# Patient Record
Sex: FEMALE | Race: WHITE | NOT HISPANIC OR LATINO | Employment: UNEMPLOYED | URBAN - METROPOLITAN AREA
[De-identification: names, ages, dates, MRNs, and addresses within clinical notes are randomized per-mention and may not be internally consistent; named-entity substitution may affect disease eponyms.]

---

## 2017-01-23 ENCOUNTER — HOSPITAL ENCOUNTER (OUTPATIENT)
Dept: RADIOLOGY | Facility: MEDICAL CENTER | Age: 52
End: 2017-01-23
Attending: FAMILY MEDICINE
Payer: COMMERCIAL

## 2017-01-23 ENCOUNTER — OFFICE VISIT (OUTPATIENT)
Dept: URGENT CARE | Facility: PHYSICIAN GROUP | Age: 52
End: 2017-01-23
Payer: COMMERCIAL

## 2017-01-23 VITALS
HEIGHT: 68 IN | RESPIRATION RATE: 16 BRPM | BODY MASS INDEX: 25.01 KG/M2 | TEMPERATURE: 97.8 F | OXYGEN SATURATION: 98 % | DIASTOLIC BLOOD PRESSURE: 78 MMHG | WEIGHT: 165 LBS | SYSTOLIC BLOOD PRESSURE: 138 MMHG | HEART RATE: 88 BPM
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
## 2017-01-23 PROCEDURE — 99202 OFFICE O/P NEW SF 15 MIN: CPT | Mod: 25 | Performed by: FAMILY MEDICINE

## 2017-01-23 PROCEDURE — 73140 X-RAY EXAM OF FINGER(S): CPT | Mod: LT

## 2017-01-23 PROCEDURE — 90471 IMMUNIZATION ADMIN: CPT | Performed by: FAMILY MEDICINE

## 2017-01-23 PROCEDURE — 90715 TDAP VACCINE 7 YRS/> IM: CPT | Performed by: FAMILY MEDICINE

## 2017-01-23 RX ORDER — IBUPROFEN 800 MG/1
800 TABLET ORAL EVERY 8 HOURS PRN
Qty: 20 TAB | Refills: 3 | Status: SHIPPED | OUTPATIENT
Start: 2017-01-23 | End: 2017-06-21

## 2017-01-23 RX ORDER — AMOXICILLIN AND CLAVULANATE POTASSIUM 875; 125 MG/1; MG/1
1 TABLET, FILM COATED ORAL 2 TIMES DAILY
Qty: 14 TAB | Refills: 0 | Status: SHIPPED | OUTPATIENT
Start: 2017-01-23 | End: 2017-01-30

## 2017-01-23 ASSESSMENT — ENCOUNTER SYMPTOMS
CHILLS: 0
FEVER: 0
SENSORY CHANGE: 1
FOCAL WEAKNESS: 0

## 2017-01-23 NOTE — MR AVS SNAPSHOT
"        Zari Day   2017 11:45 AM   Office Visit   MRN: [id number]    Department:  [facility]   Dept Phone:  [phone]    Description:  Female : 1965   Provider:  Kory Medina M.D.           Reason for Visit     Dog Bite right hand index and middle finger bites, stray dog unknown on shots       Allergies as of 2017     No Known Allergies      You were diagnosed with     Skin laceration   [085235]       Dog bite, initial encounter   [940001]         Vital Signs     Blood Pressure Pulse Temperature Respirations Height Weight    138/78 mmHg 88 36.6 °C (97.8 °F) 16 1.727 m (5' 8\") 74.844 kg (165 lb)    Body Mass Index Oxygen Saturation Smoking Status             25.09 kg/m2 98% Unknown If Ever Smoked         Basic Information     Date Of Birth Sex Race Ethnicity Preferred Language    1965 Female White Non- English      Your appointments     Mar 09, 2017 11:00 AM   New Patient with Dai Chairez M.D.   [facility] [address], [address]  [facility] [address]   [phone]              Health Maintenance        Date Due Completion Dates    IMM DTaP/Tdap/Td Vaccine (1 - Tdap) 1986 ---    PAP SMEAR 1988 ---    MAMMOGRAM 2007 ---    IMM INFLUENZA (1) 2016 ---            Current Immunizations     Tdap Vaccine 2017      Below and/or attached are the medications your provider expects you to take. Review all of your home medications and newly ordered medications with your provider and/or pharmacist. Follow medication instructions as directed by your provider and/or pharmacist. Please keep your medication list with you and share with your provider. Update the information when medications are discontinued, doses are changed, or new medications (including over-the-counter products) are added; and carry medication information at all times in the event of emergency situations     Allergies:  No Known Allergies       "   Medications  Valid as of: January 23, 2017 -  1:10 PM    Generic Name Brand Name Tablet Size Instructions for use    Amoxicillin-Pot Clavulanate (Tab) AUGMENTIN 875-125 MG Take 1 Tab by mouth 2 times a day for 7 days.        Ibuprofen (Tab) MOTRIN 800 MG Take 1 Tab by mouth every 8 hours as needed.        .                 Medicines prescribed today were sent to:     [id number] - [place], [place] - [address] [place] [address]    Phone: [phone] Fax: [phone]    Open 24 Hours?: No      Medication refill instructions:       If your prescription bottle indicates you have medication refills left, it is not necessary to call your provider’s office. Please contact your pharmacy and they will refill your medication.    If your prescription bottle indicates you do not have any refills left, you may request refills at any time through one of the following ways: The online [employer] system (except Urgent Care), by calling your provider’s office, or by asking your pharmacy to contact your provider’s office with a refill request. Medication refills are processed only during regular business hours and may not be available until the next business day. Your provider may request additional information or to have a follow-up visit with you prior to refilling your medication.   *Please Note: Medication refills are assigned a new Rx number when refilled electronically. Your pharmacy may indicate that no refills were authorized even though a new prescription for the same medication is available at the pharmacy. Please request the medicine by name with the pharmacy before contacting your provider for a refill.        Your To Do List     Future Labs/Procedures Complete By Expires    DX-FINGER(S) 2+ LEFT  As directed 1/23/2018         [employer] Access Code: [email or web address]  Expires: 2/10/2017  3:49 PM    [employer]  A secure, online tool to manage your health information     [email or web address]’s  FreeBorders® is a secure, online tool that connects you to your personalized health information from the privacy of your home -- day or night - making it very easy for you to manage your healthcare. Once the activation process is completed, you can even access your medical information using the FreeBorders norris, which is available for free in the Apple Norris store or Google Play store.     FreeBorders provides the following levels of access (as shown below):   My Chart Features   [facility] Doctor [facility]  Specialists [facility]  Urgent  Care Non-[facility]  Primary Care  Doctor   Email your healthcare team securely and privately 24/7 X X X    Manage appointments: schedule your next appointment; view details of past/upcoming appointments X      Request prescription refills. X      View recent personal medical records, including lab and immunizations X X X X   View health record, including health history, allergies, medications X X X X   Read reports about your outpatient visits, procedures, consult and ER notes X X X X   See your discharge summary, which is a recap of your hospital and/or ER visit that includes your diagnosis, lab results, and care plan. X X       How to register for FreeBorders:  1. Go to  [email or web address].  2. Click on the Sign Up Now box, which takes you to the New Member Sign Up page. You will need to provide the following information:  a. Enter your FreeBorders Access Code exactly as it appears at the top of this page. (You will not need to use this code after you’ve completed the sign-up process. If you do not sign up before the expiration date, you must request a new code.)   b. Enter your date of birth.   c. Enter your home email address.   d. Click Submit, and follow the next screen’s instructions.  3. Create a FreeBorders ID. This will be your FreeBorders login ID and cannot be changed, so think of one that is secure and easy to remember.  4. Create a FreeBorders password. You can change your password at any  time.  5. Enter your Password Reset Question and Answer. This can be used at a later time if you forget your password.   6. Enter your e-mail address. This allows you to receive e-mail notifications when new information is available in Mobile [employer]br[employer].  7. Click Sign Up. You can now view your health information.    For assistance activating your [employer] account, call [phone]

## 2017-01-23 NOTE — PROGRESS NOTES
"Subjective:      Zari Day is a 51 y.o. female who presents with Dog Bite    Chief Complaint   Patient presents with   • Dog Bite     right hand index and middle finger bites, stray dog unknown on shots        - saw what looked to be a lost dog on side road 2 days ago. She went to rescue dog and as she was trying to pull/lift the dog by collar into her car it snapped at her biting Lt index/middle fingers. The dog then ran off.     Last td about 10yrs ago                HPI    Review of Systems   Constitutional: Negative for fever and chills.   Neurological: Positive for sensory change. Negative for focal weakness.          Objective:     /78 mmHg  Pulse 88  Temp(Src) 36.6 °C (97.8 °F)  Resp 16  Ht 1.727 m (5' 8\")  Wt 74.844 kg (165 lb)  BMI 25.09 kg/m2  SpO2 98%     Physical Exam   Constitutional: She appears well-developed. No distress.   HENT:   Head: Normocephalic and atraumatic.   Cardiovascular: Regular rhythm.    No murmur heard.  Neurological: She is alert.   Skin: Skin is warm and dry.   Psychiatric: She has a normal mood and affect. Judgment normal.   Nursing note and vitals reviewed.  Lt hand: some superficial PW's over distal index and middle fingers. Trace edema w/ some difficulty w/ full rom due to swelling pain but otherwise NVI. No signs infection              Assessment/Plan:         1. Skin laceration  DX-FINGER(S) 2+ LEFT    Tdap =>6yo IM   2. Dog bite, initial encounter  amoxicillin-clavulanate (AUGMENTIN) 875-125 MG Tab    ibuprofen (MOTRIN) 800 MG Tab    DX-FINGER(S) 2+ LEFT    Tdap =>8yo IM             Dx & d/c instructions discussed w/ patient and/or family members. Follow up w/ Prvt Dr or here in 3-4 days if not getting better, sooner if needed,  ER if worse and UC/PCP unavailable.        Possible side effects (i.e. Rash, GI upset/constipation, sedation, elevation of BP or sugars) of any medications given discussed.                    "

## 2017-05-10 ENCOUNTER — GYNECOLOGY VISIT (OUTPATIENT)
Dept: OBGYN | Facility: CLINIC | Age: 52
End: 2017-05-10
Payer: COMMERCIAL

## 2017-05-10 ENCOUNTER — HOSPITAL ENCOUNTER (OUTPATIENT)
Facility: MEDICAL CENTER | Age: 52
End: 2017-05-10
Attending: OBSTETRICS & GYNECOLOGY
Payer: COMMERCIAL

## 2017-05-10 VITALS
BODY MASS INDEX: 25.31 KG/M2 | SYSTOLIC BLOOD PRESSURE: 102 MMHG | HEIGHT: 68 IN | DIASTOLIC BLOOD PRESSURE: 62 MMHG | WEIGHT: 167 LBS

## 2017-05-10 DIAGNOSIS — Z01.419 WELL WOMAN EXAM: ICD-10-CM

## 2017-05-10 DIAGNOSIS — Z12.31 ENCOUNTER FOR SCREENING MAMMOGRAM FOR BREAST CANCER: ICD-10-CM

## 2017-05-10 DIAGNOSIS — Z11.51 SCREENING FOR HPV (HUMAN PAPILLOMAVIRUS): ICD-10-CM

## 2017-05-10 DIAGNOSIS — Z12.4 ROUTINE CERVICAL SMEAR: ICD-10-CM

## 2017-05-10 PROCEDURE — 99386 PREV VISIT NEW AGE 40-64: CPT | Performed by: OBSTETRICS & GYNECOLOGY

## 2017-05-10 PROCEDURE — 88175 CYTOPATH C/V AUTO FLUID REDO: CPT

## 2017-05-10 PROCEDURE — 87624 HPV HI-RISK TYP POOLED RSLT: CPT

## 2017-05-10 NOTE — MR AVS SNAPSHOT
"        Zari Day   5/10/2017 1:30 PM   Gynecology Visit   MRN: [id number]    Department:  [facility]   Dept Phone:  [phone]    Description:  Female : 1965   Provider:  Dai Chairez M.D.           Reason for Visit     Gynecologic Exam           Allergies as of 5/10/2017     No Known Allergies      You were diagnosed with     Well woman exam   [037167]       Routine cervical smear   [367172]       Screening for HPV (human papillomavirus)   [370411]       Encounter for screening mammogram for breast cancer   [0383505]         Vital Signs     Blood Pressure Height Weight Body Mass Index Last Menstrual Period Smoking Status    102/62 mmHg 1.727 m (5' 8\") 75.751 kg (167 lb) 25.40 kg/m2 2017 Unknown If Ever Smoked      Basic Information     Date Of Birth Sex Race Ethnicity Preferred Language    1965 Female White Non- English      Health Maintenance        Date Due Completion Dates    PAP SMEAR 1988 ---    MAMMOGRAM 2007 ---    IMM DTaP/Tdap/Td Vaccine (2 - Td) 2027            Current Immunizations     Tdap Vaccine 2017      Below and/or attached are the medications your provider expects you to take. Review all of your home medications and newly ordered medications with your provider and/or pharmacist. Follow medication instructions as directed by your provider and/or pharmacist. Please keep your medication list with you and share with your provider. Update the information when medications are discontinued, doses are changed, or new medications (including over-the-counter products) are added; and carry medication information at all times in the event of emergency situations     Allergies:  No Known Allergies          Medications  Valid as of: May 10, 2017 -  2:49 PM    Generic Name Brand Name Tablet Size Instructions for use    Ibuprofen (Tab) MOTRIN 800 MG Take 1 Tab by mouth every 8 hours as needed.        .                 Medicines " prescribed today were sent to:     [id number] - [place], [place] - [id number] [facility] [place] [address]    Phone: [phone] Fax: [phone]    Open 24 Hours?: No      Medication refill instructions:       If your prescription bottle indicates you have medication refills left, it is not necessary to call your provider’s office. Please contact your pharmacy and they will refill your medication.    If your prescription bottle indicates you do not have any refills left, you may request refills at any time through one of the following ways: The online [employer] system (except Urgent Care), by calling your provider’s office, or by asking your pharmacy to contact your provider’s office with a refill request. Medication refills are processed only during regular business hours and may not be available until the next business day. Your provider may request additional information or to have a follow-up visit with you prior to refilling your medication.   *Please Note: Medication refills are assigned a new Rx number when refilled electronically. Your pharmacy may indicate that no refills were authorized even though a new prescription for the same medication is available at the pharmacy. Please request the medicine by name with the pharmacy before contacting your provider for a refill.        Your To Do List     Future Labs/Procedures Complete By Expires    MA-SCREEN MAMMO W/CAD-BILAT  As directed 5/10/2018    THINPREP PAP WITH HPV  As directed 5/10/2018         [employer] Access Code: [email or web address]  Expires: 6/8/2017 12:46 PM    [employer]  A secure, online tool to manage your health information     [employer]’s WizeHive® is a secure, online tool that connects you to your personalized health information from the privacy of your home -- day or night - making it very easy for you to manage your healthcare. Once the activation process is completed, you can even access your medical information  using the [employer] norris, which is available for free in the Apple Norris store or Google Play store.     [employer] provides the following levels of access (as shown below):   My Chart Features   [facility] Doctor [facility]  Specialists [facility]  Urgent  Care Non-[facility]  Primary Care  Doctor   Email your healthcare team securely and privately 24/7 X X X    Manage appointments: schedule your next appointment; view details of past/upcoming appointments X      Request prescription refills. X      View recent personal medical records, including lab and immunizations X X X X   View health record, including health history, allergies, medications X X X X   Read reports about your outpatient visits, procedures, consult and ER notes X X X X   See your discharge summary, which is a recap of your hospital and/or ER visit that includes your diagnosis, lab results, and care plan. X X       How to register for [employer]:  1. Go to  [email or web address].  2. Click on the Sign Up Now box, which takes you to the New Member Sign Up page. You will need to provide the following information:  a. Enter your [employer] Access Code exactly as it appears at the top of this page. (You will not need to use this code after you’ve completed the sign-up process. If you do not sign up before the expiration date, you must request a new code.)   b. Enter your date of birth.   c. Enter your home email address.   d. Click Submit, and follow the next screen’s instructions.  3. Create a [employer] ID. This will be your [employer] login ID and cannot be changed, so think of one that is secure and easy to remember.  4. Create a [employer] password. You can change your password at any time.  5. Enter your Password Reset Question and Answer. This can be used at a later time if you forget your password.   6. Enter your e-mail address. This allows you to receive e-mail notifications when new information is available in [employer].  7. Click Sign Up. You can now view your  health information.    For assistance activating your [employer] account, call [phone]

## 2017-05-10 NOTE — PROGRESS NOTES
" Zari Day51 y.o.  female presents for Annual Well Woman Exam.   Patient is currently without complaints. She reports irregular menstrual cycles . She reports that sometimes she skips them for a few months and then has a 3-4 day cycle which is very very light. She denies any excessive pain, no breakthrough bleeding. She does complain of hot flashes mood swings and occasional night sweats.      ROS: Patient is feeling well. No dyspnea or chest pain on exertion. No Abdominal pain, change in bowel habits, black or bloody stools. No urinary sx. GYN ROS:no breast pain or new or enlarging lumps on self exam. Denies breast tenderness, mass, discharge, changes in size or contour, or abnormal cyclic discomfort. No neurological complaints.  History reviewed. No pertinent past medical history.  None  Allergy:   Review of patient's allergies indicates no known allergies.    LMP:       Patient's last menstrual period was 2017. .     Menstrual History: menses irregular: Every few months very light not painfulnone  Contraceptive Method:none    Pap history, the patient denies abnormal Pap smears and denies   sexually transmitted diseases    Mammo:needs    Objective : The patient appears well, alert and oriented x 3, in no acute distress.  Blood pressure 102/62, height 1.727 m (5' 8\"), weight 75.751 kg (167 lb), last menstrual period 2017.  HEENT Exam: EOMI, DEANGELO, no adenopathy or thyromegaly.  Lungs: Clear to Auscultation   Cor: S1 and S2 normal, no murmurs, or rubs   Abdomen: Soft without tenderness, guarding mass or organomegaly.  Extremities: No edema, pulses equal  Neurological: Normal No focal signs  Breast Exam:Inspection negative. No nipple discharge or bleeding. No masses or nodularity palpable  Pelvic: External genitalia,urethral meatus, urethra, bladder and vagina normal. Cervix, uterus and adnexa intact and normal.  Anus and perineum normal. Bimanual and rectovaginal without masses or " tenderness., negative findings: external genitalia normal, Bartholin's glands, urethra, [place]'s glands negative, vaginal mucosa normal, cervix clear, normal sized uterus, adnexae negative    Lab:No results found for this or any previous visit (from the past 336 hour(s)).    Assessment:  well woman    Plan:mammogram  pap smear  return annually or prn  Self Breast Exams    Discussed medications for symptomatically relief of hot flashes at this point patient does not desire treatment

## 2017-05-11 LAB
CYTOLOGY REG CYTOL: NORMAL
HPV HR 12 DNA CVX QL NAA+PROBE: NEGATIVE
HPV16 DNA SPEC QL NAA+PROBE: NEGATIVE
HPV18 DNA SPEC QL NAA+PROBE: NEGATIVE
SPECIMEN SOURCE: NORMAL

## 2017-06-21 ENCOUNTER — RESOLUTE PROFESSIONAL BILLING HOSPITAL PROF FEE (OUTPATIENT)
Dept: HOSPITALIST | Facility: MEDICAL CENTER | Age: 52
End: 2017-06-21
Payer: COMMERCIAL

## 2017-06-21 ENCOUNTER — APPOINTMENT (OUTPATIENT)
Dept: RADIOLOGY | Facility: MEDICAL CENTER | Age: 52
DRG: 897 | End: 2017-06-21
Attending: HOSPITALIST
Payer: COMMERCIAL

## 2017-06-21 ENCOUNTER — HOSPITAL ENCOUNTER (INPATIENT)
Facility: MEDICAL CENTER | Age: 52
LOS: 1 days | DRG: 897 | End: 2017-06-22
Attending: EMERGENCY MEDICINE | Admitting: HOSPITALIST
Payer: COMMERCIAL

## 2017-06-21 DIAGNOSIS — D69.6 THROMBOCYTOPENIA (HCC): ICD-10-CM

## 2017-06-21 DIAGNOSIS — F10.939 ALCOHOL WITHDRAWAL, WITH UNSPECIFIED COMPLICATION (HCC): ICD-10-CM

## 2017-06-21 DIAGNOSIS — K70.10 ALCOHOLIC HEPATITIS WITHOUT ASCITES: ICD-10-CM

## 2017-06-21 DIAGNOSIS — R56.9: ICD-10-CM

## 2017-06-21 DIAGNOSIS — D72.819 LEUKOPENIA, UNSPECIFIED TYPE: ICD-10-CM

## 2017-06-21 DIAGNOSIS — F10.939: ICD-10-CM

## 2017-06-21 LAB
ALBUMIN SERPL BCP-MCNC: 4.8 G/DL (ref 3.2–4.9)
ALBUMIN/GLOB SERPL: 1.9 G/DL
ALP SERPL-CCNC: 157 U/L (ref 30–99)
ALT SERPL-CCNC: 64 U/L (ref 2–50)
ANION GAP SERPL CALC-SCNC: 13 MMOL/L (ref 0–11.9)
AST SERPL-CCNC: 128 U/L (ref 12–45)
BASOPHILS # BLD AUTO: 1.6 % (ref 0–1.8)
BASOPHILS # BLD: 0.03 K/UL (ref 0–0.12)
BILIRUB SERPL-MCNC: 4.1 MG/DL (ref 0.1–1.5)
BUN SERPL-MCNC: 12 MG/DL (ref 8–22)
CALCIUM SERPL-MCNC: 10.3 MG/DL (ref 8.5–10.5)
CHLORIDE SERPL-SCNC: 102 MMOL/L (ref 96–112)
CO2 SERPL-SCNC: 23 MMOL/L (ref 20–33)
COMMENT 1642: NORMAL
CREAT SERPL-MCNC: 0.69 MG/DL (ref 0.5–1.4)
EOSINOPHIL # BLD AUTO: 0.01 K/UL (ref 0–0.51)
EOSINOPHIL NFR BLD: 0.5 % (ref 0–6.9)
ERYTHROCYTE [DISTWIDTH] IN BLOOD BY AUTOMATED COUNT: 50.2 FL (ref 35.9–50)
GFR SERPL CREATININE-BSD FRML MDRD: >60 ML/MIN/1.73 M 2
GLOBULIN SER CALC-MCNC: 2.5 G/DL (ref 1.9–3.5)
GLUCOSE SERPL-MCNC: 165 MG/DL (ref 65–99)
HCT VFR BLD AUTO: 37.1 % (ref 37–47)
HGB BLD-MCNC: 13.1 G/DL (ref 12–16)
IMM GRANULOCYTES # BLD AUTO: 0 K/UL (ref 0–0.11)
IMM GRANULOCYTES NFR BLD AUTO: 0 % (ref 0–0.9)
LYMPHOCYTES # BLD AUTO: 0.29 K/UL (ref 1–4.8)
LYMPHOCYTES NFR BLD: 15.3 % (ref 22–41)
MCH RBC QN AUTO: 34.7 PG (ref 27–33)
MCHC RBC AUTO-ENTMCNC: 35.3 G/DL (ref 33.6–35)
MCV RBC AUTO: 98.4 FL (ref 81.4–97.8)
MONOCYTES # BLD AUTO: 0.3 K/UL (ref 0–0.85)
MONOCYTES NFR BLD AUTO: 15.8 % (ref 0–13.4)
MORPHOLOGY BLD-IMP: NORMAL
NEUTROPHILS # BLD AUTO: 1.27 K/UL (ref 2–7.15)
NEUTROPHILS NFR BLD: 66.8 % (ref 44–72)
NRBC # BLD AUTO: 0 K/UL
NRBC BLD AUTO-RTO: 0 /100 WBC
PLATELET # BLD AUTO: 46 K/UL (ref 164–446)
PLATELETS.RETICULATED NFR BLD AUTO: 8 K/UL (ref 0.6–13.1)
PMV BLD AUTO: 9.8 FL (ref 9–12.9)
POTASSIUM SERPL-SCNC: 3.4 MMOL/L (ref 3.6–5.5)
PROT SERPL-MCNC: 7.3 G/DL (ref 6–8.2)
RBC # BLD AUTO: 3.77 M/UL (ref 4.2–5.4)
SODIUM SERPL-SCNC: 138 MMOL/L (ref 135–145)
WBC # BLD AUTO: 1.9 K/UL (ref 4.8–10.8)

## 2017-06-21 PROCEDURE — 700101 HCHG RX REV CODE 250: Performed by: EMERGENCY MEDICINE

## 2017-06-21 PROCEDURE — 85055 RETICULATED PLATELET ASSAY: CPT

## 2017-06-21 PROCEDURE — 700105 HCHG RX REV CODE 258: Performed by: HOSPITALIST

## 2017-06-21 PROCEDURE — 96375 TX/PRO/DX INJ NEW DRUG ADDON: CPT

## 2017-06-21 PROCEDURE — 96365 THER/PROPH/DIAG IV INF INIT: CPT

## 2017-06-21 PROCEDURE — 770020 HCHG ROOM/CARE - TELE (206)

## 2017-06-21 PROCEDURE — 99285 EMERGENCY DEPT VISIT HI MDM: CPT

## 2017-06-21 PROCEDURE — 99223 1ST HOSP IP/OBS HIGH 75: CPT | Performed by: HOSPITALIST

## 2017-06-21 PROCEDURE — 700102 HCHG RX REV CODE 250 W/ 637 OVERRIDE(OP): Performed by: HOSPITALIST

## 2017-06-21 PROCEDURE — 700111 HCHG RX REV CODE 636 W/ 250 OVERRIDE (IP): Performed by: EMERGENCY MEDICINE

## 2017-06-21 PROCEDURE — 85025 COMPLETE CBC W/AUTO DIFF WBC: CPT

## 2017-06-21 PROCEDURE — A9270 NON-COVERED ITEM OR SERVICE: HCPCS | Performed by: HOSPITALIST

## 2017-06-21 PROCEDURE — 95951 EEG: CPT

## 2017-06-21 PROCEDURE — HZ2ZZZZ DETOXIFICATION SERVICES FOR SUBSTANCE ABUSE TREATMENT: ICD-10-PCS | Performed by: HOSPITALIST

## 2017-06-21 PROCEDURE — 80053 COMPREHEN METABOLIC PANEL: CPT

## 2017-06-21 PROCEDURE — 36415 COLL VENOUS BLD VENIPUNCTURE: CPT

## 2017-06-21 PROCEDURE — 700111 HCHG RX REV CODE 636 W/ 250 OVERRIDE (IP): Performed by: HOSPITALIST

## 2017-06-21 RX ORDER — HEPARIN SODIUM 5000 [USP'U]/ML
5000 INJECTION, SOLUTION INTRAVENOUS; SUBCUTANEOUS EVERY 8 HOURS
Status: DISCONTINUED | OUTPATIENT
Start: 2017-06-21 | End: 2017-06-22 | Stop reason: HOSPADM

## 2017-06-21 RX ORDER — POTASSIUM CHLORIDE 20 MEQ/1
40 TABLET, EXTENDED RELEASE ORAL 2 TIMES DAILY
Status: COMPLETED | OUTPATIENT
Start: 2017-06-21 | End: 2017-06-21

## 2017-06-21 RX ORDER — SODIUM CHLORIDE 9 MG/ML
INJECTION, SOLUTION INTRAVENOUS CONTINUOUS
Status: DISCONTINUED | OUTPATIENT
Start: 2017-06-21 | End: 2017-06-22 | Stop reason: HOSPADM

## 2017-06-21 RX ORDER — PROMETHAZINE HYDROCHLORIDE 25 MG/1
12.5-25 SUPPOSITORY RECTAL EVERY 4 HOURS PRN
Status: DISCONTINUED | OUTPATIENT
Start: 2017-06-21 | End: 2017-06-22 | Stop reason: HOSPADM

## 2017-06-21 RX ORDER — POLYETHYLENE GLYCOL 3350 17 G/17G
1 POWDER, FOR SOLUTION ORAL
Status: DISCONTINUED | OUTPATIENT
Start: 2017-06-21 | End: 2017-06-22 | Stop reason: HOSPADM

## 2017-06-21 RX ORDER — FOLIC ACID 1 MG/1
1 TABLET ORAL DAILY
Status: DISCONTINUED | OUTPATIENT
Start: 2017-06-21 | End: 2017-06-22 | Stop reason: HOSPADM

## 2017-06-21 RX ORDER — LORAZEPAM 2 MG/ML
2 INJECTION INTRAMUSCULAR ONCE
Status: COMPLETED | OUTPATIENT
Start: 2017-06-21 | End: 2017-06-21

## 2017-06-21 RX ORDER — LORAZEPAM 2 MG/ML
2 INJECTION INTRAMUSCULAR
Status: COMPLETED | OUTPATIENT
Start: 2017-06-21 | End: 2017-06-22

## 2017-06-21 RX ORDER — PROMETHAZINE HYDROCHLORIDE 25 MG/1
12.5-25 TABLET ORAL EVERY 4 HOURS PRN
Status: DISCONTINUED | OUTPATIENT
Start: 2017-06-21 | End: 2017-06-22 | Stop reason: HOSPADM

## 2017-06-21 RX ORDER — CHLORDIAZEPOXIDE HYDROCHLORIDE 5 MG/1
10 CAPSULE, GELATIN COATED ORAL EVERY 8 HOURS
Status: DISCONTINUED | OUTPATIENT
Start: 2017-06-21 | End: 2017-06-22 | Stop reason: HOSPADM

## 2017-06-21 RX ORDER — LORAZEPAM 1 MG/1
4 TABLET ORAL
Status: DISCONTINUED | OUTPATIENT
Start: 2017-06-21 | End: 2017-06-22 | Stop reason: HOSPADM

## 2017-06-21 RX ORDER — ONDANSETRON 2 MG/ML
4 INJECTION INTRAMUSCULAR; INTRAVENOUS EVERY 4 HOURS PRN
Status: DISCONTINUED | OUTPATIENT
Start: 2017-06-21 | End: 2017-06-22 | Stop reason: HOSPADM

## 2017-06-21 RX ORDER — BISACODYL 10 MG
10 SUPPOSITORY, RECTAL RECTAL
Status: DISCONTINUED | OUTPATIENT
Start: 2017-06-21 | End: 2017-06-22 | Stop reason: HOSPADM

## 2017-06-21 RX ORDER — LORAZEPAM 1 MG/1
3 TABLET ORAL
Status: DISCONTINUED | OUTPATIENT
Start: 2017-06-21 | End: 2017-06-22 | Stop reason: HOSPADM

## 2017-06-21 RX ORDER — LABETALOL HYDROCHLORIDE 5 MG/ML
10 INJECTION, SOLUTION INTRAVENOUS EVERY 4 HOURS PRN
Status: DISCONTINUED | OUTPATIENT
Start: 2017-06-21 | End: 2017-06-22 | Stop reason: HOSPADM

## 2017-06-21 RX ORDER — LORAZEPAM 2 MG/ML
1-2 INJECTION INTRAMUSCULAR
Status: DISCONTINUED | OUTPATIENT
Start: 2017-06-21 | End: 2017-06-22 | Stop reason: HOSPADM

## 2017-06-21 RX ORDER — LORAZEPAM 1 MG/1
1 TABLET ORAL EVERY 4 HOURS PRN
Status: DISCONTINUED | OUTPATIENT
Start: 2017-06-21 | End: 2017-06-22 | Stop reason: HOSPADM

## 2017-06-21 RX ORDER — ACETAMINOPHEN 325 MG/1
650 TABLET ORAL EVERY 6 HOURS PRN
Status: DISCONTINUED | OUTPATIENT
Start: 2017-06-21 | End: 2017-06-22 | Stop reason: HOSPADM

## 2017-06-21 RX ORDER — LORAZEPAM 1 MG/1
0.5 TABLET ORAL EVERY 4 HOURS PRN
Status: DISCONTINUED | OUTPATIENT
Start: 2017-06-21 | End: 2017-06-22 | Stop reason: HOSPADM

## 2017-06-21 RX ORDER — CLONIDINE HYDROCHLORIDE 0.1 MG/1
0.1 TABLET ORAL EVERY 6 HOURS PRN
Status: DISCONTINUED | OUTPATIENT
Start: 2017-06-21 | End: 2017-06-22 | Stop reason: HOSPADM

## 2017-06-21 RX ORDER — CYCLOBENZAPRINE HCL 10 MG
10 TABLET ORAL 3 TIMES DAILY PRN
COMMUNITY
End: 2018-07-25

## 2017-06-21 RX ORDER — LORAZEPAM 1 MG/1
2 TABLET ORAL
Status: DISCONTINUED | OUTPATIENT
Start: 2017-06-21 | End: 2017-06-22 | Stop reason: HOSPADM

## 2017-06-21 RX ORDER — THIAMINE MONONITRATE (VIT B1) 100 MG
100 TABLET ORAL DAILY
Status: DISCONTINUED | OUTPATIENT
Start: 2017-06-21 | End: 2017-06-22 | Stop reason: HOSPADM

## 2017-06-21 RX ORDER — LORAZEPAM 2 MG/ML
1 INJECTION INTRAMUSCULAR
Status: DISCONTINUED | OUTPATIENT
Start: 2017-06-21 | End: 2017-06-22 | Stop reason: HOSPADM

## 2017-06-21 RX ORDER — LORAZEPAM 2 MG/ML
1.5 INJECTION INTRAMUSCULAR
Status: DISCONTINUED | OUTPATIENT
Start: 2017-06-21 | End: 2017-06-22 | Stop reason: HOSPADM

## 2017-06-21 RX ORDER — ONDANSETRON 4 MG/1
4 TABLET, ORALLY DISINTEGRATING ORAL EVERY 4 HOURS PRN
Status: DISCONTINUED | OUTPATIENT
Start: 2017-06-21 | End: 2017-06-22 | Stop reason: HOSPADM

## 2017-06-21 RX ORDER — LORAZEPAM 2 MG/ML
2 INJECTION INTRAMUSCULAR
Status: DISCONTINUED | OUTPATIENT
Start: 2017-06-21 | End: 2017-06-22 | Stop reason: HOSPADM

## 2017-06-21 RX ORDER — AMOXICILLIN 250 MG
2 CAPSULE ORAL 2 TIMES DAILY
Status: DISCONTINUED | OUTPATIENT
Start: 2017-06-21 | End: 2017-06-22 | Stop reason: HOSPADM

## 2017-06-21 RX ORDER — LORAZEPAM 2 MG/ML
0.5 INJECTION INTRAMUSCULAR EVERY 4 HOURS PRN
Status: DISCONTINUED | OUTPATIENT
Start: 2017-06-21 | End: 2017-06-22 | Stop reason: HOSPADM

## 2017-06-21 RX ADMIN — Medication 100 MG: at 11:25

## 2017-06-21 RX ADMIN — CHLORDIAZEPOXIDE HYDROCHLORIDE 10 MG: 5 CAPSULE ORAL at 11:23

## 2017-06-21 RX ADMIN — HEPARIN SODIUM 5000 UNITS: 5000 INJECTION, SOLUTION INTRAVENOUS; SUBCUTANEOUS at 11:22

## 2017-06-21 RX ADMIN — HEPARIN SODIUM 5000 UNITS: 5000 INJECTION, SOLUTION INTRAVENOUS; SUBCUTANEOUS at 15:05

## 2017-06-21 RX ADMIN — FOLIC ACID 1 MG: 1 TABLET ORAL at 11:25

## 2017-06-21 RX ADMIN — SODIUM CHLORIDE: 9 INJECTION, SOLUTION INTRAVENOUS at 20:28

## 2017-06-21 RX ADMIN — LORAZEPAM 2 MG: 2 INJECTION INTRAMUSCULAR; INTRAVENOUS at 06:05

## 2017-06-21 RX ADMIN — POTASSIUM CHLORIDE 40 MEQ: 1500 TABLET, EXTENDED RELEASE ORAL at 20:19

## 2017-06-21 RX ADMIN — SODIUM CHLORIDE: 9 INJECTION, SOLUTION INTRAVENOUS at 11:20

## 2017-06-21 RX ADMIN — CHLORDIAZEPOXIDE HYDROCHLORIDE 10 MG: 5 CAPSULE ORAL at 20:19

## 2017-06-21 RX ADMIN — LORAZEPAM 1 MG: 1 TABLET ORAL at 17:24

## 2017-06-21 RX ADMIN — CHLORDIAZEPOXIDE HYDROCHLORIDE 10 MG: 5 CAPSULE ORAL at 15:07

## 2017-06-21 RX ADMIN — HEPARIN SODIUM 5000 UNITS: 5000 INJECTION, SOLUTION INTRAVENOUS; SUBCUTANEOUS at 20:19

## 2017-06-21 RX ADMIN — POTASSIUM CHLORIDE 40 MEQ: 1500 TABLET, EXTENDED RELEASE ORAL at 11:24

## 2017-06-21 RX ADMIN — THIAMINE HYDROCHLORIDE 1000 ML: 100 INJECTION, SOLUTION INTRAMUSCULAR; INTRAVENOUS at 06:05

## 2017-06-21 ASSESSMENT — LIFESTYLE VARIABLES
HAVE YOU EVER FELT YOU SHOULD CUT DOWN ON YOUR DRINKING: YES
HEADACHE, FULLNESS IN HEAD: NOT PRESENT
TREMOR: TREMOR NOT VISIBLE BUT CAN BE FELT, FINGERTIP TO FINGERTIP
AVERAGE NUMBER OF DAYS PER WEEK YOU HAVE A DRINK CONTAINING ALCOHOL: 3
ANXIETY: MILDLY ANXIOUS
TREMOR: TREMOR NOT VISIBLE BUT CAN BE FELT, FINGERTIP TO FINGERTIP
TREMOR: *
TOTAL SCORE: 8
EVER FELT BAD OR GUILTY ABOUT YOUR DRINKING: YES
AGITATION: SOMEWHAT MORE THAN NORMAL ACTIVITY
ALCOHOL_USE: YES
ORIENTATION AND CLOUDING OF SENSORIUM: ORIENTED AND CAN DO SERIAL ADDITIONS
TOTAL SCORE: 3
NAUSEA AND VOMITING: NO NAUSEA AND NO VOMITING
DOES PATIENT WANT TO STOP DRINKING: YES
TOTAL SCORE: 4
ANXIETY: MILDLY ANXIOUS
ORIENTATION AND CLOUDING OF SENSORIUM: ORIENTED AND CAN DO SERIAL ADDITIONS
ON A TYPICAL DAY WHEN YOU DRINK ALCOHOL HOW MANY DRINKS DO YOU HAVE: 10
NAUSEA AND VOMITING: NO NAUSEA AND NO VOMITING
NAUSEA AND VOMITING: NO NAUSEA AND NO VOMITING
ORIENTATION AND CLOUDING OF SENSORIUM: ORIENTED AND CAN DO SERIAL ADDITIONS
PAROXYSMAL SWEATS: NO SWEAT VISIBLE
AUDITORY DISTURBANCES: NOT PRESENT
VISUAL DISTURBANCES: NOT PRESENT
HEADACHE, FULLNESS IN HEAD: NOT PRESENT
EVER_SMOKED: YES
AGITATION: *
HEADACHE, FULLNESS IN HEAD: NOT PRESENT
TOTAL SCORE: 4
NAUSEA AND VOMITING: NO NAUSEA AND NO VOMITING
EVER HAD A DRINK FIRST THING IN THE MORNING TO STEADY YOUR NERVES TO GET RID OF A HANGOVER: YES
HAVE PEOPLE ANNOYED YOU BY CRITICIZING YOUR DRINKING: YES
PAROXYSMAL SWEATS: NO SWEAT VISIBLE
AGITATION: SOMEWHAT MORE THAN NORMAL ACTIVITY
AUDITORY DISTURBANCES: NOT PRESENT
PAROXYSMAL SWEATS: NO SWEAT VISIBLE
ORIENTATION AND CLOUDING OF SENSORIUM: ORIENTED AND CAN DO SERIAL ADDITIONS
HEADACHE, FULLNESS IN HEAD: NOT PRESENT
VISUAL DISTURBANCES: NOT PRESENT
VISUAL DISTURBANCES: NOT PRESENT
TREMOR: TREMOR NOT VISIBLE BUT CAN BE FELT, FINGERTIP TO FINGERTIP
AUDITORY DISTURBANCES: NOT PRESENT
ANXIETY: MODERATELY ANXIOUS OR GUARDED, SO ANXIETY IS INFERRED
PAROXYSMAL SWEATS: NO SWEAT VISIBLE
DOES PATIENT WANT TO TALK TO SOMEONE ABOUT QUITTING: YES
AGITATION: SOMEWHAT MORE THAN NORMAL ACTIVITY
CONSUMPTION TOTAL: INCOMPLETE
TOTAL SCORE: 4
TOTAL SCORE: 3
ANXIETY: MILDLY ANXIOUS
AUDITORY DISTURBANCES: NOT PRESENT
TOTAL SCORE: 3
VISUAL DISTURBANCES: NOT PRESENT

## 2017-06-21 ASSESSMENT — PAIN SCALES - GENERAL
PAINLEVEL_OUTOF10: 0
PAINLEVEL_OUTOF10: 3
PAINLEVEL_OUTOF10: 0
PAINLEVEL_OUTOF10: 0
PAINLEVEL_OUTOF10: 4

## 2017-06-21 NOTE — IP AVS SNAPSHOT
[employer] Access Code: [email or web address]  Expires: 7/9/2017  4:15 AM    [employer]  A secure, online tool to manage your health information     [employer]’s [employer]® is a secure, online tool that connects you to your personalized health information from the privacy of your home -- day or night - making it very easy for you to manage your healthcare. Once the activation process is completed, you can even access your medical information using the [employer] norris, which is available for free in the Apple Norris store or Google Play store.     [employer] provides the following levels of access (as shown below):   My Chart Features   [facility] Primary Care Doctor [facility]  Specialists [facility]  Urgent  Care Non-[facility]  Primary Care  Doctor   Email your healthcare team securely and privately 24/7 X X X X   Manage appointments: schedule your next appointment; view details of past/upcoming appointments X      Request prescription refills. X      View recent personal medical records, including lab and immunizations X X X X   View health record, including health history, allergies, medications X X X X   Read reports about your outpatient visits, procedures, consult and ER notes X X X X   See your discharge summary, which is a recap of your hospital and/or ER visit that includes your diagnosis, lab results, and care plan. X X       How to register for [employer]:  1. Go to  [email or web address].  2. Click on the Sign Up Now box, which takes you to the New Member Sign Up page. You will need to provide the following information:  a. Enter your [employer] Access Code exactly as it appears at the top of this page. (You will not need to use this code after you’ve completed the sign-up process. If you do not sign up before the expiration date, you must request a new code.)   b. Enter your date of birth.   c. Enter your home email address.   d. Click Submit, and follow the next screen’s instructions.  3. Create a [employer] ID. This will be your [employer]  login ID and cannot be changed, so think of one that is secure and easy to remember.  4. Create a [employer] password. You can change your password at any time.  5. Enter your Password Reset Question and Answer. This can be used at a later time if you forget your password.   6. Enter your e-mail address. This allows you to receive e-mail notifications when new information is available in [employer].  7. Click Sign Up. You can now view your health information.    For assistance activating your [employer] account, call [phone]

## 2017-06-21 NOTE — ED NOTES
"Chief Complaint   Patient presents with   • Seizure     /100 mmHg  Pulse 115  Temp(Src) 37.1 °C (98.7 °F)  Resp 16  Ht 1.727 m (5' 8\")  Wt 72.576 kg (160 lb)  BMI 24.33 kg/m2  SpO2 98%    BIBA -  woke up to pt seizing, states it lasted about 1 min. Pt has no seizure hx. TBI 2006. Per [facility]  told them pt is a heavy drinker, unsure when her last drink was.  Pt denies EtOH abuse. Pt reports n/v/d yesterday. Connected to monitor.   Chart up for eval.    "

## 2017-06-21 NOTE — IP AVS SNAPSHOT
" Home Care Instructions                                                                                                                  Name:Zari Day  Medical Record Number:[id number]  CSN: [id number]    YOB: 1965   Age: 51 y.o.  Sex: female  HT:1.727 m (5' 8\") WT: 78.8 kg (173 lb 11.6 oz)          Admit Date: 6/21/2017     Discharge Date:   Today's Date: 6/22/2017  Attending Doctor:  Deya Spencer M.D.                  Allergies:  Review of patient's allergies indicates no known allergies.            Discharge Instructions       Discharge Instructions    Discharged to home by car with relative. Discharged via wheelchair, hospital escort: Refused.  Special equipment needed: Not Applicable    Be sure to schedule a follow-up appointment with your primary care doctor or any specialists as instructed.     Discharge Plan:   Diet Plan: Discussed  Activity Level: Discussed  Confirmed Follow up Appointment: Patient to Call and Schedule Appointment  Confirmed Symptoms Management: Discussed  Medication Reconciliation Updated: Yes  Influenza Vaccine Indication: Indicated: Not available from distributor/    I understand that a diet low in cholesterol, fat, and sodium is recommended for good health. Unless I have been given specific instructions below for another diet, I accept this instruction as my diet prescription.   Other diet: Heart-Healthy Eating Plan  Many factors influence your heart health, including eating and exercise habits. Heart (coronary) risk increases with abnormal blood fat (lipid) levels. Heart-healthy meal planning includes limiting unhealthy fats, increasing healthy fats, and making other small dietary changes. This includes maintaining a healthy body weight to help keep lipid levels within a normal range.  WHAT IS MY PLAN?   Your health care provider recommends that you:  · Get no more than _________% of the total calories in your daily diet from fat.  · Limit your intake of " "saturated fat to less than _________% of your total calories each day.  · Limit the amount of cholesterol in your diet to less than _________ mg per day.  WHAT TYPES OF FAT SHOULD I CHOOSE?  · Choose healthy fats more often. Choose monounsaturated and polyunsaturated fats, such as olive oil and canola oil, flaxseeds, walnuts, almonds, and seeds.  · Eat more omega-3 fats. Good choices include salmon, mackerel, sardines, tuna, flaxseed oil, and ground flaxseeds. Aim to eat fish at least two times each week.  · Limit saturated fats. Saturated fats are primarily found in animal products, such as meats, butter, and cream. Plant sources of saturated fats include palm oil, palm kernel oil, and coconut oil.  · Avoid foods with partially hydrogenated oils in them. These contain trans fats. Examples of foods that contain trans fats are stick margarine, some tub margarines, cookies, crackers, and other baked goods.  WHAT GENERAL GUIDELINES DO I NEED TO FOLLOW?  · Check food labels carefully to identify foods with trans fats or high amounts of saturated fat.  · Fill one half of your plate with vegetables and green salads. Eat 4-5 servings of vegetables per day. A serving of vegetables equals 1 cup of raw leafy vegetables, ½ cup of raw or cooked cut-up vegetables, or ½ cup of vegetable juice.  · Fill one fourth of your plate with whole grains. Look for the word \"whole\" as the first word in the ingredient list.  · Fill one fourth of your plate with lean protein foods.  · Eat 4-5 servings of fruit per day. A serving of fruit equals one medium whole fruit, ¼ cup of dried fruit, ½ cup of fresh, frozen, or canned fruit, or ½ cup of 100% fruit juice.  · Eat more foods that contain soluble fiber. Examples of foods that contain this type of fiber are apples, broccoli, carrots, beans, peas, and barley. Aim to get 20-30 g of fiber per day.  · Eat more home-cooked food and less restaurant, buffet, and fast food.  · Limit or avoid " alcohol.  · Limit foods that are high in starch and sugar.  · Avoid fried foods.  · Cook foods by using methods other than frying. Baking, boiling, grilling, and broiling are all great options. Other fat-reducing suggestions include:  · Removing the skin from poultry.  · Removing all visible fats from meats.  · Skimming the fat off of stews, soups, and gravies before serving them.  · Steaming vegetables in water or broth.  · Lose weight if you are overweight. Losing just 5-10% of your initial body weight can help your overall health and prevent diseases such as diabetes and heart disease.  · Increase your consumption of nuts, legumes, and seeds to 4-5 servings per week. One serving of dried beans or legumes equals ½ cup after being cooked, one serving of nuts equals 1½ ounces, and one serving of seeds equals ½ ounce or 1 tablespoon.  · You may need to monitor your salt (sodium) intake, especially if you have high blood pressure. Talk with your health care provider or dietitian to get more information about reducing sodium.  WHAT FOODS CAN I EAT?  Grains  Breads, including Romansh, white, judith, wheat, raisin, rye, oatmeal, and Italian. Tortillas that are neither fried nor made with lard or trans fat. Low-fat rolls, including hotdog and hamburger buns and English muffins. Biscuits. Muffins. Waffles. Pancakes. Light popcorn. Whole-grain cereals. Flatbread. Cristal toast. Pretzels. Breadsticks. Rusks. Low-fat snacks and crackers, including oyster, saltine, matzo, eulalio, animal, and rye. Rice and pasta, including brown rice and those that are made with whole wheat.  Vegetables  All vegetables.  Fruits  All fruits, but limit coconut.  Meats and Other Protein Sources  Lean, well-trimmed beef, veal, pork, and lamb. Chicken and turkey without skin. All fish and shellfish. Wild duck, rabbit, pheasant, and venison. Egg whites or low-cholesterol egg substitutes. Dried beans, peas, lentils, and tofu. Seeds and most  nuts.  Dairy  Low-fat or nonfat cheeses, including ricotta, string, and mozzarella. Skim or 1% milk that is liquid, powdered, or evaporated. Buttermilk that is made with low-fat milk. Nonfat or low-fat yogurt.  Beverages  Mineral water. Diet carbonated beverages.  Sweets and Desserts  Sherbets and fruit ices. Honey, jam, marmalade, jelly, and syrups. Meringues and gelatins. Pure sugar candy, such as hard candy, jelly beans, gumdrops, mints, marshmallows, and small amounts of dark chocolate. Esau food cake.  Eat all sweets and desserts in moderation.  Fats and Oils  Nonhydrogenated (trans-free) margarines. Vegetable oils, including soybean, sesame, sunflower, olive, peanut, safflower, corn, canola, and cottonseed. Salad dressings or mayonnaise that are made with a vegetable oil. Limit added fats and oils that you use for cooking, baking, salads, and as spreads.  Other  Cocoa powder. Coffee and tea. All seasonings and condiments.  The items listed above may not be a complete list of recommended foods or beverages. Contact your dietitian for more options.  WHAT FOODS ARE NOT RECOMMENDED?  Grains  Breads that are made with saturated or trans fats, oils, or whole milk. Croissants. Butter rolls. Cheese breads. Sweet rolls. Donuts. Buttered popcorn. Chow mein noodles. High-fat crackers, such as cheese or butter crackers.  Meats and Other Protein Sources  Fatty meats, such as hotdogs, short ribs, sausage, spareribs, lee, ribeye roast or steak, and mutton. High-fat deli meats, such as salami and bologna. Caviar. Domestic duck and goose. Organ meats, such as kidney, liver, sweetbreads, brains, gizzard, chitterlings, and heart.  Dairy  Cream, sour cream, cream cheese, and creamed cottage cheese. Whole milk cheeses, including blue (bernadette), [place], Brie, Ruben, American, Havarti, Swiss, cheddar, Camembert, and Wyatt.  Whole or 2% milk that is liquid, evaporated, or condensed. Whole buttermilk. Cream sauce or high-fat  cheese sauce. Yogurt that is made from whole milk.  Beverages  Regular sodas and drinks with added sugar.  Sweets and Desserts  Frosting. Pudding. Cookies. Cakes other than carlos food cake. Candy that has milk chocolate or white chocolate, hydrogenated fat, butter, coconut, or unknown ingredients. Buttered syrups. Full-fat ice cream or ice cream drinks.  Fats and Oils  Gravy that has suet, meat fat, or shortening. Cocoa butter, hydrogenated oils, palm oil, coconut oil, palm kernel oil. These can often be found in baked products, candy, fried foods, nondairy creamers, and whipped toppings. Solid fats and shortenings, including lee fat, salt pork, lard, and butter. Nondairy cream substitutes, such as coffee creamers and sour cream substitutes. Salad dressings that are made of unknown oils, cheese, or sour cream.  The items listed above may not be a complete list of foods and beverages to avoid. Contact your dietitian for more information.     This information is not intended to replace advice given to you by your health care provider. Make sure you discuss any questions you have with your health care provider.     Document Released: 09/26/2009 Document Revised: 01/08/2016 Document Reviewed: 06/11/2015  [employer] Patient Education ©2016 [employer]      Special Instructions:   Follow up in AA meetings     Make an appointment with your primary care provider and get repeat lab work in 1-2 months        Alcohol Withdrawal  Alcohol withdrawal is a group of symptoms that can develop when a person who drinks heavily and regularly stops drinking or drinks less.  CAUSES  Heavy and regular drinking can cause chemicals that send signals from the brain to the body (neurotransmitters) to deactivate. Alcohol withdrawal develops when deactivated neurotransmitters reactivate because a person stops drinking or drinks less.  RISK FACTORS  The more a person drinks and the longer he or she drinks, the greater the risk of  alcohol withdrawal. Severe withdrawal is more likely to develop in someone who:  · Had severe alcohol withdrawal in the past.  · Had a seizure during a previous episode of alcohol withdrawal.  · Is elderly.  · Is pregnant.  · Has been abusing drugs.  · Has other medical problems, including:  ¨ Infection.  ¨ Heart, lung, or liver disease.  ¨ Seizures.  ¨ Mental health problems.  SYMPTOMS  Symptoms of this condition can be mild to moderate, or they can be severe.  Mild to moderate symptoms may include:  · Fatigue.  · Nightmares.  · Trouble sleeping.  · Depression.  · Anxiety.  · Inability to think clearly.  · Mood swings.  · Irritability.  · Loss of appetite.  · Nausea or vomiting.  · Clammy skin.  · Extreme sweating.  · Rapid heartbeat.  · Shakiness.  · Uncontrollable shaking (tremor).  Severe symptoms may include:  · Fever.  · Seizures.  · Severe confusion.  · Feeling or seeing things that are not there (hallucinations).  Symptoms usually begin within eight hours after a person stops drinking or drinks less. They can last for weeks.  DIAGNOSIS  Alcohol withdrawal is diagnosed with a medical history and physical exam. Sometimes, urine and blood tests are also done.  TREATMENT  Treatment may involve:  · Monitoring blood pressure, pulse, and breathing.  · Getting fluids through an IV tube.  · Medicine to reduce anxiety.  · Medicine to prevent or control seizures.  · Multivitamins and B vitamins.  · Having a health care provider check on you daily.  If symptoms are moderate to severe or if there is a risk of severe withdrawal, treatment may be done at a hospital or treatment center.  HOME CARE INSTRUCTIONS  · Take medicines and vitamin supplements only as directed by your health care provider.  · Do not drink alcohol.  · Have someone stay with you or be available if you need help.  · Drink enough fluid to keep your urine clear or pale yellow.  · Consider joining a 12-step program or another alcohol support group.  SEEK  MEDICAL CARE IF:  · Your symptoms get worse or do not go away.  · You cannot keep food or water in your stomach.  · You are struggling with not drinking alcohol.  · You cannot stop drinking alcohol.  SEEK IMMEDIATE MEDICAL CARE IF:   · You have an irregular heartbeat.  · You have chest pain.  · You have trouble breathing.  · You have symptoms of severe withdrawal, such as:  ¨ A fever.  ¨ Seizures.  ¨ Severe confusion.  ¨ Hallucinations.     This information is not intended to replace advice given to you by your health care provider. Make sure you discuss any questions you have with your health care provider.     Document Released: 09/27/2006 Document Revised: 01/08/2016 Document Reviewed: 10/06/2015  [employer] Interactive Patient Education ©2016 [employer]    · Is patient discharged on Warfarin / Coumadin?   No     · Is patient Post Blood Transfusion?  No    Depression / Suicide Risk    As you are discharged from this [facility] facility, it is important to learn how to keep safe from harming yourself.    Recognize the warning signs:  · Abrupt changes in personality, positive or negative- including increase in energy   · Giving away possessions  · Change in eating patterns- significant weight changes-  positive or negative  · Change in sleeping patterns- unable to sleep or sleeping all the time   · Unwillingness or inability to communicate  · Depression  · Unusual sadness, discouragement and loneliness  · Talk of wanting to die  · Neglect of personal appearance   · Rebelliousness- reckless behavior  · Withdrawal from people/activities they love  · Confusion- inability to concentrate     If you or a loved one observes any of these behaviors or has concerns about self-harm, here's what you can do:  · Talk about it- your feelings and reasons for harming yourself  · Remove any means that you might use to hurt yourself (examples: pills, rope, extension cords, firearm)  · Get professional help from the community  (Mental Health, Substance Abuse, psychological counseling)  · Do not be alone:Call your Safe Contact- someone whom you trust who will be there for you.  · Call your local CRISIS HOTLINE 627-5932 or 638-900-3905  · Call your local Children's Mobile Crisis Response Team [place] (695) 855-8050 or www.[employer]  · Call the toll free National Suicide Prevention Hotlines   · National Suicide Prevention Lifeline 510-955-BZZY (7622)  · National Hope Line Network 800-SUICIDE (000-2269)           Discharge Medication Instructions:    Below are the medications your physician expects you to take upon discharge:    Review all your home medications and newly ordered medications with your doctor and/or pharmacist. Follow medication instructions as directed by your doctor and/or pharmacist.    Please keep your medication list with you and share with your physician.               Medication List      START taking these medications        Instructions    Morning Afternoon Evening Bedtime    gabapentin 100 MG Caps   Commonly known as:  NEURONTIN        Take 1 Cap by mouth every bedtime.   Dose:  100 mg                        lorazepam 0.5 MG Tabs   Last time this was given:  1 mg on 6/21/2017  5:24 PM   Commonly known as:  ATIVAN        Take 1 Tab by mouth every four hours as needed for Anxiety.   Dose:  0.5 mg                          CONTINUE taking these medications        Instructions    Morning Afternoon Evening Bedtime    cyclobenzaprine 10 MG Tabs   Commonly known as:  FLEXERIL        Take 10 mg by mouth 3 times a day as needed for Muscle Spasms.   Dose:  10 mg                             Where to Get Your Medications      These medications were sent to [facility]  [place], [place] - [address]  [address] [place] [address]     Phone:  [phone]    - gabapentin 100 MG Caps      Information about where to get these medications is not yet available     ! Ask your nurse or doctor about  these medications    - lorazepam 0.5 MG Tabs            Instructions           Diet / Nutrition:    Follow any diet instructions given to you by your doctor or the dietician, including how much salt (sodium) you are allowed each day.    If you are overweight, talk to your doctor about a weight reduction plan.    Activity:    Remain physically active following your doctor's instructions about exercise and activity.    Rest often.     Any time you become even a little tired or short of breath, SIT DOWN and rest.    Worsening Symptoms:    Report any of the following signs and symptoms to the doctor's office immediately:    *Pain of jaw, arm, or neck  *Chest pain not relieved by medication                               *Dizziness or loss of consciousness  *Difficulty breathing even when at rest   *More tired than usual                                       *Bleeding drainage or swelling of surgical site  *Swelling of feet, ankles, legs or stomach                 *Fever (>100ºF)  *Pink or blood tinged sputum  *Weight gain (3lbs/day or 5lbs /week)           *Shock from internal defibrillator (if applicable)  *Palpitations or irregular heartbeats                *Cool and/or numb extremities    Stroke Awareness    Common Risk Factors for Stroke include:    Age  Atrial Fibrillation  Carotid Artery Stenosis  Diabetes Mellitus  Excessive alcohol consumption  High blood pressure  Overweight   Physical inactivity  Smoking    Warning signs and symptoms of a stroke include:    *Sudden numbness or weakness of the face, arm or leg (especially on one side of the body).  *Sudden confusion, trouble speaking or understanding.  *Sudden trouble seeing in one or both eyes.  *Sudden trouble walking, dizziness, loss of balance or coordination.Sudden severe headache with no known cause.    It is very important to get treatment quickly when a stroke occurs. If you experience any of the above warning signs, call 911 immediately.                    Disclaimer         Quit Smoking / Tobacco Use:    I understand the use of any tobacco products increases my chance of suffering from future heart disease or stroke and could cause other illnesses which may shorten my life. Quitting the use of tobacco products is the single most important thing I can do to improve my health. For further information on smoking / tobacco cessation call a Toll Free Quit Line at 1-812.549.5183 (*National [employer] [place]) or 1-154.959.6437 (American Lung Association) or you can access the web based program at www.lungusa.org.    [place] Tobacco Users Help Line:  (584) 918-4130       Toll Free: 1-518.545.9075  Quit Tobacco Program [facility] (413)928-2058    Crisis Hotline:    [place] Crisis Hotline:  8-788-JZNWCXX or 1-182.756.3330    [place] Crisis Hotline:    1-741.250.7681 or 070-182-8221    Discharge Survey:   Thank you for choosing [facility]. We hope we did everything we could to make your hospital stay a pleasant one. You may be receiving a phone survey and we would appreciate your time and participation in answering the questions. Your input is very valuable to us in our efforts to improve our service to our patients and their families.        My signature on this form indicates that:    1. I have reviewed and understand the above information.  2. My questions regarding this information have been answered to my satisfaction.  3. I have formulated a plan with my discharge nurse to obtain my prescribed medications for home.                  Disclaimer         __________________________________                     __________       ________                       Patient Signature                                                 Date                    Time

## 2017-06-21 NOTE — ED PROVIDER NOTES
"ED Provider Note    Scribed for Ez Mackay M.D. by Noemy Wahl. 6/21/2017  5:49 AM    Primary care provider: Pcp Pt States None  Means of arrival: EMS  History obtained from: Patient  History limited by: None    CHIEF COMPLAINT  Chief Complaint   Patient presents with   • Seizure       HPI  Zari Day is a 51 y.o. female who presents to the Emergency Department for evaluation of a seizure onset this morning. Per patient's , he woke up with the patient having a seizure which lasted about 10 minutes. The patient reports that she bit her tongue during the event. She states that she is a heavy drinker and her last drink was four days ago. Per patient, she does not have a history of seizures. She reports that she was also experiencing nausea, vomiting, and diarrhea yesterday.    REVIEW OF SYSTEMS  Pertinent positives include seizure, nausea, vomiting, and diarrhea.   Pertinent negatives include no headache.    All other systems reviewed and negative.    C.    PAST MEDICAL HISTORY    The patient has no chronic medical history.    SURGICAL HISTORY  patient denies any surgical history    SOCIAL HISTORY  Social History   Substance Use Topics   • Smoking status: Never Smoker    • Smokeless tobacco: Never Used   • Alcohol Use: No      Comment: denies      History   Drug Use No       FAMILY HISTORY  Family History   Problem Relation Age of Onset   • Hypertension Mother    • Cancer Mother      breast   • Hypertension Father        CURRENT MEDICATIONS  Home Medications     Reviewed by Theresa Shoemaker R.N. (Registered Nurse) on 06/21/17 at 0500  Med List Status: <None>    Medication Last Dose Status    ibuprofen (MOTRIN) 800 MG Tab  Active                ALLERGIES  No Known Allergies    PHYSICAL EXAM  VITAL SIGNS: /100 mmHg  Pulse 111  Temp(Src) 37.1 °C (98.7 °F)  Resp 16  Ht 1.727 m (5' 8\")  Wt 72.576 kg (160 lb)  BMI 24.33 kg/m2  SpO2 98%    Nursing note and vitals " reviewed.  Constitutional: Well-developed and well-nourished. No distress. Slightly tremulous  HENT: Head is normocephalic and atraumatic. Oropharynx is clear and moist without exudate or erythema. Ecchymosis to right side of tongue  Eyes: Pupils are equal, round, and reactive to light. Conjunctiva are normal.   Cardiovascular: tachycardic. No murmur heard. Normal radial pulses.  Pulmonary/Chest: Breath sounds normal. No wheezes or rales.   Abdominal: Soft and non-tender. No distention    Musculoskeletal: Extremities exhibit normal range of motion without edema or tenderness.   Neurological: Awake, alert and oriented to person, place, and time. No focal deficits noted.  Skin: Skin is warm and dry. No rash.   Psychiatric: Normal mood and affect. Appropriate for clinical situation.    DIAGNOSTIC STUDIES / PROCEDURES    LABS  Results for orders placed or performed during the hospital encounter of 06/21/17   CBC WITH DIFFERENTIAL   Result Value Ref Range    WBC 1.9 (LL) 4.8 - 10.8 K/uL    RBC 3.77 (L) 4.20 - 5.40 M/uL    Hemoglobin 13.1 12.0 - 16.0 g/dL    Hematocrit 37.1 37.0 - 47.0 %    MCV 98.4 (H) 81.4 - 97.8 fL    MCH 34.7 (H) 27.0 - 33.0 pg    MCHC 35.3 (H) 33.6 - 35.0 g/dL    RDW 50.2 (H) 35.9 - 50.0 fL    Platelet Count 46 (LL) 164 - 446 K/uL    MPV 9.8 9.0 - 12.9 fL    Neutrophils-Polys 66.80 44.00 - 72.00 %    Lymphocytes 15.30 (L) 22.00 - 41.00 %    Monocytes 15.80 (H) 0.00 - 13.40 %    Eosinophils 0.50 0.00 - 6.90 %    Basophils 1.60 0.00 - 1.80 %    Immature Granulocytes 0.00 0.00 - 0.90 %    Nucleated RBC 0.00 /100 WBC    Lymphs (Absolute) 0.29 (L) 1.00 - 4.80 K/uL    Monos (Absolute) 0.30 0.00 - 0.85 K/uL    Eos (Absolute) 0.01 0.00 - 0.51 K/uL    Baso (Absolute) 0.03 0.00 - 0.12 K/uL    Immature Granulocytes (abs) 0.00 0.00 - 0.11 K/uL    NRBC (Absolute) 0.00 K/uL    Neutrophils (Absolute) 1.27 (L) 2.00 - 7.15 K/uL   COMP METABOLIC PANEL   Result Value Ref Range    Sodium 138 135 - 145 mmol/L     Potassium 3.4 (L) 3.6 - 5.5 mmol/L    Chloride 102 96 - 112 mmol/L    Co2 23 20 - 33 mmol/L    Anion Gap 13.0 (H) 0.0 - 11.9    Glucose 165 (H) 65 - 99 mg/dL    Bun 12 8 - 22 mg/dL    Creatinine 0.69 0.50 - 1.40 mg/dL    Calcium 10.3 8.5 - 10.5 mg/dL    AST(SGOT) 128 (H) 12 - 45 U/L    ALT(SGPT) 64 (H) 2 - 50 U/L    Alkaline Phosphatase 157 (H) 30 - 99 U/L    Total Bilirubin 4.1 (H) 0.1 - 1.5 mg/dL    Albumin 4.8 3.2 - 4.9 g/dL    Total Protein 7.3 6.0 - 8.2 g/dL    Globulin 2.5 1.9 - 3.5 g/dL    A-G Ratio 1.9 g/dL   ESTIMATED GFR   Result Value Ref Range    GFR If African American >60 >60 mL/min/1.73 m 2    GFR If Non African American >60 >60 mL/min/1.73 m 2   PERIPHERAL SMEAR REVIEW   Result Value Ref Range    Peripheral Smear Review see below    IMMATURE PLT FRACTION   Result Value Ref Range    Imm. Plt Fraction 8.0 0.6 - 13.1 K/uL   DIFFERENTIAL COMMENT   Result Value Ref Range    Comments-Diff see below      All labs reviewed by me.    RADIOLOGY  No orders to display     The radiologist's interpretation of all radiological studies have been reviewed by me.    COURSE & MEDICAL DECISION MAKING  Nursing notes, VS, PMSFHx reviewed in chart.     Review of past medical records shows the patient has no recent ER visit.     5:49 AM - Patient seen and examined at bedside. Patient will be treated with 1000 ml IV ER detox and 2 mg Ativan injection. Ordered CBC with differential and CMP to evaluate her symptoms. Patient had a seizure likely secondary to alcohol withdrawal     7:26 AM Spoke with Dr. Patrick, Hospitalist, about the patient's condition. He agreed to admit the patient.    7:35 AM Recheck: Patient re-evaluated at beside. Discussed patient's condition and treatment plan. Patient's concerning lab results discussed. I informed the patient that her WBC level is abnormal and that her liver is inflamed.The patient understood and is in agreement.     The patient presents today with alcohol withdrawal and evidence of a  severe alcoholism. She has fairly profound leukopenia and thrombocytopenia. She has elevated liver function tests. She requires admission to the hospital for further evaluation and treatment.    CRITICAL CARE  I provided critical care services, which included medication orders, frequent reevaluations of the patient's condition and response to treatment, ordering and reviewing test results, and discussing the case with various consultants.  The critical care time associated with the care of the patient was 35 minutes. Review chart for interventions. This time is exclusive of any other billable procedures.        DISPOSITION:  Patient will be admitted to Dr. Patrick in stable condition.    FINAL IMPRESSION  1. Alcohol withdrawal, with unspecified complication (CMS-HCC)    2. Seizure due to alcohol withdrawal, with unspecified complication (CMS-HCC)    3. Leukopenia, unspecified type    4. Thrombocytopenia (CMS-HCC)    5. Alcoholic hepatitis without ascites          Noemy FONTENOT (Bhakti), am scribing for, and in the presence of, Ez Mackay M.D..    Electronically signed by: Noemy Velásquez), 6/21/2017    IEz M.D. personally performed the services described in this documentation, as scribed by Noemy Wahl in my presence, and it is both accurate and complete.    The note accurately reflects work and decisions made by me.  Ez Mackay  6/21/2017  11:18 AM

## 2017-06-21 NOTE — ED NOTES
"Med rec updated and complete  Allergies reviewed  Pt states \"No antibiotics in the last 30 days\".  Pt states \"No vitamins or OTC'S\".      "

## 2017-06-21 NOTE — IP AVS SNAPSHOT
" <p align=\"LEFT\"><IMG SRC=\"[email or web address]\" alt=\"Image\" WIDTH=\"50%\" HEIGHT=\"200\" BORDER=\"\"></p>                   Name:Zari Day  Medical Record Number:[id number]  CSN: [id number]    YOB: 1965   Age: 51 y.o.  Sex: female  HT:1.727 m (5' 8\") WT: 78.8 kg (173 lb 11.6 oz)          Admit Date: 6/21/2017     Discharge Date:   Today's Date: 6/22/2017  Attending Doctor:  Deya Spencer M.D.                  Allergies:  Review of patient's allergies indicates no known allergies.             Medication List      Take these Medications        Instructions    cyclobenzaprine 10 MG Tabs   Commonly known as:  FLEXERIL    Take 10 mg by mouth 3 times a day as needed for Muscle Spasms.   Dose:  10 mg       gabapentin 100 MG Caps   Commonly known as:  NEURONTIN    Take 1 Cap by mouth every bedtime.   Dose:  100 mg       lorazepam 0.5 MG Tabs   Commonly known as:  ATIVAN    Take 1 Tab by mouth every four hours as needed for Anxiety.   Dose:  0.5 mg         "

## 2017-06-21 NOTE — IP AVS SNAPSHOT
6/22/2017    Zari Day  [address]   [facility] [address]    Dear Zari:    [facility] wants to ensure your discharge home is safe and you or your loved ones have had all of your questions answered regarding your care after you leave the hospital.    Below is a list of resources and contact information should you have any questions regarding your hospital stay, follow-up instructions, or active medical symptoms.    Questions or Concerns Regarding… Contact   Medical Questions Related to Your Discharge  (7 days a week, 8am-5pm) Contact a Nurse Care Coordinator   [phone]   Medical Questions Not Related to Your Discharge  (24 hours a day / 7 days a week)  Contact the Nurse Health Line   [phone]    Medications or Discharge Instructions Refer to your discharge packet   or contact your [facility] Primary Care Provider   [phone]   Follow-up Appointment(s) Schedule your appointment via [employer]   or contact Scheduling [phone]   Billing Review your statement via [employer]  or contact Billing [phone]   Medical Records Review your records via [employer]   or contact Medical Records [phone]     You may receive a telephone call within two days of discharge. This call is to make certain you understand your discharge instructions and have the opportunity to have any questions answered. You can also easily access your medical information, test results and upcoming appointments via the [employer] free online health management tool. You can learn more and sign up at [employer]. For assistance setting up your [employer] account, please call [phone].    Once again, we want to ensure your discharge home is safe and that you have a clear understanding of any next steps in your care. If you have any questions or concerns, please do not hesitate to contact us, we are here for you. Thank you for choosing [facility] for your healthcare needs.    Sincerely,    Your [facility] Healthcare Team

## 2017-06-22 ENCOUNTER — APPOINTMENT (OUTPATIENT)
Dept: RADIOLOGY | Facility: MEDICAL CENTER | Age: 52
DRG: 897 | End: 2017-06-22
Attending: HOSPITALIST
Payer: COMMERCIAL

## 2017-06-22 VITALS
HEART RATE: 114 BPM | SYSTOLIC BLOOD PRESSURE: 133 MMHG | OXYGEN SATURATION: 94 % | WEIGHT: 173.72 LBS | BODY MASS INDEX: 26.33 KG/M2 | DIASTOLIC BLOOD PRESSURE: 93 MMHG | HEIGHT: 68 IN | RESPIRATION RATE: 18 BRPM | TEMPERATURE: 98.7 F

## 2017-06-22 LAB
ALBUMIN SERPL BCP-MCNC: 3.9 G/DL (ref 3.2–4.9)
ALBUMIN/GLOB SERPL: 1.6 G/DL
ALP SERPL-CCNC: 139 U/L (ref 30–99)
ALT SERPL-CCNC: 52 U/L (ref 2–50)
ANION GAP SERPL CALC-SCNC: 9 MMOL/L (ref 0–11.9)
ANISOCYTOSIS BLD QL SMEAR: ABNORMAL
AST SERPL-CCNC: 110 U/L (ref 12–45)
BASOPHILS # BLD AUTO: 2.6 % (ref 0–1.8)
BASOPHILS # BLD: 0.07 K/UL (ref 0–0.12)
BILIRUB SERPL-MCNC: 4.1 MG/DL (ref 0.1–1.5)
BUN SERPL-MCNC: 7 MG/DL (ref 8–22)
CALCIUM SERPL-MCNC: 9.7 MG/DL (ref 8.5–10.5)
CHLORIDE SERPL-SCNC: 106 MMOL/L (ref 96–112)
CO2 SERPL-SCNC: 23 MMOL/L (ref 20–33)
CREAT SERPL-MCNC: 0.64 MG/DL (ref 0.5–1.4)
EOSINOPHIL # BLD AUTO: 0.07 K/UL (ref 0–0.51)
EOSINOPHIL NFR BLD: 2.6 % (ref 0–6.9)
ERYTHROCYTE [DISTWIDTH] IN BLOOD BY AUTOMATED COUNT: 51.4 FL (ref 35.9–50)
GFR SERPL CREATININE-BSD FRML MDRD: >60 ML/MIN/1.73 M 2
GLOBULIN SER CALC-MCNC: 2.4 G/DL (ref 1.9–3.5)
GLUCOSE SERPL-MCNC: 89 MG/DL (ref 65–99)
HAV IGM SERPL QL IA: NEGATIVE
HBV CORE IGM SER QL: NEGATIVE
HBV SURFACE AG SER QL: NEGATIVE
HCT VFR BLD AUTO: 34.6 % (ref 37–47)
HCV AB SER QL: NEGATIVE
HGB BLD-MCNC: 11.9 G/DL (ref 12–16)
LYMPHOCYTES # BLD AUTO: 0.89 K/UL (ref 1–4.8)
LYMPHOCYTES NFR BLD: 35.7 % (ref 22–41)
MACROCYTES BLD QL SMEAR: ABNORMAL
MAGNESIUM SERPL-MCNC: 2.1 MG/DL (ref 1.5–2.5)
MANUAL DIFF BLD: NORMAL
MCH RBC QN AUTO: 34.8 PG (ref 27–33)
MCHC RBC AUTO-ENTMCNC: 34.4 G/DL (ref 33.6–35)
MCV RBC AUTO: 101.2 FL (ref 81.4–97.8)
MONOCYTES # BLD AUTO: 0.13 K/UL (ref 0–0.85)
MONOCYTES NFR BLD AUTO: 5.2 % (ref 0–13.4)
MORPHOLOGY BLD-IMP: NORMAL
MYELOCYTES NFR BLD MANUAL: 0.9 %
NEUTROPHILS # BLD AUTO: 1.33 K/UL (ref 2–7.15)
NEUTROPHILS NFR BLD: 50.4 % (ref 44–72)
NEUTS BAND NFR BLD MANUAL: 2.6 % (ref 0–10)
NRBC # BLD AUTO: 0 K/UL
NRBC BLD AUTO-RTO: 0 /100 WBC
PHOSPHATE SERPL-MCNC: 1.9 MG/DL (ref 2.5–4.5)
PLATELET # BLD AUTO: 42 K/UL (ref 164–446)
PLATELET BLD QL SMEAR: NORMAL
PLATELETS.RETICULATED NFR BLD AUTO: 8.1 K/UL (ref 0.6–13.1)
PMV BLD AUTO: 10.4 FL (ref 9–12.9)
POTASSIUM SERPL-SCNC: 3.7 MMOL/L (ref 3.6–5.5)
PROT SERPL-MCNC: 6.3 G/DL (ref 6–8.2)
RBC # BLD AUTO: 3.42 M/UL (ref 4.2–5.4)
RBC BLD AUTO: PRESENT
SODIUM SERPL-SCNC: 138 MMOL/L (ref 135–145)
WBC # BLD AUTO: 2.5 K/UL (ref 4.8–10.8)

## 2017-06-22 PROCEDURE — 84100 ASSAY OF PHOSPHORUS: CPT

## 2017-06-22 PROCEDURE — 700105 HCHG RX REV CODE 258: Performed by: HOSPITALIST

## 2017-06-22 PROCEDURE — 83735 ASSAY OF MAGNESIUM: CPT

## 2017-06-22 PROCEDURE — 80074 ACUTE HEPATITIS PANEL: CPT

## 2017-06-22 PROCEDURE — 70551 MRI BRAIN STEM W/O DYE: CPT

## 2017-06-22 PROCEDURE — 99239 HOSP IP/OBS DSCHRG MGMT >30: CPT | Performed by: HOSPITALIST

## 2017-06-22 PROCEDURE — 85055 RETICULATED PLATELET ASSAY: CPT

## 2017-06-22 PROCEDURE — 80053 COMPREHEN METABOLIC PANEL: CPT

## 2017-06-22 PROCEDURE — 700111 HCHG RX REV CODE 636 W/ 250 OVERRIDE (IP): Performed by: HOSPITALIST

## 2017-06-22 PROCEDURE — 85007 BL SMEAR W/DIFF WBC COUNT: CPT

## 2017-06-22 PROCEDURE — 85027 COMPLETE CBC AUTOMATED: CPT

## 2017-06-22 PROCEDURE — 36415 COLL VENOUS BLD VENIPUNCTURE: CPT

## 2017-06-22 PROCEDURE — A9270 NON-COVERED ITEM OR SERVICE: HCPCS | Performed by: HOSPITALIST

## 2017-06-22 PROCEDURE — 700102 HCHG RX REV CODE 250 W/ 637 OVERRIDE(OP): Performed by: HOSPITALIST

## 2017-06-22 RX ORDER — LORAZEPAM 0.5 MG/1
0.5 TABLET ORAL EVERY 4 HOURS PRN
Qty: 10 TAB | Refills: 0 | Status: SHIPPED | OUTPATIENT
Start: 2017-06-22 | End: 2017-06-23

## 2017-06-22 RX ORDER — GABAPENTIN 100 MG/1
100 CAPSULE ORAL
Qty: 30 CAP | Refills: 0 | Status: SHIPPED | OUTPATIENT
Start: 2017-06-22 | End: 2018-07-25

## 2017-06-22 RX ADMIN — HEPARIN SODIUM 5000 UNITS: 5000 INJECTION, SOLUTION INTRAVENOUS; SUBCUTANEOUS at 05:32

## 2017-06-22 RX ADMIN — LORAZEPAM 2 MG: 2 INJECTION INTRAMUSCULAR; INTRAVENOUS at 09:12

## 2017-06-22 RX ADMIN — SODIUM CHLORIDE: 9 INJECTION, SOLUTION INTRAVENOUS at 05:32

## 2017-06-22 RX ADMIN — CHLORDIAZEPOXIDE HYDROCHLORIDE 10 MG: 5 CAPSULE ORAL at 05:32

## 2017-06-22 RX ADMIN — FOLIC ACID 1 MG: 1 TABLET ORAL at 09:02

## 2017-06-22 RX ADMIN — Medication 100 MG: at 09:01

## 2017-06-22 ASSESSMENT — LIFESTYLE VARIABLES
VISUAL DISTURBANCES: NOT PRESENT
AGITATION: NORMAL ACTIVITY
ORIENTATION AND CLOUDING OF SENSORIUM: ORIENTED AND CAN DO SERIAL ADDITIONS
NAUSEA AND VOMITING: NO NAUSEA AND NO VOMITING
TOTAL SCORE: 4
AUDITORY DISTURBANCES: NOT PRESENT
ANXIETY: MILDLY ANXIOUS
TREMOR: TREMOR NOT VISIBLE BUT CAN BE FELT, FINGERTIP TO FINGERTIP
ORIENTATION AND CLOUDING OF SENSORIUM: ORIENTED AND CAN DO SERIAL ADDITIONS
ORIENTATION AND CLOUDING OF SENSORIUM: ORIENTED AND CAN DO SERIAL ADDITIONS
HEADACHE, FULLNESS IN HEAD: VERY MILD
HEADACHE, FULLNESS IN HEAD: NOT PRESENT
EVER_SMOKED: NEVER
ANXIETY: *
ANXIETY: *
VISUAL DISTURBANCES: NOT PRESENT
PAROXYSMAL SWEATS: NO SWEAT VISIBLE
TOTAL SCORE: 3
HEADACHE, FULLNESS IN HEAD: NOT PRESENT
AUDITORY DISTURBANCES: NOT PRESENT
NAUSEA AND VOMITING: NO NAUSEA AND NO VOMITING
ANXIETY: MILDLY ANXIOUS
NAUSEA AND VOMITING: NO NAUSEA AND NO VOMITING
AUDITORY DISTURBANCES: NOT PRESENT
AGITATION: SOMEWHAT MORE THAN NORMAL ACTIVITY
TOTAL SCORE: 3
NAUSEA AND VOMITING: NO NAUSEA AND NO VOMITING
TREMOR: TREMOR NOT VISIBLE BUT CAN BE FELT, FINGERTIP TO FINGERTIP
ORIENTATION AND CLOUDING OF SENSORIUM: ORIENTED AND CAN DO SERIAL ADDITIONS
AGITATION: NORMAL ACTIVITY
PAROXYSMAL SWEATS: NO SWEAT VISIBLE
TOTAL SCORE: 3
VISUAL DISTURBANCES: NOT PRESENT
VISUAL DISTURBANCES: NOT PRESENT
PAROXYSMAL SWEATS: NO SWEAT VISIBLE
AUDITORY DISTURBANCES: NOT PRESENT
HEADACHE, FULLNESS IN HEAD: NOT PRESENT
TREMOR: TREMOR NOT VISIBLE BUT CAN BE FELT, FINGERTIP TO FINGERTIP
TREMOR: TREMOR NOT VISIBLE BUT CAN BE FELT, FINGERTIP TO FINGERTIP
AGITATION: SOMEWHAT MORE THAN NORMAL ACTIVITY
PAROXYSMAL SWEATS: NO SWEAT VISIBLE

## 2017-06-22 ASSESSMENT — PAIN SCALES - GENERAL
PAINLEVEL_OUTOF10: 3
PAINLEVEL_OUTOF10: 3

## 2017-06-22 NOTE — PROGRESS NOTES
Bedside report completed.  Assumed pt care. Patient in bed, resting, in no apparent distress.  Safety precautions in place. Call light & personal belongings within reach.  Plan of care discussed.  Patient is on room air, IV is running NS @ 100 mL/hour.  Ambulated to restroom.  States that her tongue hurts where she bit it, but it is bearable at this time.  CIWA score done.  No other needs expressed.  Will continue to monitor.    Patient scheduled for US of liver and biliary tree tomorrow.  Patient is refusing US at this time and does not want to be NPO at midnight.  She does want, and is waiting for, her MRI that is scheduled.

## 2017-06-22 NOTE — CARE PLAN
Problem: Communication  Goal: The ability to communicate needs accurately and effectively will improve  Outcome: PROGRESSING AS EXPECTED  Intervention: Miller patient and significant other/support system to call light to alert staff of needs  Patient oriented to surroundings and unit policy.  Patient educated and understands the use of the call button for assistance with needs or mobility.      Problem: Safety  Goal: Will remain free from falls  Outcome: PROGRESSING SLOWER THAN EXPECTED  Intervention: Implement fall precautions  Fall precautions in place.  Patient educated and understands the importance of the use of the bed alarm and the need for treaded socks.  Patient also educated and understands the use of the call button for assistance with mobility to prevent falls.

## 2017-06-22 NOTE — DIETARY
Nutrition Services: Pt seen for poor po PTA per admit screen    Pt is a 50 yo female with dx of alcohol withdrawal. PMHx includes traumatic brain injury and alcoholism for ~21 years. Pt is currently on a Regular diet. Poor PO intake likely related to heavy alcohol consumption. Anticipate PO intake to improve upon admit. Pt consumed % of her dinner last night.     Pt scheduled for ultrasound and MRI today.   Receiving folic acid and thiamine.     Plan/Recommend: Encourage PO intake. Nutrition Rep to see patient daily for meal preferences. Please document PO intake as percentage of meals consumed. RD to monitor per dept policy.

## 2017-06-22 NOTE — DISCHARGE INSTRUCTIONS
Discharge Instructions    Discharged to home by car with relative. Discharged via wheelchair, hospital escort: Refused.  Special equipment needed: Not Applicable    Be sure to schedule a follow-up appointment with your primary care doctor or any specialists as instructed.     Discharge Plan:   Diet Plan: Discussed  Activity Level: Discussed  Confirmed Follow up Appointment: Patient to Call and Schedule Appointment  Confirmed Symptoms Management: Discussed  Medication Reconciliation Updated: Yes  Influenza Vaccine Indication: Indicated: Not available from distributor/    I understand that a diet low in cholesterol, fat, and sodium is recommended for good health. Unless I have been given specific instructions below for another diet, I accept this instruction as my diet prescription.   Other diet: Heart-Healthy Eating Plan  Many factors influence your heart health, including eating and exercise habits. Heart (coronary) risk increases with abnormal blood fat (lipid) levels. Heart-healthy meal planning includes limiting unhealthy fats, increasing healthy fats, and making other small dietary changes. This includes maintaining a healthy body weight to help keep lipid levels within a normal range.  WHAT IS MY PLAN?   Your health care provider recommends that you:  · Get no more than _________% of the total calories in your daily diet from fat.  · Limit your intake of saturated fat to less than _________% of your total calories each day.  · Limit the amount of cholesterol in your diet to less than _________ mg per day.  WHAT TYPES OF FAT SHOULD I CHOOSE?  · Choose healthy fats more often. Choose monounsaturated and polyunsaturated fats, such as olive oil and canola oil, flaxseeds, walnuts, almonds, and seeds.  · Eat more omega-3 fats. Good choices include salmon, mackerel, sardines, tuna, flaxseed oil, and ground flaxseeds. Aim to eat fish at least two times each week.  · Limit saturated fats. Saturated fats are  "primarily found in animal products, such as meats, butter, and cream. Plant sources of saturated fats include palm oil, palm kernel oil, and coconut oil.  · Avoid foods with partially hydrogenated oils in them. These contain trans fats. Examples of foods that contain trans fats are stick margarine, some tub margarines, cookies, crackers, and other baked goods.  WHAT GENERAL GUIDELINES DO I NEED TO FOLLOW?  · Check food labels carefully to identify foods with trans fats or high amounts of saturated fat.  · Fill one half of your plate with vegetables and green salads. Eat 4-5 servings of vegetables per day. A serving of vegetables equals 1 cup of raw leafy vegetables, ½ cup of raw or cooked cut-up vegetables, or ½ cup of vegetable juice.  · Fill one fourth of your plate with whole grains. Look for the word \"whole\" as the first word in the ingredient list.  · Fill one fourth of your plate with lean protein foods.  · Eat 4-5 servings of fruit per day. A serving of fruit equals one medium whole fruit, ¼ cup of dried fruit, ½ cup of fresh, frozen, or canned fruit, or ½ cup of 100% fruit juice.  · Eat more foods that contain soluble fiber. Examples of foods that contain this type of fiber are apples, broccoli, carrots, beans, peas, and barley. Aim to get 20-30 g of fiber per day.  · Eat more home-cooked food and less restaurant, buffet, and fast food.  · Limit or avoid alcohol.  · Limit foods that are high in starch and sugar.  · Avoid fried foods.  · Cook foods by using methods other than frying. Baking, boiling, grilling, and broiling are all great options. Other fat-reducing suggestions include:  · Removing the skin from poultry.  · Removing all visible fats from meats.  · Skimming the fat off of stews, soups, and gravies before serving them.  · Steaming vegetables in water or broth.  · Lose weight if you are overweight. Losing just 5-10% of your initial body weight can help your overall health and prevent diseases such " as diabetes and heart disease.  · Increase your consumption of nuts, legumes, and seeds to 4-5 servings per week. One serving of dried beans or legumes equals ½ cup after being cooked, one serving of nuts equals 1½ ounces, and one serving of seeds equals ½ ounce or 1 tablespoon.  · You may need to monitor your salt (sodium) intake, especially if you have high blood pressure. Talk with your health care provider or dietitian to get more information about reducing sodium.  WHAT FOODS CAN I EAT?  Grains  Breads, including Yi, white, judith, wheat, raisin, rye, oatmeal, and Italian. Tortillas that are neither fried nor made with lard or trans fat. Low-fat rolls, including hotdog and hamburger buns and English muffins. Biscuits. Muffins. Waffles. Pancakes. Light popcorn. Whole-grain cereals. Flatbread. Cristal toast. Pretzels. Breadsticks. Rusks. Low-fat snacks and crackers, including oyster, saltine, matzo, eulalio, animal, and rye. Rice and pasta, including brown rice and those that are made with whole wheat.  Vegetables  All vegetables.  Fruits  All fruits, but limit coconut.  Meats and Other Protein Sources  Lean, well-trimmed beef, veal, pork, and lamb. Chicken and turkey without skin. All fish and shellfish. Wild duck, rabbit, pheasant, and venison. Egg whites or low-cholesterol egg substitutes. Dried beans, peas, lentils, and tofu. Seeds and most nuts.  Dairy  Low-fat or nonfat cheeses, including ricotta, string, and mozzarella. Skim or 1% milk that is liquid, powdered, or evaporated. Buttermilk that is made with low-fat milk. Nonfat or low-fat yogurt.  Beverages  Mineral water. Diet carbonated beverages.  Sweets and Desserts  Sherbets and fruit ices. Honey, jam, marmalade, jelly, and syrups. Meringues and gelatins. Pure sugar candy, such as hard candy, jelly beans, gumdrops, mints, marshmallows, and small amounts of dark chocolate. Esau food cake.  Eat all sweets and desserts in moderation.  Fats and  Oils  Nonhydrogenated (trans-free) margarines. Vegetable oils, including soybean, sesame, sunflower, olive, peanut, safflower, corn, canola, and cottonseed. Salad dressings or mayonnaise that are made with a vegetable oil. Limit added fats and oils that you use for cooking, baking, salads, and as spreads.  Other  Cocoa powder. Coffee and tea. All seasonings and condiments.  The items listed above may not be a complete list of recommended foods or beverages. Contact your dietitian for more options.  WHAT FOODS ARE NOT RECOMMENDED?  Grains  Breads that are made with saturated or trans fats, oils, or whole milk. Croissants. Butter rolls. Cheese breads. Sweet rolls. Donuts. Buttered popcorn. Chow mein noodles. High-fat crackers, such as cheese or butter crackers.  Meats and Other Protein Sources  Fatty meats, such as hotdogs, short ribs, sausage, spareribs, lee, ribeye roast or steak, and mutton. High-fat deli meats, such as salami and bologna. Caviar. Domestic duck and goose. Organ meats, such as kidney, liver, sweetbreads, brains, gizzard, chitterlings, and heart.  Dairy  Cream, sour cream, cream cheese, and creamed cottage cheese. Whole milk cheeses, including blue (bernadette), Millard [place], Brie, Ruben, American, Havarti, Swiss, cheddar, Camembert, and [place].  Whole or 2% milk that is liquid, evaporated, or condensed. Whole buttermilk. Cream sauce or high-fat cheese sauce. Yogurt that is made from whole milk.  Beverages  Regular sodas and drinks with added sugar.  Sweets and Desserts  Frosting. Pudding. Cookies. Cakes other than carlos food cake. Candy that has milk chocolate or white chocolate, hydrogenated fat, butter, coconut, or unknown ingredients. Buttered syrups. Full-fat ice cream or ice cream drinks.  Fats and Oils  Gravy that has suet, meat fat, or shortening. Cocoa butter, hydrogenated oils, palm oil, coconut oil, palm kernel oil. These can often be found in baked products, candy, fried foods, nondairy  creamers, and whipped toppings. Solid fats and shortenings, including lee fat, salt pork, lard, and butter. Nondairy cream substitutes, such as coffee creamers and sour cream substitutes. Salad dressings that are made of unknown oils, cheese, or sour cream.  The items listed above may not be a complete list of foods and beverages to avoid. Contact your dietitian for more information.     This information is not intended to replace advice given to you by your health care provider. Make sure you discuss any questions you have with your health care provider.     Document Released: 09/26/2009 Document Revised: 01/08/2016 Document Reviewed: 06/11/2015  [employer] Interactive Patient Education ©2016 [employer]      Special Instructions:   Follow up in AA meetings     Make an appointment with your primary care provider and get repeat lab work in 1-2 months        Alcohol Withdrawal  Alcohol withdrawal is a group of symptoms that can develop when a person who drinks heavily and regularly stops drinking or drinks less.  CAUSES  Heavy and regular drinking can cause chemicals that send signals from the brain to the body (neurotransmitters) to deactivate. Alcohol withdrawal develops when deactivated neurotransmitters reactivate because a person stops drinking or drinks less.  RISK FACTORS  The more a person drinks and the longer he or she drinks, the greater the risk of alcohol withdrawal. Severe withdrawal is more likely to develop in someone who:  · Had severe alcohol withdrawal in the past.  · Had a seizure during a previous episode of alcohol withdrawal.  · Is elderly.  · Is pregnant.  · Has been abusing drugs.  · Has other medical problems, including:  ¨ Infection.  ¨ Heart, lung, or liver disease.  ¨ Seizures.  ¨ Mental health problems.  SYMPTOMS  Symptoms of this condition can be mild to moderate, or they can be severe.  Mild to moderate symptoms may include:  · Fatigue.  · Nightmares.  · Trouble  sleeping.  · Depression.  · Anxiety.  · Inability to think clearly.  · Mood swings.  · Irritability.  · Loss of appetite.  · Nausea or vomiting.  · Clammy skin.  · Extreme sweating.  · Rapid heartbeat.  · Shakiness.  · Uncontrollable shaking (tremor).  Severe symptoms may include:  · Fever.  · Seizures.  · Severe confusion.  · Feeling or seeing things that are not there (hallucinations).  Symptoms usually begin within eight hours after a person stops drinking or drinks less. They can last for weeks.  DIAGNOSIS  Alcohol withdrawal is diagnosed with a medical history and physical exam. Sometimes, urine and blood tests are also done.  TREATMENT  Treatment may involve:  · Monitoring blood pressure, pulse, and breathing.  · Getting fluids through an IV tube.  · Medicine to reduce anxiety.  · Medicine to prevent or control seizures.  · Multivitamins and B vitamins.  · Having a health care provider check on you daily.  If symptoms are moderate to severe or if there is a risk of severe withdrawal, treatment may be done at a hospital or treatment center.  HOME CARE INSTRUCTIONS  · Take medicines and vitamin supplements only as directed by your health care provider.  · Do not drink alcohol.  · Have someone stay with you or be available if you need help.  · Drink enough fluid to keep your urine clear or pale yellow.  · Consider joining a 12-step program or another alcohol support group.  SEEK MEDICAL CARE IF:  · Your symptoms get worse or do not go away.  · You cannot keep food or water in your stomach.  · You are struggling with not drinking alcohol.  · You cannot stop drinking alcohol.  SEEK IMMEDIATE MEDICAL CARE IF:   · You have an irregular heartbeat.  · You have chest pain.  · You have trouble breathing.  · You have symptoms of severe withdrawal, such as:  ¨ A fever.  ¨ Seizures.  ¨ Severe confusion.  ¨ Hallucinations.     This information is not intended to replace advice given to you by your health care provider. Make  sure you discuss any questions you have with your health care provider.     Document Released: 09/27/2006 Document Revised: 01/08/2016 Document Reviewed: 10/06/2015  [employer] Interactive Patient Education ©2016 [employer]    · Is patient discharged on Warfarin / Coumadin?   No     · Is patient Post Blood Transfusion?  No    Depression / Suicide Risk    As you are discharged from this [facility] facility, it is important to learn how to keep safe from harming yourself.    Recognize the warning signs:  · Abrupt changes in personality, positive or negative- including increase in energy   · Giving away possessions  · Change in eating patterns- significant weight changes-  positive or negative  · Change in sleeping patterns- unable to sleep or sleeping all the time   · Unwillingness or inability to communicate  · Depression  · Unusual sadness, discouragement and loneliness  · Talk of wanting to die  · Neglect of personal appearance   · Rebelliousness- reckless behavior  · Withdrawal from people/activities they love  · Confusion- inability to concentrate     If you or a loved one observes any of these behaviors or has concerns about self-harm, here's what you can do:  · Talk about it- your feelings and reasons for harming yourself  · Remove any means that you might use to hurt yourself (examples: pills, rope, extension cords, firearm)  · Get professional help from the community (Mental Health, Substance Abuse, psychological counseling)  · Do not be alone:Call your Safe Contact- someone whom you trust who will be there for you.  · Call your local CRISIS HOTLINE 711-6047 or 954-101-6510  · Call your local Children's Mobile Crisis Response Team [place] (610) 882-0227 or www.[employer]  · Call the toll free National Suicide Prevention Hotlines   · National Suicide Prevention Lifeline 506-215-GONJ (8636)  · National Hope Line Network 800-SUICIDE (351-9964)

## 2017-06-23 RX ORDER — LORAZEPAM 0.5 MG/1
0.5 TABLET ORAL EVERY 4 HOURS PRN
Qty: 10 TAB | Refills: 0 | Status: SHIPPED | OUTPATIENT
Start: 2017-06-23 | End: 2018-07-25

## 2017-06-23 NOTE — PROGRESS NOTES
Discharge instructions given and discussed. Pt verbalized understanding. Paper prescriptions given. Pt discharged home in stable condition. VSS, IV removed and tolerated well. Tele box removed, monitor room notified.

## 2017-06-23 NOTE — PROGRESS NOTES
Bedside report received, Pt care assumed. Tele box on. VSS, Pt assessment complete. Pt AOX4, no signs of distress noted at this time.  Pt c/o of 0/10 pain. Pt denies any additional needs at this time. Bed in lowest position, bed alarm is on, ambulates with no assistance. POC discussed with Pt/family, verbalizes understanding, no questions at this time. Pt educated on fall risk and verbalizes understanding, call light within reach, will continue to monitor.

## 2017-06-23 NOTE — DISCHARGE SUMMARY
DISCHARGE DIAGNOSES:  1.  Alcohol withdrawal.  2.  Alcohol abuse.  3.  Alcohol withdrawal related seizure.  4.  Alcoholic hepatitis.  5.  Macrocytic anemia secondary to alcohol abuse.  6.  Thrombocytopenia secondary to alcohol abuse.  7.  Pancytopenia secondary to alcohol abuse.    MEDICATIONS AT DISCHARGE:  Gabapentin 100 mg at bedtime, lorazepam 0.5 mg   every 4 hours as needed for anxiety, Flexeril 10 mg 3 times daily as needed   for muscle spasms.    CONSULTATIONS:  None.    PROCEDURES:  None.    HOSPITAL COURSE:  The patient is a 51-year-old woman with a prior history of   alcohol abuse who actually quit drinking at the age of 23 and then about 5   years ago after her children grown and left home, she started drinking again.    She says that she has been drinking very heavily.  Her last drink was about 3   days ago.  She had a seizure at home and was brought here for further   evaluation.  She has multiple lab abnormalities including evidence of mild to   moderate alcoholic hepatitis and pancytopenia.  She is really doing well and   is not showing signs of worsening alcohol withdrawal.  Her CIWA score has   remained about 2 or 3.  She desires to be discharged home and made an   uncharacteristically rapid recovery.  MRI of the brain was normal and   ultrasound of the abdomen was done at an outside facility and did not show any   changes other than fatty changes which would be expected.  I had a long   discussion with the patient prior to discharge that she is very close to being   cirrhotic if not already starting and foresee other changes.  She does wish   to quit drinking.  She has an Alcoholics Anonymous meeting to go to tonight.    Her  has been sober for over 30 years and is going to attend meetings   with her as well.    DISPOSITION:  Home.    CONDITION:  Good.    FOLLOWUP:  Followup is to be with her primary care provider and alcoholic   synonymous.    DIET:  Regular diet.  She is going to take  over-the-counter multivitamin as   needed.    ACTIVITY:  As tolerated.    INSTRUCTIONS:  She is not to drive until she follows up with her primary care   provider.  This is a probably single incident of seizure.    Total time spent preparing her for discharge 35 minutes.       ____________________________________     MD AMANDA PEREZ / GER[place]    DD:  06/22/2017 14:40:30  DT:  06/22/2017 22:34:35    D#:  [id number]  Job#:  [id number]

## 2017-06-28 NOTE — ADDENDUM NOTE
Encounter addended by: Malcolm Aguirre M.D. on: 6/28/2017 11:23 AM<BR>     Documentation filed: Notes Section
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
## 2017-11-12 ENCOUNTER — RESOLUTE PROFESSIONAL BILLING HOSPITAL PROF FEE (OUTPATIENT)
Dept: HOSPITALIST | Facility: MEDICAL CENTER | Age: 52
End: 2017-11-12
Payer: COMMERCIAL

## 2017-11-12 ENCOUNTER — HOSPITAL ENCOUNTER (INPATIENT)
Facility: MEDICAL CENTER | Age: 52
LOS: 2 days | DRG: 897 | End: 2017-11-14
Attending: EMERGENCY MEDICINE | Admitting: HOSPITALIST
Payer: COMMERCIAL

## 2017-11-12 DIAGNOSIS — F10.939 ALCOHOL WITHDRAWAL SYNDROME WITH COMPLICATION (HCC): ICD-10-CM

## 2017-11-12 DIAGNOSIS — R17 JAUNDICE: ICD-10-CM

## 2017-11-12 PROBLEM — E87.6 HYPOKALEMIA: Status: ACTIVE | Noted: 2017-11-12

## 2017-11-12 PROBLEM — D61.818 PANCYTOPENIA (HCC): Status: ACTIVE | Noted: 2017-11-12

## 2017-11-12 PROBLEM — E80.6 HYPERBILIRUBINEMIA: Status: ACTIVE | Noted: 2017-11-12

## 2017-11-12 LAB
ALBUMIN SERPL BCP-MCNC: 4.6 G/DL (ref 3.2–4.9)
ALBUMIN/GLOB SERPL: 1.6 G/DL
ALP SERPL-CCNC: 210 U/L (ref 30–99)
ALT SERPL-CCNC: 36 U/L (ref 2–50)
ANION GAP SERPL CALC-SCNC: 14 MMOL/L (ref 0–11.9)
ANISOCYTOSIS BLD QL SMEAR: ABNORMAL
AST SERPL-CCNC: 108 U/L (ref 12–45)
BASOPHILS # BLD AUTO: 0 % (ref 0–1.8)
BASOPHILS # BLD: 0 K/UL (ref 0–0.12)
BILIRUB SERPL-MCNC: 4.6 MG/DL (ref 0.1–1.5)
BUN SERPL-MCNC: 15 MG/DL (ref 8–22)
CALCIUM SERPL-MCNC: 10.3 MG/DL (ref 8.5–10.5)
CHLORIDE SERPL-SCNC: 98 MMOL/L (ref 96–112)
CO2 SERPL-SCNC: 23 MMOL/L (ref 20–33)
CREAT SERPL-MCNC: 0.7 MG/DL (ref 0.5–1.4)
DACRYOCYTES BLD QL SMEAR: NORMAL
EOSINOPHIL # BLD AUTO: 0 K/UL (ref 0–0.51)
EOSINOPHIL NFR BLD: 0 % (ref 0–6.9)
ERYTHROCYTE [DISTWIDTH] IN BLOOD BY AUTOMATED COUNT: 48.1 FL (ref 35.9–50)
GFR SERPL CREATININE-BSD FRML MDRD: >60 ML/MIN/1.73 M 2
GIANT PLATELETS BLD QL SMEAR: NORMAL
GLOBULIN SER CALC-MCNC: 2.9 G/DL (ref 1.9–3.5)
GLUCOSE SERPL-MCNC: 176 MG/DL (ref 65–99)
HCT VFR BLD AUTO: 32 % (ref 37–47)
HGB BLD-MCNC: 10.3 G/DL (ref 12–16)
LIPASE SERPL-CCNC: 33 U/L (ref 11–82)
LYMPHOCYTES # BLD AUTO: 0.36 K/UL (ref 1–4.8)
LYMPHOCYTES NFR BLD: 24.1 % (ref 22–41)
MACROCYTES BLD QL SMEAR: ABNORMAL
MANUAL DIFF BLD: NORMAL
MCH RBC QN AUTO: 31.4 PG (ref 27–33)
MCHC RBC AUTO-ENTMCNC: 32.2 G/DL (ref 33.6–35)
MCV RBC AUTO: 97.6 FL (ref 81.4–97.8)
MONOCYTES # BLD AUTO: 0.13 K/UL (ref 0–0.85)
MONOCYTES NFR BLD AUTO: 8.9 % (ref 0–13.4)
MORPHOLOGY BLD-IMP: NORMAL
MYELOCYTES NFR BLD MANUAL: 0.9 %
NEUTROPHILS # BLD AUTO: 0.99 K/UL (ref 2–7.15)
NEUTROPHILS NFR BLD: 66.1 % (ref 44–72)
NRBC # BLD AUTO: 0 K/UL
NRBC BLD AUTO-RTO: 0 /100 WBC
OVALOCYTES BLD QL SMEAR: NORMAL
PLATELET # BLD AUTO: 44 K/UL (ref 164–446)
PLATELET BLD QL SMEAR: NORMAL
PLATELETS.RETICULATED NFR BLD AUTO: 5.7 K/UL (ref 0.6–13.1)
PMV BLD AUTO: 9.7 FL (ref 9–12.9)
POIKILOCYTOSIS BLD QL SMEAR: NORMAL
POTASSIUM SERPL-SCNC: 3.2 MMOL/L (ref 3.6–5.5)
PROT SERPL-MCNC: 7.5 G/DL (ref 6–8.2)
RBC # BLD AUTO: 3.28 M/UL (ref 4.2–5.4)
RBC BLD AUTO: PRESENT
SMUDGE CELLS BLD QL SMEAR: NORMAL
SODIUM SERPL-SCNC: 135 MMOL/L (ref 135–145)
VARIANT LYMPHS BLD QL SMEAR: NORMAL
WBC # BLD AUTO: 1.5 K/UL (ref 4.8–10.8)

## 2017-11-12 PROCEDURE — 96365 THER/PROPH/DIAG IV INF INIT: CPT

## 2017-11-12 PROCEDURE — 700102 HCHG RX REV CODE 250 W/ 637 OVERRIDE(OP): Performed by: HOSPITALIST

## 2017-11-12 PROCEDURE — 770020 HCHG ROOM/CARE - TELE (206)

## 2017-11-12 PROCEDURE — 80053 COMPREHEN METABOLIC PANEL: CPT

## 2017-11-12 PROCEDURE — 700105 HCHG RX REV CODE 258: Performed by: HOSPITALIST

## 2017-11-12 PROCEDURE — 96375 TX/PRO/DX INJ NEW DRUG ADDON: CPT

## 2017-11-12 PROCEDURE — 700101 HCHG RX REV CODE 250: Performed by: EMERGENCY MEDICINE

## 2017-11-12 PROCEDURE — 85027 COMPLETE CBC AUTOMATED: CPT

## 2017-11-12 PROCEDURE — 700101 HCHG RX REV CODE 250: Performed by: HOSPITALIST

## 2017-11-12 PROCEDURE — 85055 RETICULATED PLATELET ASSAY: CPT

## 2017-11-12 PROCEDURE — A9270 NON-COVERED ITEM OR SERVICE: HCPCS | Performed by: HOSPITALIST

## 2017-11-12 PROCEDURE — HZ2ZZZZ DETOXIFICATION SERVICES FOR SUBSTANCE ABUSE TREATMENT: ICD-10-PCS | Performed by: HOSPITALIST

## 2017-11-12 PROCEDURE — 85007 BL SMEAR W/DIFF WBC COUNT: CPT

## 2017-11-12 PROCEDURE — 700105 HCHG RX REV CODE 258: Performed by: EMERGENCY MEDICINE

## 2017-11-12 PROCEDURE — 99285 EMERGENCY DEPT VISIT HI MDM: CPT

## 2017-11-12 PROCEDURE — 700111 HCHG RX REV CODE 636 W/ 250 OVERRIDE (IP): Performed by: EMERGENCY MEDICINE

## 2017-11-12 PROCEDURE — 83690 ASSAY OF LIPASE: CPT

## 2017-11-12 PROCEDURE — 700111 HCHG RX REV CODE 636 W/ 250 OVERRIDE (IP): Performed by: HOSPITALIST

## 2017-11-12 RX ORDER — FOLIC ACID 1 MG/1
1 TABLET ORAL DAILY
Status: DISCONTINUED | OUTPATIENT
Start: 2017-11-12 | End: 2017-11-12

## 2017-11-12 RX ORDER — BISACODYL 10 MG
10 SUPPOSITORY, RECTAL RECTAL
Status: DISCONTINUED | OUTPATIENT
Start: 2017-11-12 | End: 2017-11-14 | Stop reason: HOSPADM

## 2017-11-12 RX ORDER — THIAMINE MONONITRATE (VIT B1) 100 MG
100 TABLET ORAL DAILY
Status: DISCONTINUED | OUTPATIENT
Start: 2017-11-12 | End: 2017-11-12

## 2017-11-12 RX ORDER — LORAZEPAM 2 MG/ML
1 INJECTION INTRAMUSCULAR
Status: DISCONTINUED | OUTPATIENT
Start: 2017-11-12 | End: 2017-11-14 | Stop reason: HOSPADM

## 2017-11-12 RX ORDER — LORAZEPAM 1 MG/1
0.5 TABLET ORAL EVERY 4 HOURS PRN
Status: DISCONTINUED | OUTPATIENT
Start: 2017-11-12 | End: 2017-11-14 | Stop reason: HOSPADM

## 2017-11-12 RX ORDER — LORAZEPAM 2 MG/ML
0.5 INJECTION INTRAMUSCULAR EVERY 4 HOURS PRN
Status: DISCONTINUED | OUTPATIENT
Start: 2017-11-12 | End: 2017-11-14 | Stop reason: HOSPADM

## 2017-11-12 RX ORDER — ONDANSETRON 2 MG/ML
4 INJECTION INTRAMUSCULAR; INTRAVENOUS ONCE
Status: COMPLETED | OUTPATIENT
Start: 2017-11-12 | End: 2017-11-12

## 2017-11-12 RX ORDER — POLYETHYLENE GLYCOL 3350 17 G/17G
1 POWDER, FOR SOLUTION ORAL
Status: DISCONTINUED | OUTPATIENT
Start: 2017-11-12 | End: 2017-11-14 | Stop reason: HOSPADM

## 2017-11-12 RX ORDER — SODIUM CHLORIDE 9 MG/ML
1000 INJECTION, SOLUTION INTRAVENOUS ONCE
Status: COMPLETED | OUTPATIENT
Start: 2017-11-12 | End: 2017-11-12

## 2017-11-12 RX ORDER — THIAMINE MONONITRATE (VIT B1) 100 MG
100 TABLET ORAL DAILY
Status: DISCONTINUED | OUTPATIENT
Start: 2017-11-13 | End: 2017-11-14 | Stop reason: HOSPADM

## 2017-11-12 RX ORDER — GABAPENTIN 100 MG/1
100 CAPSULE ORAL
Status: DISCONTINUED | OUTPATIENT
Start: 2017-11-12 | End: 2017-11-14 | Stop reason: HOSPADM

## 2017-11-12 RX ORDER — SODIUM CHLORIDE AND POTASSIUM CHLORIDE 150; 900 MG/100ML; MG/100ML
INJECTION, SOLUTION INTRAVENOUS CONTINUOUS
Status: DISCONTINUED | OUTPATIENT
Start: 2017-11-12 | End: 2017-11-14 | Stop reason: HOSPADM

## 2017-11-12 RX ORDER — LORAZEPAM 2 MG/ML
2 INJECTION INTRAMUSCULAR
Status: DISCONTINUED | OUTPATIENT
Start: 2017-11-12 | End: 2017-11-14 | Stop reason: HOSPADM

## 2017-11-12 RX ORDER — FOLIC ACID 1 MG/1
1 TABLET ORAL DAILY
Status: DISCONTINUED | OUTPATIENT
Start: 2017-11-13 | End: 2017-11-14 | Stop reason: HOSPADM

## 2017-11-12 RX ORDER — ACETAMINOPHEN 325 MG/1
650 TABLET ORAL EVERY 6 HOURS PRN
Status: DISCONTINUED | OUTPATIENT
Start: 2017-11-12 | End: 2017-11-14 | Stop reason: HOSPADM

## 2017-11-12 RX ORDER — CHLORDIAZEPOXIDE HYDROCHLORIDE 25 MG/1
25 CAPSULE, GELATIN COATED ORAL EVERY 6 HOURS
Status: DISCONTINUED | OUTPATIENT
Start: 2017-11-13 | End: 2017-11-14 | Stop reason: HOSPADM

## 2017-11-12 RX ORDER — LORAZEPAM 2 MG/ML
2 INJECTION INTRAMUSCULAR ONCE
Status: COMPLETED | OUTPATIENT
Start: 2017-11-12 | End: 2017-11-12

## 2017-11-12 RX ORDER — LORAZEPAM 1 MG/1
2 TABLET ORAL
Status: DISCONTINUED | OUTPATIENT
Start: 2017-11-12 | End: 2017-11-14 | Stop reason: HOSPADM

## 2017-11-12 RX ORDER — LORAZEPAM 1 MG/1
1 TABLET ORAL EVERY 4 HOURS PRN
Status: DISCONTINUED | OUTPATIENT
Start: 2017-11-12 | End: 2017-11-14 | Stop reason: HOSPADM

## 2017-11-12 RX ORDER — AMOXICILLIN 250 MG
2 CAPSULE ORAL 2 TIMES DAILY
Status: DISCONTINUED | OUTPATIENT
Start: 2017-11-12 | End: 2017-11-14 | Stop reason: HOSPADM

## 2017-11-12 RX ORDER — LORAZEPAM 1 MG/1
3 TABLET ORAL
Status: DISCONTINUED | OUTPATIENT
Start: 2017-11-12 | End: 2017-11-14 | Stop reason: HOSPADM

## 2017-11-12 RX ORDER — LORAZEPAM 2 MG/ML
1.5 INJECTION INTRAMUSCULAR
Status: DISCONTINUED | OUTPATIENT
Start: 2017-11-12 | End: 2017-11-14 | Stop reason: HOSPADM

## 2017-11-12 RX ORDER — LORAZEPAM 1 MG/1
4 TABLET ORAL
Status: DISCONTINUED | OUTPATIENT
Start: 2017-11-12 | End: 2017-11-14 | Stop reason: HOSPADM

## 2017-11-12 RX ORDER — CHLORDIAZEPOXIDE HYDROCHLORIDE 25 MG/1
50 CAPSULE, GELATIN COATED ORAL EVERY 6 HOURS
Status: COMPLETED | OUTPATIENT
Start: 2017-11-12 | End: 2017-11-13

## 2017-11-12 RX ADMIN — POTASSIUM CHLORIDE: 2 INJECTION, SOLUTION, CONCENTRATE INTRAVENOUS at 20:26

## 2017-11-12 RX ADMIN — ONDANSETRON 4 MG: 2 INJECTION INTRAMUSCULAR; INTRAVENOUS at 15:47

## 2017-11-12 RX ADMIN — POTASSIUM CHLORIDE AND SODIUM CHLORIDE: 900; 150 INJECTION, SOLUTION INTRAVENOUS at 20:15

## 2017-11-12 RX ADMIN — LORAZEPAM 2 MG: 2 INJECTION INTRAMUSCULAR; INTRAVENOUS at 15:47

## 2017-11-12 RX ADMIN — GABAPENTIN 100 MG: 100 CAPSULE ORAL at 20:14

## 2017-11-12 RX ADMIN — LORAZEPAM 1 MG: 2 INJECTION INTRAMUSCULAR; INTRAVENOUS at 20:13

## 2017-11-12 RX ADMIN — LORAZEPAM 1 MG: 1 TABLET ORAL at 22:52

## 2017-11-12 RX ADMIN — SODIUM CHLORIDE 1000 ML: 9 INJECTION, SOLUTION INTRAVENOUS at 16:39

## 2017-11-12 RX ADMIN — MAGNESIUM GLUCONATE 500 MG ORAL TABLET 400 MG: 500 TABLET ORAL at 20:14

## 2017-11-12 RX ADMIN — CHLORDIAZEPOXIDE HYDROCHLORIDE 50 MG: 25 CAPSULE ORAL at 20:14

## 2017-11-12 RX ADMIN — THIAMINE HYDROCHLORIDE 1000 ML: 100 INJECTION, SOLUTION INTRAMUSCULAR; INTRAVENOUS at 15:47

## 2017-11-12 ASSESSMENT — ENCOUNTER SYMPTOMS
COUGH: 0
INSOMNIA: 1
SHORTNESS OF BREATH: 0
NERVOUS/ANXIOUS: 1

## 2017-11-12 ASSESSMENT — LIFESTYLE VARIABLES
NAUSEA AND VOMITING: *
VISUAL DISTURBANCES: NOT PRESENT
TOTAL SCORE: 4
AGITATION: MODERATELY FIDGETY AND RESTLESS
PAROXYSMAL SWEATS: NO SWEAT VISIBLE
HEADACHE, FULLNESS IN HEAD: NOT PRESENT
EVER HAD A DRINK FIRST THING IN THE MORNING TO STEADY YOUR NERVES TO GET RID OF A HANGOVER: YES
TOTAL SCORE: 8
AVERAGE NUMBER OF DAYS PER WEEK YOU HAVE A DRINK CONTAINING ALCOHOL: 7
HOW MANY TIMES IN THE PAST YEAR HAVE YOU HAD 5 OR MORE DRINKS IN A DAY: 20
ANXIETY: MODERATELY ANXIOUS OR GUARDED, SO ANXIETY IS INFERRED
DOES PATIENT WANT TO STOP DRINKING: YES
TREMOR: MODERATE TREMOR WITH ARMS EXTENDED
TREMOR: MODERATE TREMOR WITH ARMS EXTENDED
ANXIETY: *
NAUSEA AND VOMITING: NO NAUSEA AND NO VOMITING
CONSUMPTION TOTAL: POSITIVE
TOTAL SCORE: 13
HAVE PEOPLE ANNOYED YOU BY CRITICIZING YOUR DRINKING: YES
TREMOR: *
AGITATION: SOMEWHAT MORE THAN NORMAL ACTIVITY
AGITATION: SOMEWHAT MORE THAN NORMAL ACTIVITY
AUDITORY DISTURBANCES: NOT PRESENT
HAVE YOU EVER FELT YOU SHOULD CUT DOWN ON YOUR DRINKING: YES
ANXIETY: *
PAROXYSMAL SWEATS: NO SWEAT VISIBLE
HEADACHE, FULLNESS IN HEAD: NOT PRESENT
TOTAL SCORE: 4
TREMOR: *
HEADACHE, FULLNESS IN HEAD: MILD
HEADACHE, FULLNESS IN HEAD: NOT PRESENT
DOES PATIENT WANT TO TALK TO SOMEONE ABOUT QUITTING: NO
ORIENTATION AND CLOUDING OF SENSORIUM: ORIENTED AND CAN DO SERIAL ADDITIONS
ORIENTATION AND CLOUDING OF SENSORIUM: ORIENTED AND CAN DO SERIAL ADDITIONS
ON A TYPICAL DAY WHEN YOU DRINK ALCOHOL HOW MANY DRINKS DO YOU HAVE: 3
TOTAL SCORE: 14
AUDITORY DISTURBANCES: NOT PRESENT
VISUAL DISTURBANCES: NOT PRESENT
PAROXYSMAL SWEATS: NO SWEAT VISIBLE
VISUAL DISTURBANCES: NOT PRESENT
AUDITORY DISTURBANCES: NOT PRESENT
AUDITORY DISTURBANCES: NOT PRESENT
DO YOU DRINK ALCOHOL: YES
VISUAL DISTURBANCES: NOT PRESENT
NAUSEA AND VOMITING: NO NAUSEA AND NO VOMITING
TOTAL SCORE: 7
NAUSEA AND VOMITING: NO NAUSEA AND NO VOMITING
ORIENTATION AND CLOUDING OF SENSORIUM: ORIENTED AND CAN DO SERIAL ADDITIONS
TOTAL SCORE: 4
PAROXYSMAL SWEATS: NO SWEAT VISIBLE
ORIENTATION AND CLOUDING OF SENSORIUM: ORIENTED AND CAN DO SERIAL ADDITIONS
ANXIETY: *
EVER FELT BAD OR GUILTY ABOUT YOUR DRINKING: YES
AGITATION: *

## 2017-11-12 ASSESSMENT — COGNITIVE AND FUNCTIONAL STATUS - GENERAL
SUGGESTED CMS G CODE MODIFIER DAILY ACTIVITY: CH
DAILY ACTIVITIY SCORE: 24
SUGGESTED CMS G CODE MODIFIER MOBILITY: CH
MOBILITY SCORE: 24

## 2017-11-12 ASSESSMENT — PATIENT HEALTH QUESTIONNAIRE - PHQ9
2. FEELING DOWN, DEPRESSED, IRRITABLE, OR HOPELESS: SEVERAL DAYS
3. TROUBLE FALLING OR STAYING ASLEEP OR SLEEPING TOO MUCH: SEVERAL DAYS
5. POOR APPETITE OR OVEREATING: NOT AT ALL
SUM OF ALL RESPONSES TO PHQ QUESTIONS 1-9: 3
1. LITTLE INTEREST OR PLEASURE IN DOING THINGS: SEVERAL DAYS
9. THOUGHTS THAT YOU WOULD BE BETTER OFF DEAD, OR OF HURTING YOURSELF: NOT AT ALL
7. TROUBLE CONCENTRATING ON THINGS, SUCH AS READING THE NEWSPAPER OR WATCHING TELEVISION: NOT AT ALL
SUM OF ALL RESPONSES TO PHQ9 QUESTIONS 1 AND 2: 2
8. MOVING OR SPEAKING SO SLOWLY THAT OTHER PEOPLE COULD HAVE NOTICED. OR THE OPPOSITE, BEING SO FIGETY OR RESTLESS THAT YOU HAVE BEEN MOVING AROUND A LOT MORE THAN USUAL: NOT AT ALL
4. FEELING TIRED OR HAVING LITTLE ENERGY: NOT AT ALL
6. FEELING BAD ABOUT YOURSELF - OR THAT YOU ARE A FAILURE OR HAVE LET YOURSELF OR YOUR FAMILY DOWN: NOT AL ALL

## 2017-11-12 ASSESSMENT — PAIN SCALES - GENERAL
PAINLEVEL_OUTOF10: 0
PAINLEVEL_OUTOF10: 7

## 2017-11-12 NOTE — ED PROVIDER NOTES
ED Provider Note    Scribed for Zulema Snow M.D. by Miranda Hugo. 11/12/2017, 3:22 PM.    Primary care provider: None  Means of arrival: Walk in  History obtained from: Patient  History limited by: None    CHIEF COMPLAINT  Chief Complaint   Patient presents with   • ETOH Withdrawal   • Seizure       HPI  Zari Day is a 52 y.o. female who presents to the Emergency Department for alcohol withdrawal and seizure with an onset of today.  History of alcohol abuse with drinking two pints of vodka a day.  The patient is attempting to stop drinking alcohol and is requesting assistance. She drank a pint of vodka yesterday. She began to feel tremulous and nauseous today.  She experienced a seizure for four minutes today which was witnessed by her . Last time she stopped drinking this happened as well. Which ended up making her start drinking again. She did have a period of 21 years where she was sober. Associated symptoms include loss of appetite and chills.  Negative for tongue laceration, fever, headache, vomiting or abdominal pain.      REVIEW OF SYSTEMS  Pertinent positives include alcohol abuse, alcohol withdrawal, tremors, nausea, seizure, loss of appetite and chills. Pertinent negatives include no fever, tongue laceration, headache, vomiting or abdominal pain.  All other systems reviewed and negative. C.      PAST MEDICAL HISTORY  Patient has a past medical history of ETOH abuse.      SURGICAL HISTORY  Patient denies any surgical history.      SOCIAL HISTORY  Social History   Substance Use Topics   • Smoking status: Never Smoker   • Smokeless tobacco: Never Used   • Alcohol use Yes      Comment: daily      History   Drug Use No       FAMILY HISTORY  Family History   Problem Relation Age of Onset   • Hypertension Mother    • Cancer Mother      breast   • Hypertension Father        CURRENT MEDICATIONS  Home Medications    **Home medications have not yet been reviewed for this encounter**    "      ALLERGIES  None      PHYSICAL EXAM  VITAL SIGNS: /96   Pulse (!) 129   Temp 36.9 °C (98.4 °F)   Resp 16   Ht 1.727 m (5' 8\")   Wt 74.8 kg (165 lb)   SpO2 98%   BMI 25.09 kg/m²       Constitutional: Alert. Anxious.Tremulous  HENT: No signs of trauma, Bilateral external ears normal, Nose normal.   Eyes: Pupils are equal and reactive, Conjunctiva normal, Positive icteric.   Neck: Normal range of motion, No tenderness, Supple, No stridor.   Cardiovascular: Regular rate and rhythm, no murmurs.   Thorax & Lungs: Normal breath sounds, No respiratory distress, No wheezing, No chest tenderness.   Abdomen: Bowel sounds normal, Soft, No tenderness, No masses, No peritoneal signs.  Skin: Slightly jaundice. Warm, Dry, No erythema, No rash. .  Musculoskeletal:  No major deformities noted. Good range of motion in all major joints.  Neurologic: Alert, Tremulous, moving all extremities without difficulty, no focal deficits.  Psychiatric: Anxious.       LABS  Results for orders placed or performed during the hospital encounter of 11/12/17   CBC WITH DIFFERENTIAL   Result Value Ref Range    WBC 1.5 (LL) 4.8 - 10.8 K/uL    RBC 3.28 (L) 4.20 - 5.40 M/uL    Hemoglobin 10.3 (L) 12.0 - 16.0 g/dL    Hematocrit 32.0 (L) 37.0 - 47.0 %    MCV 97.6 81.4 - 97.8 fL    MCH 31.4 27.0 - 33.0 pg    MCHC 32.2 (L) 33.6 - 35.0 g/dL    RDW 48.1 35.9 - 50.0 fL    Platelet Count 44 (LL) 164 - 446 K/uL    MPV 9.7 9.0 - 12.9 fL    Nucleated RBC 0.00 /100 WBC    NRBC (Absolute) 0.00 K/uL    Neutrophils-Polys 66.10 44.00 - 72.00 %    Lymphocytes 24.10 22.00 - 41.00 %    Monocytes 8.90 0.00 - 13.40 %    Eosinophils 0.00 0.00 - 6.90 %    Basophils 0.00 0.00 - 1.80 %    Neutrophils (Absolute) 0.99 (L) 2.00 - 7.15 K/uL    Lymphs (Absolute) 0.36 (L) 1.00 - 4.80 K/uL    Monos (Absolute) 0.13 0.00 - 0.85 K/uL    Eos (Absolute) 0.00 0.00 - 0.51 K/uL    Baso (Absolute) 0.00 0.00 - 0.12 K/uL    Anisocytosis 1+     Macrocytosis 1+    COMP METABOLIC " PANEL   Result Value Ref Range    Sodium 135 135 - 145 mmol/L    Potassium 3.2 (L) 3.6 - 5.5 mmol/L    Chloride 98 96 - 112 mmol/L    Co2 23 20 - 33 mmol/L    Anion Gap 14.0 (H) 0.0 - 11.9    Glucose 176 (H) 65 - 99 mg/dL    Bun 15 8 - 22 mg/dL    Creatinine 0.70 0.50 - 1.40 mg/dL    Calcium 10.3 8.5 - 10.5 mg/dL    AST(SGOT) 108 (H) 12 - 45 U/L    ALT(SGPT) 36 2 - 50 U/L    Alkaline Phosphatase 210 (H) 30 - 99 U/L    Total Bilirubin 4.6 (H) 0.1 - 1.5 mg/dL    Albumin 4.6 3.2 - 4.9 g/dL    Total Protein 7.5 6.0 - 8.2 g/dL    Globulin 2.9 1.9 - 3.5 g/dL    A-G Ratio 1.6 g/dL   LIPASE   Result Value Ref Range    Lipase 33 11 - 82 U/L   ESTIMATED GFR   Result Value Ref Range    GFR If African American >60 >60 mL/min/1.73 m 2    GFR If Non African American >60 >60 mL/min/1.73 m 2   DIFFERENTIAL MANUAL   Result Value Ref Range    Myelocytes 0.90 %    Manual Diff Status PERFORMED    PERIPHERAL SMEAR REVIEW   Result Value Ref Range    Peripheral Smear Review see below    PLATELET ESTIMATE   Result Value Ref Range    Plt Estimation Marked Decrease    MORPHOLOGY   Result Value Ref Range    RBC Morphology Present     Giant Platelets 1+     Poikilocytosis 1+     Ovalocytes 1+     Tear Drop Cells 1+     Smudge Cells Few     Reactive Lymphocytes Few    IMMATURE PLT FRACTION   Result Value Ref Range    Imm. Plt Fraction 5.7 0.6 - 13.1 K/uL      All labs reviewed by me.      COURSE & MEDICAL DECISION MAKING  Pertinent Labs & Imaging studies reviewed. (See chart for details)    Differential Diagnosis include but are not limited to: hepatitis, alcohol withdrawal seizures,Dehydration    3:20 PM Reviewed patient's electronic medical record which indicates an ED visit on 06/21/17 for seizure.    3:22 PM Patient seen and examined at bedside. Patient presents for alcohol withdrawal and seizure.  Exam indicates patient is anxious, tremulous and slightly jaundice.      Initial orders in the Emergency Department included laboratory testing:  estimated GFR, CBC with differential, CMP and lipase.  Initial treatment in the Emergency Department included 1 L of ER detox IV for alcohol withdrawal, 1 L of NS IV for hydration, 2 mg of Ativan IV for anxiety and 4 mg of Zofran IV nausea.  Patient verbalized their understanding and agreement to this plan.    5:22 PM Spoke with Dr. Matta, Hospitalist, regarding the patient's presenting symptoms, history of alcohol abuse and lab results. He will consult and admit the patient for further evaluation and care.        Decision Making:  This is a 52 y.o. year old female who presents with a seizure likely secondary to alcohol withdrawal. She has a significant alcohol history and history of alcohol withdrawal seizures. She is slightly jaundice. She did say that last time she detox this did improve. She is motivated to stop drinking has proven that she has been sober for quite some time in the past. I do think she is at risk of DTs and further withdrawal seizures without dimension. She was given Ativan and fluids and will be admitted to the hospitalist service for detox.        DISPOSITION  Patient will be admitted to Dr. Matta, Hospitalist, in stable condition.      DIAGNOSIS  1. Alcohol withdrawal syndrome with complication (CMS-HCC)    2. Jaundice           The note accurately reflects work and decisions made by me.  Zulema Snow  11/12/2017  7:29 PM     IMiranda (Scribe), am scribing for, and in the presence of, Zulema Snow M.D.    Electronically signed by: Miranda Hugo (Bhakti), 11/12/2017    Zulema FONTENOT M.D. personally performed the services described in this documentation, as scribed by Miranda Hugo in my presence, and it is both accurate and complete.      This dictation has been created using voice recognition software and/or scribes. The accuracy of the dictation is limited by the abilities of the software and the expertise of the scribes. I expect there may be some errors of grammar and  possibly content. I made every attempt to manually correct the errors within my dictation. However, errors related to voice recognition software and/or scribes may still exist and should be interpreted within the appropriate context.

## 2017-11-12 NOTE — ED NOTES
Taken to triage by wheelchair with   Chief Complaint   Patient presents with   • ETOH Withdrawal     Last drink last night, unknown time. States she wants to detox. 1/5 vodka per day. Tremulous.    • Seizure     4 minute seizure witnessed by      , anxious and restless.

## 2017-11-13 PROBLEM — R94.31 QT PROLONGATION: Status: ACTIVE | Noted: 2017-11-13

## 2017-11-13 PROBLEM — D53.9 MACROCYTIC ANEMIA: Status: ACTIVE | Noted: 2017-11-13

## 2017-11-13 LAB
ANION GAP SERPL CALC-SCNC: 6 MMOL/L (ref 0–11.9)
BASOPHILS # BLD AUTO: 1.1 % (ref 0–1.8)
BASOPHILS # BLD: 0.02 K/UL (ref 0–0.12)
BUN SERPL-MCNC: 11 MG/DL (ref 8–22)
CALCIUM SERPL-MCNC: 9.2 MG/DL (ref 8.5–10.5)
CHLORIDE SERPL-SCNC: 107 MMOL/L (ref 96–112)
CO2 SERPL-SCNC: 24 MMOL/L (ref 20–33)
CREAT SERPL-MCNC: 0.57 MG/DL (ref 0.5–1.4)
EKG IMPRESSION: NORMAL
EOSINOPHIL # BLD AUTO: 0.01 K/UL (ref 0–0.51)
EOSINOPHIL NFR BLD: 0.6 % (ref 0–6.9)
ERYTHROCYTE [DISTWIDTH] IN BLOOD BY AUTOMATED COUNT: 49.4 FL (ref 35.9–50)
GFR SERPL CREATININE-BSD FRML MDRD: >60 ML/MIN/1.73 M 2
GLUCOSE SERPL-MCNC: 103 MG/DL (ref 65–99)
HCT VFR BLD AUTO: 27.1 % (ref 37–47)
HGB BLD-MCNC: 8.7 G/DL (ref 12–16)
IMM GRANULOCYTES # BLD AUTO: 0.01 K/UL (ref 0–0.11)
IMM GRANULOCYTES NFR BLD AUTO: 0.6 % (ref 0–0.9)
LYMPHOCYTES # BLD AUTO: 0.61 K/UL (ref 1–4.8)
LYMPHOCYTES NFR BLD: 33.9 % (ref 22–41)
MAGNESIUM SERPL-MCNC: 2 MG/DL (ref 1.5–2.5)
MCH RBC QN AUTO: 31.9 PG (ref 27–33)
MCHC RBC AUTO-ENTMCNC: 32.1 G/DL (ref 33.6–35)
MCV RBC AUTO: 99.3 FL (ref 81.4–97.8)
MONOCYTES # BLD AUTO: 0.3 K/UL (ref 0–0.85)
MONOCYTES NFR BLD AUTO: 16.7 % (ref 0–13.4)
NEUTROPHILS # BLD AUTO: 0.85 K/UL (ref 2–7.15)
NEUTROPHILS NFR BLD: 47.1 % (ref 44–72)
NRBC # BLD AUTO: 0 K/UL
NRBC BLD AUTO-RTO: 0 /100 WBC
PHOSPHATE SERPL-MCNC: 1.4 MG/DL (ref 2.5–4.5)
PLATELET # BLD AUTO: 37 K/UL (ref 164–446)
PMV BLD AUTO: 9.6 FL (ref 9–12.9)
POTASSIUM SERPL-SCNC: 3.5 MMOL/L (ref 3.6–5.5)
RBC # BLD AUTO: 2.73 M/UL (ref 4.2–5.4)
SODIUM SERPL-SCNC: 137 MMOL/L (ref 135–145)
WBC # BLD AUTO: 1.8 K/UL (ref 4.8–10.8)

## 2017-11-13 PROCEDURE — A9270 NON-COVERED ITEM OR SERVICE: HCPCS | Performed by: HOSPITALIST

## 2017-11-13 PROCEDURE — 85025 COMPLETE CBC W/AUTO DIFF WBC: CPT

## 2017-11-13 PROCEDURE — 36415 COLL VENOUS BLD VENIPUNCTURE: CPT

## 2017-11-13 PROCEDURE — 99233 SBSQ HOSP IP/OBS HIGH 50: CPT | Performed by: INTERNAL MEDICINE

## 2017-11-13 PROCEDURE — 700102 HCHG RX REV CODE 250 W/ 637 OVERRIDE(OP): Performed by: HOSPITALIST

## 2017-11-13 PROCEDURE — 83735 ASSAY OF MAGNESIUM: CPT

## 2017-11-13 PROCEDURE — 93005 ELECTROCARDIOGRAM TRACING: CPT | Performed by: HOSPITALIST

## 2017-11-13 PROCEDURE — 80048 BASIC METABOLIC PNL TOTAL CA: CPT

## 2017-11-13 PROCEDURE — 700101 HCHG RX REV CODE 250: Performed by: HOSPITALIST

## 2017-11-13 PROCEDURE — 770020 HCHG ROOM/CARE - TELE (206)

## 2017-11-13 PROCEDURE — 93010 ELECTROCARDIOGRAM REPORT: CPT | Performed by: INTERNAL MEDICINE

## 2017-11-13 PROCEDURE — 84100 ASSAY OF PHOSPHORUS: CPT

## 2017-11-13 RX ADMIN — ACETAMINOPHEN 650 MG: 325 TABLET, FILM COATED ORAL at 01:41

## 2017-11-13 RX ADMIN — CHLORDIAZEPOXIDE HYDROCHLORIDE 50 MG: 25 CAPSULE ORAL at 14:40

## 2017-11-13 RX ADMIN — CHLORDIAZEPOXIDE HYDROCHLORIDE 25 MG: 25 CAPSULE ORAL at 22:50

## 2017-11-13 RX ADMIN — MAGNESIUM GLUCONATE 500 MG ORAL TABLET 400 MG: 500 TABLET ORAL at 09:00

## 2017-11-13 RX ADMIN — FOLIC ACID 1 MG: 1 TABLET ORAL at 09:00

## 2017-11-13 RX ADMIN — THERA TABS 1 TABLET: TAB at 09:00

## 2017-11-13 RX ADMIN — MAGNESIUM GLUCONATE 500 MG ORAL TABLET 400 MG: 500 TABLET ORAL at 20:04

## 2017-11-13 RX ADMIN — CHLORDIAZEPOXIDE HYDROCHLORIDE 50 MG: 25 CAPSULE ORAL at 09:00

## 2017-11-13 RX ADMIN — GABAPENTIN 100 MG: 100 CAPSULE ORAL at 20:04

## 2017-11-13 RX ADMIN — Medication 100 MG: at 09:00

## 2017-11-13 RX ADMIN — CHLORDIAZEPOXIDE HYDROCHLORIDE 50 MG: 25 CAPSULE ORAL at 01:41

## 2017-11-13 RX ADMIN — POTASSIUM CHLORIDE AND SODIUM CHLORIDE: 900; 150 INJECTION, SOLUTION INTRAVENOUS at 22:50

## 2017-11-13 RX ADMIN — CHLORDIAZEPOXIDE HYDROCHLORIDE 25 MG: 25 CAPSULE ORAL at 18:22

## 2017-11-13 RX ADMIN — POTASSIUM CHLORIDE AND SODIUM CHLORIDE 125 ML: 900; 150 INJECTION, SOLUTION INTRAVENOUS at 14:41

## 2017-11-13 ASSESSMENT — LIFESTYLE VARIABLES
TREMOR: *
NAUSEA AND VOMITING: NO NAUSEA AND NO VOMITING
TOTAL SCORE: 8
TREMOR: *
TREMOR: NO TREMOR
ORIENTATION AND CLOUDING OF SENSORIUM: ORIENTED AND CAN DO SERIAL ADDITIONS
NAUSEA AND VOMITING: NO NAUSEA AND NO VOMITING
AGITATION: *
TOTAL SCORE: 2
TREMOR: *
ANXIETY: *
NAUSEA AND VOMITING: NO NAUSEA AND NO VOMITING
TREMOR: *
ORIENTATION AND CLOUDING OF SENSORIUM: ORIENTED AND CAN DO SERIAL ADDITIONS
HEADACHE, FULLNESS IN HEAD: NOT PRESENT
AGITATION: SOMEWHAT MORE THAN NORMAL ACTIVITY
AUDITORY DISTURBANCES: NOT PRESENT
TOTAL SCORE: 7
AGITATION: *
AGITATION: *
ANXIETY: *
VISUAL DISTURBANCES: NOT PRESENT
ORIENTATION AND CLOUDING OF SENSORIUM: ORIENTED AND CAN DO SERIAL ADDITIONS
AUDITORY DISTURBANCES: NOT PRESENT
VISUAL DISTURBANCES: NOT PRESENT
AUDITORY DISTURBANCES: NOT PRESENT
AUDITORY DISTURBANCES: NOT PRESENT
ANXIETY: *
AGITATION: *
TREMOR: *
HEADACHE, FULLNESS IN HEAD: NOT PRESENT
ORIENTATION AND CLOUDING OF SENSORIUM: ORIENTED AND CAN DO SERIAL ADDITIONS
PAROXYSMAL SWEATS: NO SWEAT VISIBLE
PAROXYSMAL SWEATS: NO SWEAT VISIBLE
HEADACHE, FULLNESS IN HEAD: NOT PRESENT
NAUSEA AND VOMITING: NO NAUSEA AND NO VOMITING
AGITATION: *
HEADACHE, FULLNESS IN HEAD: NOT PRESENT
HEADACHE, FULLNESS IN HEAD: NOT PRESENT
VISUAL DISTURBANCES: NOT PRESENT
VISUAL DISTURBANCES: NOT PRESENT
NAUSEA AND VOMITING: NO NAUSEA AND NO VOMITING
PAROXYSMAL SWEATS: NO SWEAT VISIBLE
NAUSEA AND VOMITING: NO NAUSEA AND NO VOMITING
VISUAL DISTURBANCES: NOT PRESENT
AUDITORY DISTURBANCES: NOT PRESENT
PAROXYSMAL SWEATS: NO SWEAT VISIBLE
TOTAL SCORE: 8
ORIENTATION AND CLOUDING OF SENSORIUM: ORIENTED AND CAN DO SERIAL ADDITIONS
TOTAL SCORE: 7
AUDITORY DISTURBANCES: NOT PRESENT
HEADACHE, FULLNESS IN HEAD: NOT PRESENT
PAROXYSMAL SWEATS: NO SWEAT VISIBLE
ANXIETY: MILDLY ANXIOUS
ANXIETY: *
VISUAL DISTURBANCES: NOT PRESENT
ORIENTATION AND CLOUDING OF SENSORIUM: ORIENTED AND CAN DO SERIAL ADDITIONS
PAROXYSMAL SWEATS: NO SWEAT VISIBLE
ANXIETY: *
TOTAL SCORE: 6

## 2017-11-13 ASSESSMENT — PAIN SCALES - GENERAL
PAINLEVEL_OUTOF10: 0

## 2017-11-13 ASSESSMENT — ENCOUNTER SYMPTOMS
ABDOMINAL PAIN: 0
SHORTNESS OF BREATH: 0
BLURRED VISION: 0
WEIGHT LOSS: 0
DIARRHEA: 0
CHILLS: 0
DIZZINESS: 0
VOMITING: 0
FOCAL WEAKNESS: 0
NAUSEA: 0
HEADACHES: 0
NERVOUS/ANXIOUS: 0
DOUBLE VISION: 0
FEVER: 0
PALPITATIONS: 0
COUGH: 0

## 2017-11-13 NOTE — PROGRESS NOTES
[facility] Hospitalist Progress Note    Date of Service: 2017    Chief Complaint  52 y.o. female admitted 2017 with EtOH abuse who presented with EtOH withdrawal and seizure    Interval Problem Update  CIWA ranging 8-14. Received ativan, on librium and lindsey  Prolonged QTC continues to be prolonged, monitoring with EKG and med review  Cytopenia noted, has SCDs, check vit b12 and folate  Patient wishing to leave. Last drink was 2 days ago. Discussed with patient and , she agrees to stay and continue treatment.    Consultants/Specialty  None    Disposition  Home pending EtOH withdrawal treatment        Review of Systems   Constitutional: Negative for chills, fever and weight loss.   Eyes: Negative for blurred vision and double vision.   Respiratory: Negative for cough and shortness of breath.    Cardiovascular: Negative for chest pain, palpitations and leg swelling.   Gastrointestinal: Negative for abdominal pain, diarrhea, nausea and vomiting.   Genitourinary: Negative for dysuria.   Skin: Negative for rash.   Neurological: Negative for dizziness, focal weakness and headaches.   Psychiatric/Behavioral: The patient is not nervous/anxious.       Physical Exam  Laboratory/Imaging   Hemodynamics  Temp (24hrs), Av.3 °C (99.1 °F), Min:36.8 °C (98.3 °F), Max:37.8 °C (100.1 °F)   Temperature: 37.1 °C (98.7 °F)  Pulse  Av  Min: 85  Max: 129 Heart Rate (Monitored): 91  Blood Pressure: 112/82      Respiratory      Respiration: 20, Pulse Oximetry: 96 %             Fluids    Intake/Output Summary (Last 24 hours) at 17 1751  Last data filed at 17 0000   Gross per 24 hour   Intake             1140 ml   Output                0 ml   Net             1140 ml       Nutrition  Orders Placed This Encounter   Procedures   • Diet Order     Standing Status:   Standing     Number of Occurrences:   1     Order Specific Question:   Diet:     Answer:   Regular [1]     Physical Exam   Constitutional: She is  oriented to person, place, and time. She appears well-developed and well-nourished. She is cooperative.   HENT:   Head: Normocephalic and atraumatic.   Eyes: EOM are normal.   Icteric sclera   Cardiovascular: Normal rate, regular rhythm and normal heart sounds.    tachycardic   Pulmonary/Chest: Effort normal. She has no wheezes.   Abdominal: Soft. There is no tenderness. There is no rebound and no guarding.   Musculoskeletal: She exhibits no edema.   Neurological: She is alert and oriented to person, place, and time.   Tremors present   Skin: Skin is warm and dry.   Nursing note and vitals reviewed.      Recent Labs      11/12/17   1547  11/13/17   0207   WBC  1.5*  1.8*   RBC  3.28*  2.73*   HEMOGLOBIN  10.3*  8.7*   HEMATOCRIT  32.0*  27.1*   MCV  97.6  99.3*   MCH  31.4  31.9   MCHC  32.2*  32.1*   RDW  48.1  49.4   PLATELETCT  44*  37*   MPV  9.7  9.6     Recent Labs      11/12/17   1547  11/13/17   0207   SODIUM  135  137   POTASSIUM  3.2*  3.5*   CHLORIDE  98  107   CO2  23  24   GLUCOSE  176*  103*   BUN  15  11   CREATININE  0.70  0.57   CALCIUM  10.3  9.2                      Assessment/Plan     Alcohol withdrawal (CMS-[facility])- (present on admission)   Assessment & Plan    Severe alcohol draw an alcohol withdrawal seizure.   - CIWA protocol with ativan  - continue librium and gabapentin  - we discussed plan once d/c, she is interested in re-joining AA        Pancytopenia (CMS-[facility])- (present on admission)   Assessment & Plan    Secondary to ETOH toxicity to bone marrow with suppression.   - monitor  - SCDs        Macrocytic anemia   Assessment & Plan    Folate and vit B12 ordered        QT prolongation   Assessment & Plan    Monitor on telemetry and EKGs  Electrolyte repletion  Medication review        Hyperbilirubinemia- (present on admission)   Assessment & Plan    Her bilirubin is 4.6 and was 4.1 on her last admission in June, concerning for alcoholic hepatitis.  - measure INR for discriminant function  score  - patient says she refuses to have liver US done  - monitor LFTs        Hypokalemia- (present on admission)   Assessment & Plan    Repleted through IVF  - monitor BMP            Reviewed items::  EKG reviewed, Labs reviewed, Medications reviewed and Radiology images reviewed  Escalera catheter::  No Escalera  DVT prophylaxis pharmacological::  Contraindicated - High bleeding risk  DVT prophylaxis - mechanical:  SCDs

## 2017-11-13 NOTE — CARE PLAN
Problem: Safety  Goal: Will remain free from injury  Outcome: PROGRESSING AS EXPECTED  Carito Pride Fall Risk Assessment:     Last Known Fall: Within the last month  Mobility: Dizziness/generalized weakness  Medications: Cardiovascular or central nervous system meds  Mental Status/LOC/Awareness: Oriented to person and place  Toileting Needs: No needs  Volume/Electrolyte Status: Use of IV fluids/tube feeds  Communication/Sensory: Visual (Glasses)/hearing deficit  Behavior: Ethanol/substance abuse  Carito Pride Fall Risk Total: 14  Fall Risk Level: MODERATE RISK    Universal Fall Precautions:  call light/belongings in reach, bed in low position and locked, wheelchairs and assistive devices out of sight, siderails up x 2, use non-slip footwear, adequate lighting, clutter free and spill free environment, educate on level of risk, educate to call for assistance    Fall Risk Level Interventions:    TRIAL (TELE 8, NEURO, MED MELANIE 5) Moderate Fall Risk Interventions  Provide patient/family education based on risk assessment : completed  Educate patient/family to call staff for assistance when getting out of bed: completed  Place fall precaution signage outside patient door: completed  Utilize bed/chair fall alarm: completed     Patient Specific Interventions:     Medication: review medications with patient and family, limit combination of prn medications and assess need for orthostatic hypotension evaluation  Mental Status/LOC/Awareness: reorient patient, reinforce falls education, check on patient hourly, utilize bed/chair fall alarm, reinforce the use of call light and provide activity  Toileting: provide frquent toileting, monitor intake and output/use of appropriate interventions, instruct patient/family on the use of grab bars, instruct patient/family on the need to call for assistance when toileting and do not leave patient unattended in bathroom/refer to toileting scripting  Volume/Electrolyte Status: ensure  patient remains hydrated, monitor blood sugars and maintain appropriate blood sugar levels if diabetic, administer medications as ordered for nausea and vomiting, monitor abnormal lab values, ensure IV fluids are appropriate and teach patients to dangle before rising if hypotensive  Communication/Sensory: update plan of care on whiteboard, ensure proper positioning when transferrng/ambulating and for visually impaired patients orient to their room surrounding and do not change their surroundings  Behavioral: encourage patient to voice feelings, engage patient in daily activities, administer medication as ordered, initiate plan of care for alcohol withdrawal and instruct/reinforce fall program rationale  Mobility: schedule physical activity throughout the day, provide comfort measures during transport, dangle prior to standing, utilize bed/chair fall alarm, ensure bed is locked and in lowest position, provide appropriate assistive device and instruct patient to exit bed on their strongest side    Problem: Infection  Goal: Will remain free from infection  Outcome: PROGRESSING AS EXPECTED  Remains free from signs and symptoms of infection. Standard precautions implemented. Hand hygiene before and after contact with pt. Educated about importance of hand hygiene.

## 2017-11-13 NOTE — PROGRESS NOTES
Pt up from ER. Ambulated with hand held assist to bed. Pt has moderate tremors. Disoriented to time,  at bedside. Seizure precautions set up. Denies pain, no distress.

## 2017-11-13 NOTE — ASSESSMENT & PLAN NOTE
Severe alcohol draw an alcohol withdrawal seizure.   - CIWA protocol with ativan  - continue librium and gabapentin  - we discussed plan once d/c, she is interested in re-joining AA

## 2017-11-13 NOTE — PROGRESS NOTES
2 RN skin assessment performed by Jacki RN and ALEXANDR Clifford. Tongue red, swollen, and cut from biting tongue. No other skin breakdown present on admission

## 2017-11-13 NOTE — ASSESSMENT & PLAN NOTE
Her bilirubin is 4.6 and was 4.1 on her last admission in June, concerning for alcoholic hepatitis.  - measure INR for discriminant function score  - patient says she refuses to have liver US done  - monitor LFTs

## 2017-11-13 NOTE — PROGRESS NOTES
Assumed care of pt. Pt sleeping at the moment, no s/s distress. RR even unlabored. ST on monitor. Will cont to monitor.

## 2017-11-13 NOTE — PROGRESS NOTES
Assumed pt care. Pt resting in bed. Plan of care discussed with pt. Verbalizes understanding. Bed in lowest position, locked, and alarm activated. Call light within reach. Seizure precautions in place. ST on monitor.

## 2017-11-13 NOTE — H&P
" Hospital Medicine History and Physical    Date of Service  11/12/2017    Chief Complaint  Chief Complaint   Patient presents with   • ETOH Withdrawal     Last drink last night, unknown time. States she wants to detox. 1/5 vodka per day. Tremulous.    • Seizure     4 minute seizure witnessed by        History of Presenting Illness  52 y.o. female who presented 11/12/2017 withAn alcohol withdrawal seizure. Ms. Day has a history of significant alcohol abuse was most recently admitted June 2017 with severe alcohol withdrawal. 3 weeks after admission and discharge she started drinking again somewhere in July. His been drinking about 1 bottle of vodka per day. Last drink was about 2 days ago and today she developed a tonic clonic seizure. She'll be admitted for benzodiazepine therapy as well as treatment of her hypokalemia. He had a long discussion about cessation with her  in the room we did discuss the possibility of outpatient alcohol rehab.  Primary Care Physician  Pcp Pt States None    Consultants      Code Status  Full code    Review of Systems  Review of Systems   Constitutional:        She states today her \"whole body hurts\" after the seizure.   Respiratory: Negative for cough and shortness of breath.    Cardiovascular: Negative for chest pain and leg swelling.   Neurological:        Seizure today   Psychiatric/Behavioral: The patient is nervous/anxious and has insomnia.    All other systems reviewed and are negative.       Past Medical History  Past Medical History:   Diagnosis Date   • Alcoholic hepatitis    • ETOH abuse    • Thrombocytopenia (CMS-HCC)        Surgical History  No past surgical history on file.    Medications  No current facility-administered medications on file prior to encounter.      Current Outpatient Prescriptions on File Prior to Encounter   Medication Sig Dispense Refill   • lorazepam (ATIVAN) 0.5 MG Tab Take 1 Tab by mouth every four hours as needed for " Anxiety. 10 Tab 0   • gabapentin (NEURONTIN) 100 MG Cap Take 1 Cap by mouth every bedtime. 30 Cap 0   • cyclobenzaprine (FLEXERIL) 10 MG Tab Take 10 mg by mouth 3 times a day as needed for Muscle Spasms.         Family History  Family History   Problem Relation Age of Onset   • Hypertension Mother    • Cancer Mother      breast   • Hypertension Father        Social History  Social History   Substance Use Topics   • Smoking status: Never Smoker   • Smokeless tobacco: Never Used   • Alcohol use Yes      Comment: daily       Allergies  No Known Allergies     Physical Exam  Laboratory   Hemodynamics  Temp (24hrs), Av.3 °C (99.1 °F), Min:36.9 °C (98.4 °F), Max:37.6 °C (99.7 °F)   Temperature: 37.6 °C (99.7 °F) (RN notified)  Pulse  Av.9  Min: 85  Max: 129 Heart Rate (Monitored): 91  Blood Pressure: 137/90, NIBP: 110/66      Respiratory      Respiration: 17, Pulse Oximetry: 98 %             Physical Exam   Constitutional: She is oriented to person, place, and time. She appears distressed.   HENT:   She has multiple bite marks on the right side of her tongue.   Eyes: Scleral icterus is present.   Neck: Normal range of motion. Neck supple.   Cardiovascular:   No murmur heard.  Tachycardia   Pulmonary/Chest: Effort normal. No respiratory distress. She has no wheezes.   Abdominal: Soft. She exhibits no distension. There is no tenderness.   Musculoskeletal: She exhibits no edema or tenderness.   Neurological: She is alert and oriented to person, place, and time.   Her hands are tremulous   Skin: Skin is warm and dry. She is not diaphoretic.   She does not appear to have spider angiomata  She is jaundiced   Psychiatric:   She was somewhat anxious   is at bedside       Recent Labs      17   1547   WBC  1.5*   RBC  3.28*   HEMOGLOBIN  10.3*   HEMATOCRIT  32.0*   MCV  97.6   MCH  31.4   MCHC  32.2*   RDW  48.1   PLATELETCT  44*   MPV  9.7     Recent Labs      17   1547   SODIUM  135   POTASSIUM  3.2*    CHLORIDE  98   CO2  23   GLUCOSE  176*   BUN  15   CREATININE  0.70   CALCIUM  10.3     Recent Labs      11/12/17   1547   ALTSGPT  36   ASTSGOT  108*   ALKPHOSPHAT  210*   TBILIRUBIN  4.6*   LIPASE  33   GLUCOSE  176*                 No results found for: TROPONINI  Urinalysis:  No results found for: SPECGRAVITY, GLUCOSEUR, KETONES, NITRITE, WBCURINE, RBCURINE, BACTERIA, EPITHELCELL     Imaging  No orders to display        Assessment/Plan     I anticipate this patient will require at least two midnights for appropriate medical management, necessitating inpatient admission.    Alcohol withdrawal (CMS-HCC)- (present on admission)   Assessment & Plan    Severe alcohol draw an alcohol withdrawal seizure. She'll be admitted to a monitored bed and will be on seizure precautions.  She has been placed on the CIWA protocol. Librium has been ordered.         Pancytopenia (CMS-HCC)- (present on admission)   Assessment & Plan    Secondary to ETOH toxicity to bone marrow with suppression. Recheck labs in the morning. Refrain from Lovenox as her platelet count is only 44,000.        Hyperbilirubinemia- (present on admission)   Assessment & Plan    Her bilirubin is 4.6 and was 4.1 on her last admission in June. This is concerning for cirrhosis.        Hypokalemia- (present on admission)   Assessment & Plan    Potassium is 3.2 in the emergency room and will be replenished intravenously and magnesium as well.  A basic metabolic panel has been ordered for the morning.            VTE prophylaxis: SCDs.

## 2017-11-13 NOTE — CARE PLAN
Problem: Safety  Goal: Will remain free from injury  Outcome: PROGRESSING AS EXPECTED  Carito Pride Fall Risk Assessment:     Last Known Fall: Within the last month  Mobility: Dizziness/generalized weakness  Medications: Cardiovascular or central nervous system meds  Mental Status/LOC/Awareness: Oriented to person and place  Toileting Needs: No needs  Volume/Electrolyte Status: Use of IV fluids/tube feeds  Communication/Sensory: Visual (Glasses)/hearing deficit  Behavior: Ethanol/substance abuse  Carito Pride Fall Risk Total: 14  Fall Risk Level: MODERATE RISK    Universal Fall Precautions:  call light/belongings in reach, bed in low position and locked, wheelchairs and assistive devices out of sight, siderails up x 2, use non-slip footwear, adequate lighting, clutter free and spill free environment, educate on level of risk, educate to call for assistance    Fall Risk Level Interventions:    TRIAL (TELE 8, NEURO, MED MELANIE 5) Moderate Fall Risk Interventions  Provide patient/family education based on risk assessment : completed  Educate patient/family to call staff for assistance when getting out of bed: completed  Place fall precaution signage outside patient door: completed  Utilize bed/chair fall alarm: completed     Patient Specific Interventions:     Medication: review medications with patient and family  Mental Status/LOC/Awareness: reinforce falls education, check on patient hourly, utilize bed/chair fall alarm and reinforce the use of call light  Toileting: provide frquent toileting  Volume/Electrolyte Status: ensure patient remains hydrated, monitor abnormal lab values and ensure IV fluids are appropriate  Communication/Sensory: update plan of care on whiteboard  Behavioral: not applicable  Mobility: utilize bed/chair fall alarm, ensure bed is locked and in lowest position and provide appropriate assistive device    Problem: Bowel/Gastric:  Goal: Normal bowel function is maintained or improved  Outcome:  PROGRESSING AS EXPECTED      Problem: Pain Management  Goal: Pain level will decrease to patient's comfort goal  Outcome: PROGRESSING AS EXPECTED

## 2017-11-13 NOTE — PROGRESS NOTES
Pt irritable but denies n/v, headache or visual/tactile disturbances. Pt angry that she was not left alone in bathroom for her safety. Education re alarms and that she was too unsteady when she stood up but pt remains irritable. Pt initially refused ECG this am. Education need for repeat tests. Pt agreed to have test complete and revealed prolonged qt. Will compare to prev ecg

## 2017-11-14 VITALS
HEIGHT: 68 IN | SYSTOLIC BLOOD PRESSURE: 126 MMHG | HEART RATE: 119 BPM | RESPIRATION RATE: 20 BRPM | OXYGEN SATURATION: 92 % | DIASTOLIC BLOOD PRESSURE: 90 MMHG | TEMPERATURE: 98.9 F | WEIGHT: 167.99 LBS | BODY MASS INDEX: 25.46 KG/M2

## 2017-11-14 PROBLEM — F10.939 ALCOHOL WITHDRAWAL (HCC): Status: RESOLVED | Noted: 2017-06-21 | Resolved: 2017-11-14

## 2017-11-14 PROBLEM — E87.6 HYPOKALEMIA: Status: RESOLVED | Noted: 2017-11-12 | Resolved: 2017-11-14

## 2017-11-14 PROBLEM — R94.31 QT PROLONGATION: Status: RESOLVED | Noted: 2017-11-13 | Resolved: 2017-11-14

## 2017-11-14 LAB
ALBUMIN SERPL BCP-MCNC: 3.6 G/DL (ref 3.2–4.9)
ALBUMIN/GLOB SERPL: 1.6 G/DL
ALP SERPL-CCNC: 166 U/L (ref 30–99)
ALT SERPL-CCNC: 29 U/L (ref 2–50)
ANION GAP SERPL CALC-SCNC: 6 MMOL/L (ref 0–11.9)
AST SERPL-CCNC: 103 U/L (ref 12–45)
BASOPHILS # BLD AUTO: 1.3 % (ref 0–1.8)
BASOPHILS # BLD: 0.02 K/UL (ref 0–0.12)
BILIRUB SERPL-MCNC: 4.1 MG/DL (ref 0.1–1.5)
BUN SERPL-MCNC: 6 MG/DL (ref 8–22)
CALCIUM SERPL-MCNC: 9.2 MG/DL (ref 8.5–10.5)
CHLORIDE SERPL-SCNC: 111 MMOL/L (ref 96–112)
CO2 SERPL-SCNC: 23 MMOL/L (ref 20–33)
CREAT SERPL-MCNC: 0.56 MG/DL (ref 0.5–1.4)
EKG IMPRESSION: NORMAL
EOSINOPHIL # BLD AUTO: 0.06 K/UL (ref 0–0.51)
EOSINOPHIL NFR BLD: 3.8 % (ref 0–6.9)
ERYTHROCYTE [DISTWIDTH] IN BLOOD BY AUTOMATED COUNT: 52.9 FL (ref 35.9–50)
FOLATE SERPL-MCNC: 9 NG/ML
GFR SERPL CREATININE-BSD FRML MDRD: >60 ML/MIN/1.73 M 2
GLOBULIN SER CALC-MCNC: 2.2 G/DL (ref 1.9–3.5)
GLUCOSE SERPL-MCNC: 90 MG/DL (ref 65–99)
HCT VFR BLD AUTO: 28.8 % (ref 37–47)
HGB BLD-MCNC: 9.1 G/DL (ref 12–16)
IMM GRANULOCYTES # BLD AUTO: 0 K/UL (ref 0–0.11)
IMM GRANULOCYTES NFR BLD AUTO: 0 % (ref 0–0.9)
INR PPP: 1.37 (ref 0.87–1.13)
LYMPHOCYTES # BLD AUTO: 0.64 K/UL (ref 1–4.8)
LYMPHOCYTES NFR BLD: 40.8 % (ref 22–41)
MAGNESIUM SERPL-MCNC: 2.1 MG/DL (ref 1.5–2.5)
MCH RBC QN AUTO: 32.6 PG (ref 27–33)
MCHC RBC AUTO-ENTMCNC: 31.6 G/DL (ref 33.6–35)
MCV RBC AUTO: 103.2 FL (ref 81.4–97.8)
MONOCYTES # BLD AUTO: 0.21 K/UL (ref 0–0.85)
MONOCYTES NFR BLD AUTO: 13.4 % (ref 0–13.4)
NEUTROPHILS # BLD AUTO: 0.64 K/UL (ref 2–7.15)
NEUTROPHILS NFR BLD: 40.7 % (ref 44–72)
NRBC # BLD AUTO: 0 K/UL
NRBC BLD AUTO-RTO: 0 /100 WBC
PHOSPHATE SERPL-MCNC: 1.6 MG/DL (ref 2.5–4.5)
PLATELET # BLD AUTO: 40 K/UL (ref 164–446)
PMV BLD AUTO: 10.5 FL (ref 9–12.9)
POTASSIUM SERPL-SCNC: 3.7 MMOL/L (ref 3.6–5.5)
PROT SERPL-MCNC: 5.8 G/DL (ref 6–8.2)
PROTHROMBIN TIME: 16.6 SEC (ref 12–14.6)
RBC # BLD AUTO: 2.79 M/UL (ref 4.2–5.4)
SODIUM SERPL-SCNC: 140 MMOL/L (ref 135–145)
VIT B12 SERPL-MCNC: 254 PG/ML (ref 211–911)
WBC # BLD AUTO: 1.6 K/UL (ref 4.8–10.8)

## 2017-11-14 PROCEDURE — 85025 COMPLETE CBC W/AUTO DIFF WBC: CPT

## 2017-11-14 PROCEDURE — 82746 ASSAY OF FOLIC ACID SERUM: CPT

## 2017-11-14 PROCEDURE — 83735 ASSAY OF MAGNESIUM: CPT

## 2017-11-14 PROCEDURE — 82607 VITAMIN B-12: CPT

## 2017-11-14 PROCEDURE — 80053 COMPREHEN METABOLIC PANEL: CPT

## 2017-11-14 PROCEDURE — 93005 ELECTROCARDIOGRAM TRACING: CPT | Performed by: HOSPITALIST

## 2017-11-14 PROCEDURE — 36415 COLL VENOUS BLD VENIPUNCTURE: CPT

## 2017-11-14 PROCEDURE — 93010 ELECTROCARDIOGRAM REPORT: CPT | Performed by: INTERNAL MEDICINE

## 2017-11-14 PROCEDURE — 99239 HOSP IP/OBS DSCHRG MGMT >30: CPT | Performed by: HOSPITALIST

## 2017-11-14 PROCEDURE — 82747 ASSAY OF FOLIC ACID RBC: CPT

## 2017-11-14 PROCEDURE — G8978 MOBILITY CURRENT STATUS: HCPCS | Mod: CI

## 2017-11-14 PROCEDURE — G8979 MOBILITY GOAL STATUS: HCPCS | Mod: CI

## 2017-11-14 PROCEDURE — 85610 PROTHROMBIN TIME: CPT

## 2017-11-14 PROCEDURE — 97161 PT EVAL LOW COMPLEX 20 MIN: CPT

## 2017-11-14 PROCEDURE — 700102 HCHG RX REV CODE 250 W/ 637 OVERRIDE(OP): Performed by: HOSPITALIST

## 2017-11-14 PROCEDURE — G8980 MOBILITY D/C STATUS: HCPCS | Mod: CI

## 2017-11-14 PROCEDURE — 84100 ASSAY OF PHOSPHORUS: CPT

## 2017-11-14 PROCEDURE — A9270 NON-COVERED ITEM OR SERVICE: HCPCS | Performed by: HOSPITALIST

## 2017-11-14 RX ORDER — CHLORDIAZEPOXIDE HYDROCHLORIDE 25 MG/1
25 CAPSULE, GELATIN COATED ORAL EVERY 6 HOURS
Qty: 4 CAP | Refills: 0 | Status: SHIPPED | OUTPATIENT
Start: 2017-11-14 | End: 2017-11-15

## 2017-11-14 RX ORDER — LANOLIN ALCOHOL/MO/W.PET/CERES
100 CREAM (GRAM) TOPICAL DAILY
Qty: 30 TAB | Refills: 0 | Status: SHIPPED | OUTPATIENT
Start: 2017-11-14 | End: 2018-07-25

## 2017-11-14 RX ORDER — FOLIC ACID 1 MG/1
1 TABLET ORAL DAILY
Qty: 30 TAB | Refills: 0 | Status: SHIPPED | OUTPATIENT
Start: 2017-11-14 | End: 2018-07-25

## 2017-11-14 RX ADMIN — THERA TABS 1 TABLET: TAB at 07:57

## 2017-11-14 RX ADMIN — MAGNESIUM GLUCONATE 500 MG ORAL TABLET 400 MG: 500 TABLET ORAL at 07:57

## 2017-11-14 RX ADMIN — FOLIC ACID 1 MG: 1 TABLET ORAL at 07:57

## 2017-11-14 RX ADMIN — Medication 100 MG: at 07:56

## 2017-11-14 RX ADMIN — CHLORDIAZEPOXIDE HYDROCHLORIDE 25 MG: 25 CAPSULE ORAL at 11:22

## 2017-11-14 RX ADMIN — CHLORDIAZEPOXIDE HYDROCHLORIDE 25 MG: 25 CAPSULE ORAL at 05:12

## 2017-11-14 ASSESSMENT — LIFESTYLE VARIABLES
AUDITORY DISTURBANCES: NOT PRESENT
ORIENTATION AND CLOUDING OF SENSORIUM: ORIENTED AND CAN DO SERIAL ADDITIONS
NAUSEA AND VOMITING: NO NAUSEA AND NO VOMITING
HEADACHE, FULLNESS IN HEAD: NOT PRESENT
TOTAL SCORE: 7
ORIENTATION AND CLOUDING OF SENSORIUM: ORIENTED AND CAN DO SERIAL ADDITIONS
VISUAL DISTURBANCES: NOT PRESENT
AUDITORY DISTURBANCES: NOT PRESENT
TOTAL SCORE: 5
AUDITORY DISTURBANCES: NOT PRESENT
HEADACHE, FULLNESS IN HEAD: NOT PRESENT
PAROXYSMAL SWEATS: NO SWEAT VISIBLE
PAROXYSMAL SWEATS: NO SWEAT VISIBLE
TOTAL SCORE: 2
NAUSEA AND VOMITING: NO NAUSEA AND NO VOMITING
ORIENTATION AND CLOUDING OF SENSORIUM: ORIENTED AND CAN DO SERIAL ADDITIONS
AGITATION: SOMEWHAT MORE THAN NORMAL ACTIVITY
ANXIETY: *
AGITATION: MODERATELY FIDGETY AND RESTLESS
PAROXYSMAL SWEATS: NO SWEAT VISIBLE
TREMOR: NO TREMOR
VISUAL DISTURBANCES: NOT PRESENT
HEADACHE, FULLNESS IN HEAD: NOT PRESENT
ANXIETY: *
AGITATION: *
NAUSEA AND VOMITING: NO NAUSEA AND NO VOMITING
HEADACHE, FULLNESS IN HEAD: NOT PRESENT
ORIENTATION AND CLOUDING OF SENSORIUM: ORIENTED AND CAN DO SERIAL ADDITIONS
VISUAL DISTURBANCES: NOT PRESENT
VISUAL DISTURBANCES: NOT PRESENT
PAROXYSMAL SWEATS: NO SWEAT VISIBLE
AGITATION: SOMEWHAT MORE THAN NORMAL ACTIVITY
AUDITORY DISTURBANCES: NOT PRESENT
ANXIETY: MILDLY ANXIOUS
NAUSEA AND VOMITING: NO NAUSEA AND NO VOMITING
TOTAL SCORE: 2
TREMOR: NO TREMOR
TREMOR: NO TREMOR
ANXIETY: MILDLY ANXIOUS
TREMOR: NO TREMOR

## 2017-11-14 ASSESSMENT — COGNITIVE AND FUNCTIONAL STATUS - GENERAL
SUGGESTED CMS G CODE MODIFIER MOBILITY: CH
MOBILITY SCORE: 24

## 2017-11-14 ASSESSMENT — PAIN SCALES - GENERAL: PAINLEVEL_OUTOF10: 0

## 2017-11-14 ASSESSMENT — GAIT ASSESSMENTS
GAIT LEVEL OF ASSIST: STAND BY ASSIST
DISTANCE (FEET): 500
DEVIATION: OTHER (COMMENT)

## 2017-11-14 NOTE — PROGRESS NOTES
Assumed pt care from day shift RN. VSS. In no acute distress. No c/o pain. Bed in locked and low position with bed alarm applied. Pt is known to jump out of bed. RN and CNA informed. Will continue to monitor.

## 2017-11-14 NOTE — THERAPY
"Physical Therapy Evaluation completed.   Bed Mobility:  Supine to Sit:  (found up on couch)  Transfers: Sit to Stand: Supervised  Gait: Level Of Assist: Stand by Assist with No Equipment Needed       Plan of Care: Patient with no further skilled PT needs in the acute care setting at this time  Discharge Recommendations: Equipment: No Equipment Needed. Post-acute therapy Currently anticipate no further skilled therapy needs once patient is discharged from the inpatient setting.        See \"Rehab Therapy-Acute\" Patient Summary Report for complete documentation.     "

## 2017-11-14 NOTE — CARE PLAN
Problem: Safety  Goal: Will remain free from injury  Outcome: PROGRESSING SLOWER THAN EXPECTED  Arzatedae Pride Fall Risk Assessment:     Last Known Fall: Within the last month  Mobility: Dizziness/generalized weakness  Medications: Cardiovascular or central nervous system meds  Mental Status/LOC/Awareness: Oriented to person and place  Toileting Needs: No needs  Volume/Electrolyte Status: Use of IV fluids/tube feeds  Communication/Sensory: Visual (Glasses)/hearing deficit  Behavior: Ethanol/substance abuse  Carito Pride Fall Risk Total: 14  Fall Risk Level: MODERATE RISK    Universal Fall Precautions:  call light/belongings in reach, bed in low position and locked, wheelchairs and assistive devices out of sight, siderails up x 2, use non-slip footwear, adequate lighting, clutter free and spill free environment, educate on level of risk, educate to call for assistance    Fall Risk Level Interventions:    TRIAL (TELE 8, NEURO, MED MELANIE 5) Moderate Fall Risk Interventions  Provide patient/family education based on risk assessment : completed  Educate patient/family to call staff for assistance when getting out of bed: completed  Place fall precaution signage outside patient door: completed  Utilize bed/chair fall alarm: completed     Patient Specific Interventions:     Medication: review medications with patient and family  Mental Status/LOC/Awareness: reinforce falls education, check on patient hourly, utilize bed/chair fall alarm and reinforce the use of call light  Toileting: provide frquent toileting and do not leave patient unattended in bathroom/refer to toileting scripting  Volume/Electrolyte Status: monitor abnormal lab values and ensure IV fluids are appropriate  Communication/Sensory: update plan of care on whiteboard  Behavioral: initiate plan of care for alcohol withdrawal  Mobility: utilize bed/chair fall alarm, ensure bed is locked and in lowest position, provide appropriate assistive device and  collaborate with doctor for possible PT/OT consult    Problem: Bowel/Gastric:  Goal: Normal bowel function is maintained or improved  Outcome: PROGRESSING AS EXPECTED      Problem: Pain Management  Goal: Pain level will decrease to patient's comfort goal  Outcome: PROGRESSING AS EXPECTED

## 2017-11-14 NOTE — DISCHARGE INSTRUCTIONS
Discharge Instructions    Discharged to home by car with self. Discharged via walking, hospital escort: Refused.  Special equipment needed: Not Applicable    Be sure to schedule a follow-up appointment with your primary care doctor or any specialists as instructed.     Discharge Plan:   Dc home today   Needs f/u with PCP in 3-5 days   Needs bmp and phos levels in 3 days.       I understand that a diet low in cholesterol, fat, and sodium is recommended for good health. Unless I have been given specific instructions below for another diet, I accept this instruction as my diet prescription.   Other diet: NA    Special Instructions: None    · Is patient discharged on Warfarin / Coumadin?   No     · Is patient Post Blood Transfusion?  No    Depression / Suicide Risk    As you are discharged from this [facility] facility, it is important to learn how to keep safe from harming yourself.    Recognize the warning signs:  · Abrupt changes in personality, positive or negative- including increase in energy   · Giving away possessions  · Change in eating patterns- significant weight changes-  positive or negative  · Change in sleeping patterns- unable to sleep or sleeping all the time   · Unwillingness or inability to communicate  · Depression  · Unusual sadness, discouragement and loneliness  · Talk of wanting to die  · Neglect of personal appearance   · Rebelliousness- reckless behavior  · Withdrawal from people/activities they love  · Confusion- inability to concentrate     If you or a loved one observes any of these behaviors or has concerns about self-harm, here's what you can do:  · Talk about it- your feelings and reasons for harming yourself  · Remove any means that you might use to hurt yourself (examples: pills, rope, extension cords, firearm)  · Get professional help from the community (Mental Health, Substance Abuse, psychological counseling)  · Do not be alone:Call your Safe Contact- someone whom you trust who  will be there for you.  · Call your local CRISIS HOTLINE 381-3338 or 624-969-6629  · Call your local Children's Mobile Crisis Response Team [place] (716) 356-2971 or www.[employer] plugin  · Call the toll free National Suicide Prevention Hotlines   · National Suicide Prevention Lifeline 820-120-AJAI (9594)  · National [employer] Line Network 800-SUICIDE (690-0045)

## 2017-11-14 NOTE — PROGRESS NOTES
Carito Pride Fall Risk Assessment:     Last Known Fall: Within the last month  Mobility: Dizziness/generalized weakness  Medications: Cardiovascular or central nervous system meds  Mental Status/LOC/Awareness: Oriented to person and place  Toileting Needs: No needs  Volume/Electrolyte Status: Use of IV fluids/tube feeds  Communication/Sensory: Visual (Glasses)/hearing deficit  Behavior: Ethanol/substance abuse  Carito Pride Fall Risk Total: 14  Fall Risk Level: MODERATE RISK    Universal Fall Precautions:  call light/belongings in reach, bed in low position and locked, wheelchairs and assistive devices out of sight, siderails up x 2, use non-slip footwear, adequate lighting, clutter free and spill free environment, educate on level of risk, educate to call for assistance    Fall Risk Level Interventions:    TRIAL (TELE 8, NEURO, MED MELANIE 5) Moderate Fall Risk Interventions  Provide patient/family education based on risk assessment : completed  Educate patient/family to call staff for assistance when getting out of bed: completed  Place fall precaution signage outside patient door: completed  Utilize bed/chair fall alarm: completed     Patient Specific Interventions:     Medication: limit combination of prn medications  Mental Status/LOC/Awareness: utilize bed/chair fall alarm and reinforce the use of call light  Toileting: instruct male patients prone to dizziness to void while sitting and instruct patient/family on the use of grab bars  Volume/Electrolyte Status: monitor abnormal lab values  Communication/Sensory: ensure proper positioning when transferrng/ambulating  Behavioral: administer medication as ordered, initiate plan of care for alcohol withdrawal and instruct/reinforce fall program rationale  Mobility: utilize bed/chair fall alarm and ensure bed is locked and in lowest position

## 2017-11-14 NOTE — PROGRESS NOTES
Edmund from Lab called with critical result of WBC 1.6 and PLT 40 at 0425. Critical lab result read back to Edmund.   Charge RN notified. Decision not to page MD as labs have been critical and are trending up. Will convey to day shift.

## 2017-11-14 NOTE — PROGRESS NOTES
Pt became very agitated about not being seen by MD before 11, pulled out IV and got dressed and tried to leave the building. RN's asked her to come back to her room and the doctor would see her next.   Pt was d/c'ed, refused most of discharge teachings, accepted medication prescription

## 2017-11-14 NOTE — PROGRESS NOTES
Bedside report received, no c/o pain, pt getting up to Br, refusing to sit back in bed with alarms and insisting on going home today, call light in reach

## 2017-11-14 NOTE — PROGRESS NOTES
Discharge Instructions    Discharged to home by taxi with self. Discharged via walking: Hospital escort: Refused.  Special equipment needed: Not Applicable    Be sure to schedule a follow-up appointment with your primary care doctor or any specialists as instructed.     Discharge Plan: Follow up with PCP, get labs drawn in a week       I understand that a diet low in cholesterol, fat, and sodium is recommended for good health. Unless I have been given specific instructions below for another diet, I accept this instruction as my diet prescription.   Other diet: NA    Special Instructions: None    · Is patient discharged on Warfarin / Coumadin?   No     · Is patient Post Blood Transfusion?  No    Depression / Suicide Risk    As you are discharged from this [facility] facility, it is important to learn how to keep safe from harming yourself.    Recognize the warning signs:  · Abrupt changes in personality, positive or negative- including increase in energy   · Giving away possessions  · Change in eating patterns- significant weight changes-  positive or negative  · Change in sleeping patterns- unable to sleep or sleeping all the time   · Unwillingness or inability to communicate  · Depression  · Unusual sadness, discouragement and loneliness  · Talk of wanting to die  · Neglect of personal appearance   · Rebelliousness- reckless behavior  · Withdrawal from people/activities they love  · Confusion- inability to concentrate     If you or a loved one observes any of these behaviors or has concerns about self-harm, here's what you can do:  · Talk about it- your feelings and reasons for harming yourself  · Remove any means that you might use to hurt yourself (examples: pills, rope, extension cords, firearm)  · Get professional help from the community (Mental Health, Substance Abuse, psychological counseling)  · Do not be alone:Call your Safe Contact- someone whom you trust who will be there for you.  · Call your local  CRISIS HOTLINE 358-6888 or 444-444-5491  · Call your local Children's Mobile Crisis Response Team [place] (505) 614-2015 or www.[employer]  · Call the toll free National Suicide Prevention Hotlines   · National Suicide Prevention Lifeline 500-815-RYLZ (3173)  · National [employer] Line Network 800-SUICIDE (925-6410)

## 2017-11-15 NOTE — DISCHARGE SUMMARY
CHIEF COMPLAINT ON ADMISSION  Chief Complaint   Patient presents with   • ETOH Withdrawal     Last drink last night, unknown time. States she wants to detox. 1/5 vodka per day. Tremulous.    • Seizure     4 minute seizure witnessed by        CODE STATUS  Full code.     HPI & HOSPITAL COURSE  Please see original H&P for specific information, patient was admitted due to acute alcohol withdrawal with seizure, she was started on ciwa protocol, ivf, mtv, thiamine, folic acid, electrolytes abnormalities supplementing, patient today she is a bit better, her LFT's improving, no more seizures, she is alert and oriented, patient wants to go home today and she is willing to sign AMA if needed, patient was explained that I would rather have her stay for at least 1 more day but she refused and she stated that she will go today, she had chronic pancytopenia which seems to be stable, LFT's improved, her CIWA score between 2-5, since patient does not want to stay and she most probably will sign AMA I think that for the best interest for patient she will benefit from formal d/c with prescriptions and with close f/u with PCP, patient was advised to stop drinking alcohol she was offered AA/adiction services information but she stated that she has all that information, her phosphorus is low she was started on po supplement, she was asked to check her phosphorus levels in 3-5 days, written prescription was given for that, patient will be d/c today , she will f/u with pcp, she needs to stop drinking she was counseled about all complications that she could have if she does not stop drinking, prescription for medication given she will continue on librium for 1 more day,her mtv, phosphorus po, thiamine and folic acid supplement.   Patient expressed understanding of her d/c plan and agreed with it    Therefore, she is discharged in fair and stable condition with close outpatient follow-up.    SPECIFIC OUTPATIENT FOLLOW-UP  PCP in 1  week.     DISCHARGE PROBLEM LIST  Active Problems:    Pancytopenia (CMS-HCC) POA: Yes    Hyperbilirubinemia POA: Yes    Macrocytic anemia POA: Unknown  Resolved Problems:    Alcohol withdrawal (CMS-HCC) POA: Yes    Hypokalemia POA: Yes    QT prolongation POA: Unknown      FOLLOW UP  No future appointments.  PCP in 1 week.       MEDICATIONS ON DISCHARGE   Zari Day   [facility] Medication Instructions KILEY:[id number]    Printed on:11/14/17 9489   Medication Information                      chlordiazepoxide (LIBRIUM) 25 MG Cap  Take 1 Cap by mouth every 6 hours for 1 day.             cyclobenzaprine (FLEXERIL) 10 MG Tab  Take 10 mg by mouth 3 times a day as needed for Muscle Spasms.             folic acid (FOLVITE) 1 MG Tab  Take 1 Tab by mouth every day.             gabapentin (NEURONTIN) 100 MG Cap  Take 1 Cap by mouth every bedtime.             lorazepam (ATIVAN) 0.5 MG Tab  Take 1 Tab by mouth every four hours as needed for Anxiety.             magnesium oxide (MAG-OX) 400 (241.3 Mg) MG Tab tablet  Take 1 Tab by mouth 2 Times a Day for 7 days.             multivitamin (THERAGRAN) Tab  Take 1 Tab by mouth every day.             phosphorus (K-PHOS-NEUTRAL, PHOSPHA 250 NEUTRAL) 155-852-130 MG tablet  Take 1 Tab by mouth every 6 hours for 7 days.             thiamine (THIAMINE) 100 MG tablet  Take 1 Tab by mouth every day.                 DIET  General diet.     ACTIVITY  As tolerated.  Weight bearing as tolerated      CONSULTATIONS  none    PROCEDURES  None.     LABORATORY  Lab Results   Component Value Date/Time    SODIUM 140 11/14/2017 04:05 AM    POTASSIUM 3.7 11/14/2017 04:05 AM    CHLORIDE 111 11/14/2017 04:05 AM    CO2 23 11/14/2017 04:05 AM    GLUCOSE 90 11/14/2017 04:05 AM    BUN 6 (L) 11/14/2017 04:05 AM    CREATININE 0.56 11/14/2017 04:05 AM    CREATININE 0.9 10/10/2006 07:30 PM        Lab Results   Component Value Date/Time    WBC 1.6 (LL) 11/14/2017 04:05 AM    HEMOGLOBIN 9.1 (L) 11/14/2017 04:05 AM     HEMATOCRIT 28.8 (L) 11/14/2017 04:05 AM    PLATELETCT 40 (LL) 11/14/2017 04:05 AM        Total time of the discharge process exceeds 45 minutes

## 2017-11-16 LAB — FOLATE RBC-MCNC: 598 NG/ML

## 2018-01-25 ENCOUNTER — OFFICE VISIT (OUTPATIENT)
Dept: MEDICAL GROUP | Facility: PHYSICIAN GROUP | Age: 53
End: 2018-01-25
Payer: COMMERCIAL

## 2018-01-25 VITALS
HEIGHT: 68 IN | TEMPERATURE: 98.2 F | BODY MASS INDEX: 24.25 KG/M2 | DIASTOLIC BLOOD PRESSURE: 80 MMHG | OXYGEN SATURATION: 100 % | WEIGHT: 160 LBS | HEART RATE: 83 BPM | SYSTOLIC BLOOD PRESSURE: 118 MMHG

## 2018-01-25 DIAGNOSIS — M25.542 ARTHRALGIA OF BOTH HANDS: ICD-10-CM

## 2018-01-25 DIAGNOSIS — F10.11 MILD ALCOHOL ABUSE IN EARLY REMISSION: ICD-10-CM

## 2018-01-25 DIAGNOSIS — M25.541 ARTHRALGIA OF BOTH HANDS: ICD-10-CM

## 2018-01-25 DIAGNOSIS — Z87.828 HISTORY OF HEAD INJURY: ICD-10-CM

## 2018-01-25 DIAGNOSIS — F10.20 ALCOHOL ABUSE WITH PHYSIOLOGICAL DEPENDENCE (HCC): ICD-10-CM

## 2018-01-25 DIAGNOSIS — N60.01 SOLITARY CYST OF RIGHT BREAST: ICD-10-CM

## 2018-01-25 PROCEDURE — 99214 OFFICE O/P EST MOD 30 MIN: CPT | Performed by: NURSE PRACTITIONER

## 2018-01-25 NOTE — ASSESSMENT & PLAN NOTE
Stopped drinking 90 days ago. Saw substance abuse counselor.    Was hospitalized November 17.  States that 7 days after discharge and with withdrawal had an episode of numbness/weakness, slurred speech.  No residual symptoms at this time.  Having some difficulty with short term memory, and word recall.

## 2018-01-25 NOTE — ASSESSMENT & PLAN NOTE
Aching joints in the morning.  Daughter with Lupus.  Pain improves as day goes on.  States she had a + RF years ago.  Ibuprofen 800 mg helps. Will order RF, CCP, CLAUDIO

## 2018-01-25 NOTE — PROGRESS NOTES
Zari Day is a 52 y.o. white female here today to establish care and for evaluation and management of:    HPI:  anxious  Alcohol abuse with physiological dependence (CMS-HCC)  Stopped drinking 90 days ago. Saw substance abuse counselor.    Was hospitalized November 17.  States that 7 days after discharge and with withdrawal had an episode of numbness/weakness, slurred speech.  No residual symptoms at this time.  Having some difficulty with short term memory, and word recall.     Arthralgia of both hands  Aching joints in the morning.  Daughter with Lupus.  Pain improves as day goes on.  States she had a + RF years ago.  Ibuprofen 800 mg helps. Will order RF, CCP, CLAUDIO      Current medicines (including changes today)  Current Outpatient Prescriptions   Medication Sig Dispense Refill   • thiamine (THIAMINE) 100 MG tablet Take 1 Tab by mouth every day. 30 Tab 0   • multivitamin (THERAGRAN) Tab Take 1 Tab by mouth every day. 30 Tab 0   • folic acid (FOLVITE) 1 MG Tab Take 1 Tab by mouth every day. 30 Tab 0   • lorazepam (ATIVAN) 0.5 MG Tab Take 1 Tab by mouth every four hours as needed for Anxiety. 10 Tab 0   • gabapentin (NEURONTIN) 100 MG Cap Take 1 Cap by mouth every bedtime. 30 Cap 0   • cyclobenzaprine (FLEXERIL) 10 MG Tab Take 10 mg by mouth 3 times a day as needed for Muscle Spasms.       No current facility-administered medications for this visit.        She  has a past medical history of Alcoholic hepatitis; ETOH abuse; and Thrombocytopenia (CMS-HCC).    She  has no past surgical history on file.    Social History   Substance Use Topics   • Smoking status: Never Smoker   • Smokeless tobacco: Never Used   • Alcohol use No      Comment: stopped 90 days ago       Social History     Social History Narrative   • No narrative on file       Family History   Problem Relation Age of Onset   • Hypertension Mother    • Cancer Mother      breast   • Alcohol/Drug Mother    • Hypertension Father    • Alcohol/Drug  "Father        Family Status   Relation Status   • Mother    • Father          ROS  As stated in hpi  All other systems reviewed and are negative     Objective:     Blood pressure 118/80, pulse 83, temperature 36.8 °C (98.2 °F), height 1.727 m (5' 8\"), weight 72.6 kg (160 lb), SpO2 100 %, not currently breastfeeding. Body mass index is 24.33 kg/m².  Physical Exam:    Constitutional: Alert, no distress.  Skin: Warm, dry, good turgor, no rashes in visible areas. Small 5 mm X 5 mm cyst lesion over right temple, no erythema, no pain, movable.   Eye: Equal, round and reactive, conjunctiva clear, lids normal.  ENMT: Lips without lesions, good dentition, oropharynx clear.  Neck: Trachea midline, no masses, no thyromegaly. No cervical or supraclavicular lymphadenopathy.  Respiratory: Unlabored respiratory effort, lungs clear to auscultation, no wheezes, no ronchi.  Cardiovascular: Normal S1, S2, no murmur, no edema.  Abdomen: Soft, non-tender, no masses, no hepatosplenomegaly.  Psych: Alert and oriented x3, normal affect and mood. anxious        Assessment and Plan:   The following treatment plan was discussed    1. Alcohol abuse with physiological dependence (CMS-HCC)  This is a new problem to me.  Was hospitalized 11/17 and was medically detoxed.  No drinking for 90 days now.  Will recheck labs.  Concerned over word recall and short term memory loss, would like to see neuro.  No abdominal pain, icterus reported.  RTC in 6 months.  Continue with Counseling.  Recommended AA program.   - CBC WITH DIFFERENTIAL; Future  - COMP METABOLIC PANEL; Future  - LIPID PROFILE; Future  - TSH WITH REFLEX TO FT4; Future  - VITAMIN D,25 HYDROXY; Future  - REFERRAL TO NEUROLOGY    2. History of head injury  This is a new problem to me.  Chronic.  Exacerbated by alcohol abuse history.  Referral to neurology as MRI of brain 6/17 showed some cortical atrophy. Monitor   - REFERRAL TO NEUROLOGY    3. Arthralgia of both hands  This is a new " problem to me.  Chronic.  Unknown etiology.  Hx of + RF in the past.  Labs ordered, monitor and follow.   - CLAUDIO ANTIBODY WITH REFLEX; Future  - CCP ANTIBODY; Future  - RHEUMATOID ARTHRITIS FACTOR; Future    4. Solitary cyst of right temple  This is a new problem to me.  Inclusion, movable cyst.  Reassured.  Monitor and follow.       Records requested.  Followup: Return in about 6 months (around 7/25/2018) for follow up alcohol abuse in remission.

## 2018-02-07 ENCOUNTER — HOSPITAL ENCOUNTER (OUTPATIENT)
Dept: LAB | Facility: MEDICAL CENTER | Age: 53
End: 2018-02-07
Attending: NURSE PRACTITIONER
Payer: COMMERCIAL

## 2018-02-07 DIAGNOSIS — M25.542 ARTHRALGIA OF BOTH HANDS: ICD-10-CM

## 2018-02-07 DIAGNOSIS — F10.20 ALCOHOL ABUSE WITH PHYSIOLOGICAL DEPENDENCE (HCC): ICD-10-CM

## 2018-02-07 DIAGNOSIS — M25.541 ARTHRALGIA OF BOTH HANDS: ICD-10-CM

## 2018-02-07 LAB
25(OH)D3 SERPL-MCNC: 17 NG/ML (ref 30–100)
ALBUMIN SERPL BCP-MCNC: 4.7 G/DL (ref 3.2–4.9)
ALBUMIN/GLOB SERPL: 1.7 G/DL
ALP SERPL-CCNC: 69 U/L (ref 30–99)
ALT SERPL-CCNC: 14 U/L (ref 2–50)
ANION GAP SERPL CALC-SCNC: 7 MMOL/L (ref 0–11.9)
AST SERPL-CCNC: 24 U/L (ref 12–45)
BASOPHILS # BLD AUTO: 5.3 % (ref 0–1.8)
BASOPHILS # BLD: 0.16 K/UL (ref 0–0.12)
BILIRUB SERPL-MCNC: 1 MG/DL (ref 0.1–1.5)
BUN SERPL-MCNC: 19 MG/DL (ref 8–22)
CALCIUM SERPL-MCNC: 10 MG/DL (ref 8.5–10.5)
CHLORIDE SERPL-SCNC: 108 MMOL/L (ref 96–112)
CHOLEST SERPL-MCNC: 188 MG/DL (ref 100–199)
CO2 SERPL-SCNC: 24 MMOL/L (ref 20–33)
CREAT SERPL-MCNC: 0.9 MG/DL (ref 0.5–1.4)
EOSINOPHIL # BLD AUTO: 0.08 K/UL (ref 0–0.51)
EOSINOPHIL NFR BLD: 2.6 % (ref 0–6.9)
ERYTHROCYTE [DISTWIDTH] IN BLOOD BY AUTOMATED COUNT: 42.2 FL (ref 35.9–50)
GLOBULIN SER CALC-MCNC: 2.7 G/DL (ref 1.9–3.5)
GLUCOSE SERPL-MCNC: 91 MG/DL (ref 65–99)
HCT VFR BLD AUTO: 35.1 % (ref 37–47)
HDLC SERPL-MCNC: 50 MG/DL
HGB BLD-MCNC: 10.6 G/DL (ref 12–16)
LDLC SERPL CALC-MCNC: 123 MG/DL
LG PLATELETS BLD QL SMEAR: NORMAL
LYMPHOCYTES # BLD AUTO: 0.89 K/UL (ref 1–4.8)
LYMPHOCYTES NFR BLD: 29.8 % (ref 22–41)
MANUAL DIFF BLD: NORMAL
MCH RBC QN AUTO: 24.5 PG (ref 27–33)
MCHC RBC AUTO-ENTMCNC: 29.6 G/DL (ref 33.6–35)
MCV RBC AUTO: 82.8 FL (ref 81.4–97.8)
MONOCYTES # BLD AUTO: 0.13 K/UL (ref 0–0.85)
MONOCYTES NFR BLD AUTO: 4.4 % (ref 0–13.4)
MORPHOLOGY BLD-IMP: NORMAL
NEUTROPHILS # BLD AUTO: 1.74 K/UL (ref 2–7.15)
NEUTROPHILS NFR BLD: 57.9 % (ref 44–72)
NRBC # BLD AUTO: 0 K/UL
NRBC BLD-RTO: 0 /100 WBC
OVALOCYTES BLD QL SMEAR: NORMAL
PLATELET # BLD AUTO: 89 K/UL (ref 164–446)
PLATELET BLD QL SMEAR: NORMAL
PMV BLD AUTO: 11.4 FL (ref 9–12.9)
POIKILOCYTOSIS BLD QL SMEAR: NORMAL
POLYCHROMASIA BLD QL SMEAR: NORMAL
POTASSIUM SERPL-SCNC: 3.9 MMOL/L (ref 3.6–5.5)
PROT SERPL-MCNC: 7.4 G/DL (ref 6–8.2)
RBC # BLD AUTO: 4.24 M/UL (ref 4.2–5.4)
RBC BLD AUTO: PRESENT
RHEUMATOID FACT SER IA-ACNC: 17 IU/ML (ref 0–14)
SODIUM SERPL-SCNC: 139 MMOL/L (ref 135–145)
TRIGL SERPL-MCNC: 73 MG/DL (ref 0–149)
TSH SERPL DL<=0.005 MIU/L-ACNC: 3.44 UIU/ML (ref 0.38–5.33)
WBC # BLD AUTO: 3 K/UL (ref 4.8–10.8)

## 2018-02-07 PROCEDURE — 86431 RHEUMATOID FACTOR QUANT: CPT

## 2018-02-07 PROCEDURE — 82306 VITAMIN D 25 HYDROXY: CPT

## 2018-02-07 PROCEDURE — 86235 NUCLEAR ANTIGEN ANTIBODY: CPT | Mod: 91

## 2018-02-07 PROCEDURE — 80053 COMPREHEN METABOLIC PANEL: CPT

## 2018-02-07 PROCEDURE — 85007 BL SMEAR W/DIFF WBC COUNT: CPT

## 2018-02-07 PROCEDURE — 84443 ASSAY THYROID STIM HORMONE: CPT

## 2018-02-07 PROCEDURE — 36415 COLL VENOUS BLD VENIPUNCTURE: CPT

## 2018-02-07 PROCEDURE — 86200 CCP ANTIBODY: CPT

## 2018-02-07 PROCEDURE — 85027 COMPLETE CBC AUTOMATED: CPT

## 2018-02-07 PROCEDURE — 86038 ANTINUCLEAR ANTIBODIES: CPT

## 2018-02-07 PROCEDURE — 80061 LIPID PANEL: CPT

## 2018-02-07 PROCEDURE — 86225 DNA ANTIBODY NATIVE: CPT

## 2018-02-09 LAB — CCP IGG SERPL-ACNC: 4 UNITS (ref 0–19)

## 2018-02-11 LAB
DSDNA AB TITR SER CLIF: ABNORMAL {TITER}
ENA SM IGG SER-ACNC: 0 AU/ML (ref 0–40)
ENA SS-B IGG SER IA-ACNC: 0 AU/ML (ref 0–40)
NUCLEAR IGG SER QL IA: DETECTED
NUCLEAR IGG TITR SER IF: ABNORMAL {TITER}
SSA52 R0ENA AB IGG Q0420: 4 AU/ML (ref 0–40)
SSA60 R0ENA AB IGG Q0419: 0 AU/ML (ref 0–40)
U1 SNRNP IGG SER QL: 0 AU/ML (ref 0–40)

## 2018-02-13 ENCOUNTER — PATIENT MESSAGE (OUTPATIENT)
Dept: MEDICAL GROUP | Facility: PHYSICIAN GROUP | Age: 53
End: 2018-02-13

## 2018-02-13 DIAGNOSIS — R76.8 POSITIVE ANA (ANTINUCLEAR ANTIBODY): ICD-10-CM

## 2018-02-19 ENCOUNTER — PATIENT MESSAGE (OUTPATIENT)
Dept: MEDICAL GROUP | Facility: PHYSICIAN GROUP | Age: 53
End: 2018-02-19

## 2018-02-19 ENCOUNTER — HOSPITAL ENCOUNTER (OUTPATIENT)
Dept: RADIOLOGY | Facility: MEDICAL CENTER | Age: 53
End: 2018-02-19
Attending: NURSE PRACTITIONER
Payer: COMMERCIAL

## 2018-02-19 DIAGNOSIS — R76.8 POSITIVE ANA (ANTINUCLEAR ANTIBODY): ICD-10-CM

## 2018-02-19 PROCEDURE — 77077 JOINT SURVEY SINGLE VIEW: CPT

## 2018-02-23 ENCOUNTER — HOSPITAL ENCOUNTER (OUTPATIENT)
Dept: RADIOLOGY | Facility: MEDICAL CENTER | Age: 53
End: 2018-02-23
Attending: OBSTETRICS & GYNECOLOGY
Payer: COMMERCIAL

## 2018-02-23 DIAGNOSIS — Z12.31 ENCOUNTER FOR SCREENING MAMMOGRAM FOR BREAST CANCER: ICD-10-CM

## 2018-02-23 PROCEDURE — 77067 SCR MAMMO BI INCL CAD: CPT

## 2018-02-28 ENCOUNTER — PATIENT MESSAGE (OUTPATIENT)
Dept: MEDICAL GROUP | Facility: PHYSICIAN GROUP | Age: 53
End: 2018-02-28

## 2018-02-28 DIAGNOSIS — R76.8 RHEUMATOID FACTOR POSITIVE: ICD-10-CM

## 2018-03-07 PROBLEM — R92.8 ABNORMAL MAMMOGRAM OF RIGHT BREAST: Status: ACTIVE | Noted: 2018-03-07

## 2018-03-09 ENCOUNTER — TELEPHONE (OUTPATIENT)
Dept: RADIOLOGY | Facility: MEDICAL CENTER | Age: 53
End: 2018-03-09

## 2018-03-12 ENCOUNTER — HOSPITAL ENCOUNTER (OUTPATIENT)
Dept: RADIOLOGY | Facility: MEDICAL CENTER | Age: 53
End: 2018-03-12
Attending: OBSTETRICS & GYNECOLOGY
Payer: COMMERCIAL

## 2018-03-12 DIAGNOSIS — R92.8 ABNORMAL MAMMOGRAM OF RIGHT BREAST: ICD-10-CM

## 2018-03-12 PROCEDURE — 76642 ULTRASOUND BREAST LIMITED: CPT | Mod: RT

## 2018-03-12 PROCEDURE — 77065 DX MAMMO INCL CAD UNI: CPT | Mod: RT

## 2018-03-14 ENCOUNTER — PATIENT MESSAGE (OUTPATIENT)
Dept: MEDICAL GROUP | Facility: PHYSICIAN GROUP | Age: 53
End: 2018-03-14

## 2018-03-14 RX ORDER — PREDNISONE 10 MG/1
TABLET ORAL
Qty: 30 TAB | Refills: 1 | Status: SHIPPED | OUTPATIENT
Start: 2018-03-14 | End: 2018-07-25

## 2018-03-21 ENCOUNTER — PATIENT MESSAGE (OUTPATIENT)
Dept: MEDICAL GROUP | Facility: PHYSICIAN GROUP | Age: 53
End: 2018-03-21

## 2018-03-21 DIAGNOSIS — M05.742 RHEUMATOID ARTHRITIS INVOLVING BOTH HANDS WITH POSITIVE RHEUMATOID FACTOR (HCC): ICD-10-CM

## 2018-03-21 DIAGNOSIS — M05.741 RHEUMATOID ARTHRITIS INVOLVING BOTH HANDS WITH POSITIVE RHEUMATOID FACTOR (HCC): ICD-10-CM

## 2018-03-21 DIAGNOSIS — M25.541 ARTHRALGIA OF BOTH HANDS: ICD-10-CM

## 2018-03-21 DIAGNOSIS — M25.542 ARTHRALGIA OF BOTH HANDS: ICD-10-CM

## 2018-03-27 ENCOUNTER — PATIENT MESSAGE (OUTPATIENT)
Dept: MEDICAL GROUP | Facility: PHYSICIAN GROUP | Age: 53
End: 2018-03-27

## 2018-03-27 RX ORDER — FOLIC ACID 1 MG/1
1 TABLET ORAL DAILY
Qty: 90 TAB | Refills: 3 | Status: ON HOLD | OUTPATIENT
Start: 2018-03-27 | End: 2019-03-07

## 2018-05-11 ENCOUNTER — PATIENT MESSAGE (OUTPATIENT)
Dept: MEDICAL GROUP | Facility: PHYSICIAN GROUP | Age: 53
End: 2018-05-11

## 2018-05-15 ENCOUNTER — PATIENT MESSAGE (OUTPATIENT)
Dept: MEDICAL GROUP | Facility: PHYSICIAN GROUP | Age: 53
End: 2018-05-15

## 2018-05-15 DIAGNOSIS — M25.521 ARTHRALGIA OF BOTH ELBOWS: ICD-10-CM

## 2018-05-15 DIAGNOSIS — M25.522 ARTHRALGIA OF BOTH ELBOWS: ICD-10-CM

## 2018-05-15 NOTE — TELEPHONE ENCOUNTER
From: Zari Day  To: EVAN Alicia  Sent: 5/15/2018 7:34 AM PDT  Subject: Prescription Question    I did not ask for Narcotics. Can you refer me to PT or is this also the responsibility of my Rhuemetologist?  ----- Message -----  From: EVAN Alicia  Sent: 5/14/2018 8:54 AM PDT  To: Zari Day  Subject: RE: Prescription Question  Zari Lozano, I have not received Dr. Novoa's records. You would need to work through your rheumatology office for any pain medication associated with your lupus. Ice, Heat are both helpful along with some of the over the counter creams such as capsacian cream. They help with pain. I am not able to prescribe a narcotic for this, you would, again, need to go through rheumatology  EVAN Alicia      ----- Message -----   From: Zari Day   Sent: 5/11/2018 11:57 AM PDT   To: EVAN Alicia  Subject: Prescription Question    I hope you have recieved my medical records from Dr. Novoa. If you have not I was diagnosed with Lupus and am currently started on medication. It takes several weeks to work and the pain in my elbows is debilitating. Is there anything, not steroidal, that I can take for pain? I have tried Advil and Aleve, neither touches the pain or inflamation. Also can u refer me to PT for this.

## 2018-05-16 NOTE — TELEPHONE ENCOUNTER
From: Zari Day  To: EVAN Alicia  Sent: 5/15/2018 2:52 PM PDT  Subject: Prescription Question    The only prference I have is location. I apologize I dont know anything about where to go for PT. I guess the closest therapist to the [facility] on [address], in [place]?? Please let me know if there is anything else you may need from me. Can you give me a prescription for Ladocane patches?     If there is anything else with respect to PT please let me know. Thanks,    Zari  ----- Message -----  From: EVAN Alicia  Sent: 5/15/2018 12:19 PM PDT  To: Zari Day  Subject: RE: Prescription Question  I can certainly get you over to a physical therapy office. Do you have a preference? Let me know  EVAN Alicia      ----- Message -----   From: Zari Day   Sent: 5/15/2018 7:34 AM PDT   To: EVAN Alicia  Subject: Prescription Question    I did not ask for Narcotics. Can you refer me to PT or is this also the responsibility of my Rhuemetologist?  ----- Message -----  From: EVAN Alicia  Sent: 5/14/2018 8:54 AM PDT  To: Zari Day  Subject: RE: Prescription Question  Zari  Unfortunately, I have not received Dr. Novoa's records. You would need to work through your rheumatology office for any pain medication associated with your lupus. Ice, Heat are both helpful along with some of the over the counter creams such as capsacian cream. They help with pain. I am not able to prescribe a narcotic for this, you would, again, need to go through rheumatology  EVAN Alicia      ----- Message -----   From: Zari Day   Sent: 5/11/2018 11:57 AM PDT   To: EVAN Alicia  Subject: Prescription Question    I hope you have recieved my medical records from Dr. Novoa. If you have not I was diagnosed with Lupus and am currently started on medication. It takes several weeks to work and the pain in my elbows is debilitating. Is  there anything, not steroidal, that I can take for pain? I have tried Advil and Aleve, neither touches the pain or inflamation. Also can u refer me to PT for this.

## 2018-06-06 ENCOUNTER — PHYSICAL THERAPY (OUTPATIENT)
Dept: PHYSICAL THERAPY | Facility: REHABILITATION | Age: 53
End: 2018-06-06
Attending: NURSE PRACTITIONER
Payer: COMMERCIAL

## 2018-06-06 DIAGNOSIS — M25.522 ARTHRALGIA OF BOTH ELBOWS: ICD-10-CM

## 2018-06-06 DIAGNOSIS — M25.521 ARTHRALGIA OF BOTH ELBOWS: ICD-10-CM

## 2018-06-06 PROCEDURE — 97163 PT EVAL HIGH COMPLEX 45 MIN: CPT

## 2018-06-06 PROCEDURE — 97112 NEUROMUSCULAR REEDUCATION: CPT

## 2018-06-06 ASSESSMENT — ENCOUNTER SYMPTOMS
PAIN TIMING: WHEN ACTIVE
ALLEVIATING FACTORS: COLD/ICE
AWAKENINGS PER NIGHT: 4
PAIN SCALE AT LOWEST: 7
EXACERBATED BY: CARRYING
EXACERBATED BY: GRIPPING
QUALITY: BURNING
ALLEVIATING FACTORS: HEAT APPLICATION
ALLEVIATING FACTORS: SLEEPING
QUALITY: STABBING
EXACERBATED BY: LIFTING
QUALITY: SHOOTING
PAIN SCALE: 7
ALLEVIATING FACTORS: REST
EXACERBATED BY: HOUSEWORK
PAIN SCALE AT HIGHEST: 10
ALLEVIATING FACTORS: STRETCHING
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
## 2018-06-08 ENCOUNTER — PHYSICAL THERAPY (OUTPATIENT)
Dept: PHYSICAL THERAPY | Facility: REHABILITATION | Age: 53
End: 2018-06-08
Attending: NURSE PRACTITIONER
Payer: COMMERCIAL

## 2018-06-08 DIAGNOSIS — M25.522 ARTHRALGIA OF BOTH ELBOWS: ICD-10-CM

## 2018-06-08 DIAGNOSIS — M25.521 ARTHRALGIA OF BOTH ELBOWS: ICD-10-CM

## 2018-06-08 PROCEDURE — 97110 THERAPEUTIC EXERCISES: CPT

## 2018-06-08 NOTE — OP THERAPY DAILY TREATMENT
Outpatient Physical Therapy  DAILY TREATMENT     [facility] [place]  [address]  [facility] [address]  Phone:  [phone]  Fax:  [phone]    Date: 06/08/2018    Patient: Zari Day  YOB: 1965  MRN: [id number]     Time Calculation  Start time: 0945  Stop time: 1015 Time Calculation (min): 30 minutes     Chief Complaint:  B elbow pain    Visit #: 2    SUBJECTIVE:   Pt notes she did not like the taping and she has been doing her stretches every day. Notes no improvement so far.     OBJECTIVE:  Current objective measures:  dynamometer:  45 R 35 L 5/10 pain with  testing. Wrist extension painful bilateral L>R.   ROM WNL.         Therapeutic Exercises (CPT 92886):     1. Eccentrc loading wrsit extesors , 10 each   2#    2. Wrist flexors , 10 each 2#    3. Supination and pronation with dowel, 50 loading of wrist extension through range    4. Stretches of wrist extension, 30sec x 3 each.     5. Squeezing , x2min    6. Theraband extension, lvl 0 x 15 reps.       Time-based treatments/modalities:  Therapeutic exercise minutes (CPT 69982): 30 minutes       Pain rating before treatment: 5  Pain rating after treatment: 5    ASSESSMENT:   Response to treatment: Overall suspect symptoms to be more systemic related but bilateral wrist extensors painful to loading which could be related to chronic tendonitis. Continued loading of wrist musculature to continue.      PLAN/RECOMMENDATIONS:   Plan for treatment: therapy treatment to continue next visit.  Planned interventions for next visit: continue with current treatment.

## 2018-06-13 ENCOUNTER — PHYSICAL THERAPY (OUTPATIENT)
Dept: PHYSICAL THERAPY | Facility: REHABILITATION | Age: 53
End: 2018-06-13
Attending: NURSE PRACTITIONER
Payer: COMMERCIAL

## 2018-06-13 DIAGNOSIS — M25.522 ARTHRALGIA OF BOTH ELBOWS: ICD-10-CM

## 2018-06-13 DIAGNOSIS — M25.521 ARTHRALGIA OF BOTH ELBOWS: ICD-10-CM

## 2018-06-13 PROCEDURE — 97110 THERAPEUTIC EXERCISES: CPT

## 2018-06-13 NOTE — OP THERAPY DAILY TREATMENT
Outpatient Physical Therapy  DAILY TREATMENT     [facility] [place]  [address]  [facility] [address]  Phone:  [phone]  Fax:  [phone]    Date: 06/13/2018    Patient: Zari Day  YOB: 1965  MRN: [id number]     Time Calculation  Start time: 0945  Stop time: 1015 Time Calculation (min): 30 minutes     Chief Complaint:  B elbow pain    Visit #: 3    SUBJECTIVE:   Pt notes 25% improvement since last visit. Pt brought in theraband  wand and was wondering if she could use it.  Feels that she can handle a stronger band and that overall she is stronger.    OBJECTIVE:  Current objective measures:  dynamometer:  55 R 40 L 5/10 pain with  testing. Wrist extension painful bilateral L>R.   ROM WNL.          Therapeutic Exercises (CPT 17176):     1. Eccentrc loading wrsit extesors , 10 each  3#    2. Wrist flexors , 10 each 3#    3. Supination and pronation with dowel, 50 loading of wrist extension through range    4. Stretches of wrist extension, x5min supine with nerve glide motion    5. Squeezing , x2min    6. Theraband extension, lvl 1 (orange) x 15 reps.     7. UBE, lvl 2 x 5min    8.  Us and n's with pts wand yellow resistance, x 15      Time-based treatments/modalities:  Therapeutic exercise minutes (CPT 36313): 30 minutes       Pain rating before treatment: 5  Pain rating after treatment: 5 with gripping    ASSESSMENT:   Response to treatment: Overall improve activity tolerance and improved strength noted as above.  Continued loading of wrist musculature to continue slowly.      PLAN/RECOMMENDATIONS:   Plan for treatment: therapy treatment to continue next visit.  Planned interventions for next visit: continue with current treatment.

## 2018-06-15 ENCOUNTER — PHYSICAL THERAPY (OUTPATIENT)
Dept: PHYSICAL THERAPY | Facility: REHABILITATION | Age: 53
End: 2018-06-15
Attending: NURSE PRACTITIONER
Payer: COMMERCIAL

## 2018-06-15 DIAGNOSIS — M25.522 ARTHRALGIA OF BOTH ELBOWS: ICD-10-CM

## 2018-06-15 DIAGNOSIS — M25.521 ARTHRALGIA OF BOTH ELBOWS: ICD-10-CM

## 2018-06-15 PROCEDURE — 97110 THERAPEUTIC EXERCISES: CPT

## 2018-06-15 NOTE — OP THERAPY DAILY TREATMENT
Outpatient Physical Therapy  DAILY TREATMENT     [facility] [place]  [address]  [facility] [facility] [address]  Phone:  [phone]  Fax:  [phone]    Date: 06/15/2018    Patient: Zari Day  YOB: 1965  MRN: [id number]     Time Calculation  Start time: 1020  Stop time: 1055 Time Calculation (min): 35 minutes     Chief Complaint:  B elbow pain    Visit #: 4    SUBJECTIVE:   Pt notes  Continued improvement. Pt feels like she is getting stronger and that she can do more with her arms. Pain is still worse in her L elbow compared to her R.     OBJECTIVE:  Current objective measures:  dynamometer: TBA next visit: Last visit pt had:  55 R 40 L 5/10 pain with  testing. Wrist extension painful bilateral L>R.   ROM WNL.          Therapeutic Exercises (CPT 20158):     1. UBE, lvl 2 x 6min    2. Wrist flexors , 10 each 3#    3. Supination and pronation with dowel, 50 loading of wrist extension through range    4. Stretches of wrist extension, x5min supine with nerve glide motion    5. Shoulder/arm combined extension banded, orange-white with changes in pronation and supinatiaon x 15 reps 4 sets total eccentric    6. Theraband extension, lvl 1 (orange) x 15 reps.     7.  Us and n's with pts wand yellow resistance, review with HEP 1min    Therapeutic Treatments and Modalities:     1. Manual Therapy (CPT 63236), STM wrist extensors Dany     Time-based treatments/modalities:  Manual therapy minutes (CPT 48146): 5 minutes  Therapeutic exercise minutes (CPT 77257): 25 minutes       Pain rating before treatment: 4  Pain rating after treatment: not assessed    ASSESSMENT:   Response to treatment: Overall improve activity tolerance and improved strength noted as above.  Continued loading of wrist musculature to continue slowly.       PLAN/RECOMMENDATIONS:   Plan for treatment: therapy treatment to continue next visit.  Planned interventions for next visit: continue with  current treatment.

## 2018-06-20 ENCOUNTER — PHYSICAL THERAPY (OUTPATIENT)
Dept: PHYSICAL THERAPY | Facility: REHABILITATION | Age: 53
End: 2018-06-20
Attending: NURSE PRACTITIONER
Payer: COMMERCIAL

## 2018-06-20 DIAGNOSIS — M25.522 ARTHRALGIA OF BOTH ELBOWS: ICD-10-CM

## 2018-06-20 DIAGNOSIS — M25.521 ARTHRALGIA OF BOTH ELBOWS: ICD-10-CM

## 2018-06-20 PROCEDURE — 97110 THERAPEUTIC EXERCISES: CPT

## 2018-06-20 NOTE — OP THERAPY DAILY TREATMENT
Outpatient Physical Therapy  DAILY TREATMENT     [facility] [place]  [address]  [place] [facility] [address]  Phone:  [phone]  Fax:  [phone]    Date: 06/20/2018    Patient: Zari Day  YOB: 1965  MRN: [id number]     Time Calculation  Start time: 1015  Stop time: 1045 Time Calculation (min): 30 minutes     Chief Complaint:  B elbow pain    Visit #: 5    SUBJECTIVE:   Pt notes that she had pain Saturday and Sunday after last visit due to cleaning and cooking a lot in the kitchen. denies increased pain after treatment on Friday.  Pain is still worse in her L elbow compared to her R.     OBJECTIVE:  Current objective measures:  dynamometer:  55 R 40 L 3/10 pain with  testing. Wrist extension painful MMT painful on L not on R.    ROM WNL.          Therapeutic Exercises (CPT 14835):     1. UBE, lvl 2 x 6min    2. Wrist flexors , 10 each 4#     3. Supination and pronation with dowel, 50 loading of wrist extension through range    4. Stretches of wrist extension, x5min supine with nerve glide motion    5. Shoulder/arm combined extension banded, orange-white with changes in pronation and supinatiaon x 15 reps 4 sets total eccentric    6. Wrist extension, 3# x10 each,     7.  Us and n's with pts wand yellow resistance, review with HEP 1min    Therapeutic Treatments and Modalities:     1. Manual Therapy (CPT 89982), STM wrist extensors Dany     Time-based treatments/modalities:  Manual therapy minutes (CPT 68128): 5 minutes  Therapeutic exercise minutes (CPT 94950): 25 minutes       ASSESSMENT:   Response to treatment: Overall improve activity tolerance. Pain lessened with maximal  strength maintained. Continued loading of wrist musculature to continue slowly. Progress to next level band with HEP next visit.        PLAN/RECOMMENDATIONS:   Plan for treatment: therapy treatment to continue next visit.  Planned interventions for next visit: continue with  current treatment.

## 2018-06-22 ENCOUNTER — APPOINTMENT (OUTPATIENT)
Dept: PHYSICAL THERAPY | Facility: REHABILITATION | Age: 53
End: 2018-06-22
Attending: NURSE PRACTITIONER
Payer: COMMERCIAL

## 2018-06-22 ENCOUNTER — OFFICE VISIT (OUTPATIENT)
Dept: NEUROLOGY | Facility: MEDICAL CENTER | Age: 53
End: 2018-06-22
Payer: COMMERCIAL

## 2018-06-22 VITALS
SYSTOLIC BLOOD PRESSURE: 122 MMHG | TEMPERATURE: 97.2 F | HEART RATE: 71 BPM | HEIGHT: 68 IN | OXYGEN SATURATION: 100 % | BODY MASS INDEX: 27.66 KG/M2 | RESPIRATION RATE: 16 BRPM | DIASTOLIC BLOOD PRESSURE: 60 MMHG | WEIGHT: 182.5 LBS

## 2018-06-22 DIAGNOSIS — E56.9 VITAMIN DEFICIENCY: ICD-10-CM

## 2018-06-22 DIAGNOSIS — G40.909 SEIZURE CEREBRAL (HCC): ICD-10-CM

## 2018-06-22 PROCEDURE — 99204 OFFICE O/P NEW MOD 45 MIN: CPT | Performed by: PSYCHIATRY & NEUROLOGY

## 2018-06-22 RX ORDER — ZONISAMIDE 100 MG/1
200 CAPSULE ORAL DAILY
Qty: 60 CAP | Refills: 4 | Status: SHIPPED | OUTPATIENT
Start: 2018-06-22 | End: 2018-10-29 | Stop reason: SDUPTHER

## 2018-06-22 RX ORDER — HYDROXYCHLOROQUINE SULFATE 200 MG/1
TABLET, FILM COATED ORAL
Refills: 2 | Status: ON HOLD | COMMUNITY
Start: 2018-05-11 | End: 2019-03-07
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
## 2018-06-28 ENCOUNTER — PHYSICAL THERAPY (OUTPATIENT)
Dept: PHYSICAL THERAPY | Facility: REHABILITATION | Age: 53
End: 2018-06-28
Attending: NURSE PRACTITIONER
Payer: COMMERCIAL

## 2018-06-28 DIAGNOSIS — M25.521 ARTHRALGIA OF BOTH ELBOWS: ICD-10-CM

## 2018-06-28 DIAGNOSIS — M25.522 ARTHRALGIA OF BOTH ELBOWS: ICD-10-CM

## 2018-06-28 PROCEDURE — 97110 THERAPEUTIC EXERCISES: CPT

## 2018-06-28 NOTE — OP THERAPY DAILY TREATMENT
"  Outpatient Physical Therapy  DAILY TREATMENT     [facility] Physical Therapy [place]  [address], [address]  [place] [facility] [address]  Phone:  [phone]  Fax:  [phone]    Date: 06/28/2018    Patient: Zari Day  YOB: 1965  MRN: [id number]     Time Calculation  Start time: 1115  Stop time: 1145 Time Calculation (min): 30 minutes     Chief Complaint:  B elbow pain    Visit #: 6    SUBJECTIVE:   Pt notes that overall she has been feeling better with everything. \"I think I am over the hump\"  Pain is still worse in her L elbow compared to her R.     OBJECTIVE:  Current objective measures:  dynamometer:  60 R 60 L 2/10 pain with  testing. Wrist extension painful MMT painful on L not on R.    ROM WNL.          Therapeutic Exercises (CPT 96979):     1. UBE, lvl 2 x 6min    2. Wrist flexors , 10 each 4#     3. Supination and pronation with dowel, 50 loading of wrist extension through range    4. Stretches of wrist extension, x5min supine with nerve glide motion    5. Shoulder/arm combined extension banded, orange-white with changes in pronation and supinatiaon x 15 reps 4 sets total eccentric    6. Wrist extension, 3# x10 each, elbows straight  2 sets    7.  Us and n's with pts wand yellow resistance, review with HEP 1min    8. HEP progressed to pink band, insructed to wean to that level, x 10-15 reps.       Time-based treatments/modalities:  Therapeutic exercise minutes (CPT 64718): 30 minutes       ASSESSMENT:   Response to treatment: Overall improve activity tolerance. Improved strength and decreased pain with maximal  strength. Continued loading of wrist musculature to continue slowly.   PLAN/RECOMMENDATIONS:   Plan for treatment: therapy treatment to continue next visit.  Planned interventions for next visit: continue with current treatment.      "

## 2018-07-03 ENCOUNTER — PHYSICAL THERAPY (OUTPATIENT)
Dept: PHYSICAL THERAPY | Facility: REHABILITATION | Age: 53
End: 2018-07-03
Attending: NURSE PRACTITIONER
Payer: COMMERCIAL

## 2018-07-03 DIAGNOSIS — M25.522 ARTHRALGIA OF BOTH ELBOWS: ICD-10-CM

## 2018-07-03 DIAGNOSIS — M25.521 ARTHRALGIA OF BOTH ELBOWS: ICD-10-CM

## 2018-07-03 PROCEDURE — 97110 THERAPEUTIC EXERCISES: CPT

## 2018-07-03 NOTE — OP THERAPY DAILY TREATMENT
Outpatient Physical Therapy  DAILY TREATMENT     [facility] Outpatient Physical Therapy [place]  [address], [address]  [place] [facility] [address]  Phone:  [phone]  Fax:  [phone]    Date: 07/03/2018    Patient: Zari Day  YOB: 1965  MRN: [id number]     Time Calculation  Start time: 0845  Stop time: 0915 Time Calculation (min): 30 minutes     Chief Complaint:  B elbow pain    Visit #: 7    SUBJECTIVE:   Pt feels the same since last visit: improved overall. Pain is still worse in her L elbow compared to her R and more to touch that with use lately.      OBJECTIVE:  Current objective measures:  dynamometer:  55 R 55 L 6/10 pain with  testing. Wrist extension MMT painful on L not on R.    ROM WNL.          Therapeutic Exercises (CPT 43617):     1. UBE, lvl 3 x 6min    2. Wrist flexors , 2x10 each 4#     3. Supination and pronation with dowel, 50 loading of wrist extension through range    4. Stretches of wrist extension, x5min supine with nerve glide motion    5. Shoulder/arm combined extension banded, orange-white with changes in pronation and supinatiaon x 15 reps 4 sets total eccentric    6. Wrist extension, 3# x10 each, elbows straight  2 sets    7. Wrist/forearms stretches , 30sec x 4    8. HEP progressed to pink band, insructed to wean to that level, x 10-15 reps.     9. TRX, x12    10. Wall ball cirlces in neutral shoulder,  x3 reps of 5 each      Time-based treatments/modalities:  Therapeutic exercise minutes (CPT 70646): 30 minutes     ASSESSMENT:   Response to treatment: Slightly increased pain with testing and strength improved overall. Subjective>objective. Symptoms responding to wrist extensor tendonitis treatment. Continued loading of wrist musculature to continue slowly.     PLAN/RECOMMENDATIONS:   Plan for treatment: therapy treatment to continue next visit.  Planned interventions for next visit: continue with current treatment.

## 2018-07-13 ENCOUNTER — APPOINTMENT (OUTPATIENT)
Dept: PHYSICAL THERAPY | Facility: REHABILITATION | Age: 53
End: 2018-07-13
Attending: NURSE PRACTITIONER
Payer: COMMERCIAL

## 2018-07-16 ENCOUNTER — APPOINTMENT (OUTPATIENT)
Dept: PHYSICAL THERAPY | Facility: REHABILITATION | Age: 53
End: 2018-07-16
Payer: COMMERCIAL

## 2018-07-18 ENCOUNTER — PHYSICAL THERAPY (OUTPATIENT)
Dept: PHYSICAL THERAPY | Facility: REHABILITATION | Age: 53
End: 2018-07-18
Attending: NURSE PRACTITIONER
Payer: COMMERCIAL

## 2018-07-18 DIAGNOSIS — M25.522 ARTHRALGIA OF BOTH ELBOWS: ICD-10-CM

## 2018-07-18 DIAGNOSIS — M25.521 ARTHRALGIA OF BOTH ELBOWS: ICD-10-CM

## 2018-07-18 PROCEDURE — 97110 THERAPEUTIC EXERCISES: CPT

## 2018-07-18 NOTE — OP THERAPY DAILY TREATMENT
Outpatient Physical Therapy  DAILY TREATMENT     [facility] Outpatient Physical Therapy [place]  [address]  [place] [facility] [address]  Phone:  [phone]  Fax:  [phone]    Date: 07/18/2018    Patient: Zari Day  YOB: 1965  MRN: [id number]     Time Calculation  Start time: 0915  Stop time: 0950 Time Calculation (min): 35 minutes     Chief Complaint:  B elbow pain    Visit #: 8    SUBJECTIVE:   Pt feels that the pain his more of a systemic issue and nothing to do with her actual elbows. She feels strong still but gets pain frequently in both elbows. Overall she feels better.     OBJECTIVE:  Current objective measures:  dynamometer:  60 R 55  L NO increase in pain reported with testing. Wrist extension MMT no increase in pain noted. ROM WNL.    PT Functional Assessment Tool Used: DASH  PT Functional Assessment Score: 22%     Therapeutic Exercises (CPT 82542):     1. UBE, lvl 3 x 6min    2. Wrist flexors , 2x10 each 4#     3. Supination and pronation with dowel, 50 loading of wrist extension through range    4. Stretches of wrist extension, x5min supine with nerve glide motion    5. Shoulder/arm combined extension banded, orange-white with changes in pronation and supinatiaon x 15 reps 4 sets total eccentric    6. Wrist extension, 3# x10 each, elbows straight  2 sets    7. Wrist/forearms stretches , 30sec x 4    8. HEP progressed to pink band, insructed to wean to that level, x 10-15 reps.     9. TRX, x12    10. Wall ball cirlces in neutral shoulder,  x3 reps of 5 each      Therapeutic Exercise Summary: Exercises repeated from last visit to promote [place] with HEP.       Time-based treatments/modalities:  Therapeutic exercise minutes (CPT 28650): 35 minutes     ASSESSMENT:   Response to treatment: Strength improved overall and WNL. Overall symptoms plateaued/inconsistent while strength/ROM have improved. Pt to continue HEP for continued improvement. Pt awaiting rheumatologist  appt.       PLAN/RECOMMENDATIONS:   Plan for treatment: discharge patient due to accomplished goals/plateau of improvement.

## 2018-07-19 NOTE — OP THERAPY DISCHARGE SUMMARY
Outpatient Physical Therapy  DISCHARGE SUMMARY NOTE      [facility] [place]  [address]  [place] [address]  Phone:  [phone]  Fax:  [phone]    Date of Visit: 07/18/2018    Patient: Zari Day  YOB: 1965  MRN: [id number]     Referring Provider: EVAN Alicia  [address] [place] [address]   Referring Diagnosis Pain in right elbow [M25.521];Pain in left elbow [M25.522]     Physical Therapy Occurrence Codes    Date physical therapy care plan established or reviewed:  6/6/18   Date physical therapy treatment started:  6/6/18          Functional Limitation G-Codes and Severity Modifiers  PT Functional Assessment Tool Used: DASH  PT Functional Assessment Score: 22%       Your patient is being discharged from Physical Therapy with the following comments:   · Goals partially met  · Progress plateau    Comments:  Zari Day has completed 8 physical therapy sessions on her current prescription. She has improved function, decreased pain, improved strength, consistent ROM, and she continues to progress with her home exercise program. Recommend to discharge patient to full independent home exercise program at this time. Thank you for the opportunity to assist you and your patient.       Limitations Remaining:  none    Recommendations:  Obtain rheumatologist appt. Continue HEP.     [facility]on [facility], PT, DPT    Date: 7/18/2018

## 2018-07-23 ENCOUNTER — APPOINTMENT (OUTPATIENT)
Dept: PHYSICAL THERAPY | Facility: REHABILITATION | Age: 53
End: 2018-07-23
Attending: NURSE PRACTITIONER
Payer: COMMERCIAL

## 2018-07-25 ENCOUNTER — OFFICE VISIT (OUTPATIENT)
Dept: MEDICAL GROUP | Facility: PHYSICIAN GROUP | Age: 53
End: 2018-07-25
Payer: COMMERCIAL

## 2018-07-25 VITALS
OXYGEN SATURATION: 95 % | BODY MASS INDEX: 27.74 KG/M2 | DIASTOLIC BLOOD PRESSURE: 80 MMHG | RESPIRATION RATE: 14 BRPM | HEART RATE: 91 BPM | SYSTOLIC BLOOD PRESSURE: 126 MMHG | WEIGHT: 183 LBS | TEMPERATURE: 97.9 F | HEIGHT: 68 IN

## 2018-07-25 DIAGNOSIS — D23.39 DERMOID CYST OF FOREHEAD: ICD-10-CM

## 2018-07-25 DIAGNOSIS — R35.0 INCREASED URINARY FREQUENCY: ICD-10-CM

## 2018-07-25 DIAGNOSIS — F10.20 ALCOHOL ABUSE WITH PHYSIOLOGICAL DEPENDENCE (HCC): ICD-10-CM

## 2018-07-25 DIAGNOSIS — G40.909 SEIZURE CEREBRAL (HCC): ICD-10-CM

## 2018-07-25 DIAGNOSIS — R76.8 POSITIVE ANA (ANTINUCLEAR ANTIBODY): ICD-10-CM

## 2018-07-25 DIAGNOSIS — N92.6 IRREGULAR MENSES: ICD-10-CM

## 2018-07-25 DIAGNOSIS — M25.541 ARTHRALGIA OF BOTH HANDS: ICD-10-CM

## 2018-07-25 DIAGNOSIS — M25.542 ARTHRALGIA OF BOTH HANDS: ICD-10-CM

## 2018-07-25 PROBLEM — F10.11 MILD ALCOHOL ABUSE IN EARLY REMISSION: Status: RESOLVED | Noted: 2018-01-25 | Resolved: 2018-07-25

## 2018-07-25 PROCEDURE — 99214 OFFICE O/P EST MOD 30 MIN: CPT | Performed by: NURSE PRACTITIONER

## 2018-07-25 RX ORDER — MELOXICAM 7.5 MG/1
7.5 TABLET ORAL DAILY
Qty: 30 TAB | Refills: 3 | Status: ON HOLD | OUTPATIENT
Start: 2018-07-25 | End: 2019-03-07

## 2018-07-25 NOTE — ASSESSMENT & PLAN NOTE
Reports increased frequency. No pain, burning, odor.  Drinking a lot of fluids at night.  Encouraged to decrease nighttime fluids and focus on some Kaegel exercises.

## 2018-07-25 NOTE — ASSESSMENT & PLAN NOTE
"Saw Dr. Pickering and was told she had lupus and started on plaquenil, then he called her back and said she had \"connective tissue disease\".  She is leary of this and would like a second opinion would like to go to [place].  Referral placed.   "

## 2018-07-25 NOTE — ASSESSMENT & PLAN NOTE
Tested + for CLAUDIO and + RF.  Saw Dr. Pickering.  Would like second opinion.  Referral to [facility] placed per patient request.  Reports ongoing joint pain, especially wrists and elbows.  Following anti-inflammatory diet, including tumeric.  Will try some meloxicam for discomfort.

## 2018-07-25 NOTE — PROGRESS NOTES
"Chief Complaint   Patient presents with   • Follow-Up   • Alcohol Problem   • Referral Needed   • Seizure   • Cyst     right temple        Subjective:   Zari Day is a 53 y.o. female here today for evaluation and management of:    Seizure cerebral  Diagnosed with seizures.  Seeing neuro.  Started on antiseizure medication.     Alcohol abuse with physiological dependence (CMS-HCC) (HCC)  Sober for 8 months at this time.  Doing well.      Increased urinary frequency  Reports increased frequency. No pain, burning, odor.  Drinking a lot of fluids at night.  Encouraged to decrease nighttime fluids and focus on some Kaegel exercises.     Arthralgia of both hands  Saw Dr. Pickering and was told she had lupus and started on plaquenil, then he called her back and said she had \"connective tissue disease\".  She is leary of this and would like a second opinion would like to go to [place].  Referral placed.     Positive CLAUDIO (antinuclear antibody)  Tested + for CLAUDIO and + RF.  Saw Dr. Pickering.  Would like second opinion.  Referral to [place] placed per patient request.  Reports ongoing joint pain, especially wrists and elbows.  Following anti-inflammatory diet, including tumeric.  Will try some meloxicam for discomfort.            Current medicines (including changes today)  Current Outpatient Prescriptions   Medication Sig Dispense Refill   • meloxicam (MOBIC) 7.5 MG Tab Take 1 Tab by mouth every day. 30 Tab 3   • hydroxychloroquine (PLAQUENIL) 200 MG Tab   2   • zonisamide (ZONEGRAN) 100 MG Cap Take 2 Caps by mouth every day. 60 Cap 4   • folic acid (FOLVITE) 1 MG Tab Take 1 Tab by mouth every day. 90 Tab 3   • multivitamin (THERAGRAN) Tab Take 1 Tab by mouth every day. 30 Tab 0   • Cholecalciferol 33719 UNIT Cap Take 1 Cap by mouth every 7 days. 12 Cap 3     No current facility-administered medications for this visit.      She  has a past medical history of Alcoholic hepatitis; ETOH abuse; and Thrombocytopenia (HCC).    ROS " "as stated in hpi  No chest pain, no shortness of breath, no abdominal pain       Objective:     Blood pressure 126/80, pulse 91, temperature 36.6 °C (97.9 °F), resp. rate 14, height 1.727 m (5' 8\"), weight 83 kg (183 lb), SpO2 95 %. Body mass index is 27.83 kg/m².   Physical Exam:  Constitutional: Alert, no distress.  Skin: Warm, dry, good turgor,no cyanosis, no rashes in visible areas. Dermoid cyst on right forehead, non painful, no erythema  Eye: Equal, round and reactive, conjunctiva clear, lids normal.  Ears: No tenderness, no discharge.  External canals are without any significant edema or erythema.  .  Gross auditory acuity is intact.  Nose: symmetrical without tenderness, no discharge.  Mouth/Throat: lips without lesion.  Oropharynx clear.   Neck: Trachea midline, no masses, no obvious thyroid enlargement.. No cervical or supraclavicular lymphadenopathy. Range of motion within normal limits.  Neuro: Cranial nerves 2-12 grossly intact.  No sensory deficit.  Respiratory: Unlabored respiratory effort, lungs clear to auscultation, no wheezes, no ronchi.  Cardiovascular: Normal S1, S2, no murmur, no edema.  Psych: Alert and oriented x3, normal affect and mood and judgement.        Assessment and Plan:   The following treatment plan was discussed    1. Seizure cerebral  This is a new problem to me.  Seeing Dr. Aguirre.  Started on anti-seizure meds.  Has EEG and follow up scheduled.  Monitor.     2. Positive CLAUDIO (antinuclear antibody)  Chronic, ongoing. Unstable.  Will check for autoimmune thyroid disease.  Labs ordered.  Referral for 2nd opinion to [facility].  Monitor and follow.  Meloxicam 7.5 mg daily.  Discussed risks/benefits of this medication for joint pain.    - THYROID PEROXIDASE  (TPO) AB; Future  - ANTITHYROGLOBULIN AB; Future  - REFERRAL TO OTHER    3. Irregular menses  This is a new problem to me.  Chronic.  Had one year without menses and then restarted periods.  Will check labs.  Monitor and " follow.   - FSH/LH; Future  - ESTRADIOL; Future    4. Dermoid cyst of forehead  This is a new problem to me.  Chronic, ongoing. Stable.  Referral to derm for removal of facial dermoid cyst.   - REFERRAL TO DERMATOLOGY    5. Alcohol abuse with physiological dependence (HCC)  Chronic, ongoing. Stable.  Sober for 8 months.     6. Increased urinary frequency  This is a new problem to me.  Chronic, ongoing.  May represent some urge incontinence.  Decrease nighttime fluids, kaegel exercises.  Red flag warnings reviewed.  Monitor and follow.     7. Arthralgia of both hands  See #2      Followup: Return in about 6 months (around 1/25/2019) for joint pain.

## 2018-07-30 ENCOUNTER — TELEPHONE (OUTPATIENT)
Dept: MEDICAL GROUP | Facility: PHYSICIAN GROUP | Age: 53
End: 2018-07-30

## 2018-07-30 ENCOUNTER — APPOINTMENT (OUTPATIENT)
Dept: PHYSICAL THERAPY | Facility: REHABILITATION | Age: 53
End: 2018-07-30
Attending: NURSE PRACTITIONER
Payer: COMMERCIAL

## 2018-07-30 NOTE — TELEPHONE ENCOUNTER
"·  Lab Request paperwork received from [facility] requiring provider signature.     · All appropriate fields completed by Medical Assistant: No    · Paperwork placed in \"MA to Provider\" folder/basket. Awaiting provider completion/signature.  "

## 2018-08-06 ENCOUNTER — APPOINTMENT (OUTPATIENT)
Dept: PHYSICAL THERAPY | Facility: REHABILITATION | Age: 53
End: 2018-08-06
Attending: NURSE PRACTITIONER
Payer: COMMERCIAL

## 2018-08-16 ENCOUNTER — HOSPITAL ENCOUNTER (OUTPATIENT)
Dept: LAB | Facility: MEDICAL CENTER | Age: 53
End: 2018-08-16
Attending: PSYCHIATRY & NEUROLOGY
Payer: COMMERCIAL

## 2018-08-16 ENCOUNTER — HOSPITAL ENCOUNTER (OUTPATIENT)
Dept: LAB | Facility: MEDICAL CENTER | Age: 53
End: 2018-08-16
Attending: NURSE PRACTITIONER
Payer: COMMERCIAL

## 2018-08-16 DIAGNOSIS — N92.6 IRREGULAR MENSES: ICD-10-CM

## 2018-08-16 DIAGNOSIS — R76.8 POSITIVE ANA (ANTINUCLEAR ANTIBODY): ICD-10-CM

## 2018-08-16 DIAGNOSIS — E56.9 VITAMIN DEFICIENCY: ICD-10-CM

## 2018-08-16 DIAGNOSIS — G40.909 SEIZURE CEREBRAL (HCC): ICD-10-CM

## 2018-08-16 LAB
ESTRADIOL SERPL-MCNC: 35 PG/ML
FSH SERPL-ACNC: 81.7 MIU/ML
LH SERPL-ACNC: 55 IU/L
THYROPEROXIDASE AB SERPL-ACNC: 0.4 IU/ML (ref 0–9)
THYROPEROXIDASE AB SERPL-ACNC: 0.4 IU/ML (ref 0–9)
VIT B12 SERPL-MCNC: 476 PG/ML (ref 211–911)

## 2018-08-16 PROCEDURE — 82670 ASSAY OF TOTAL ESTRADIOL: CPT

## 2018-08-16 PROCEDURE — 82607 VITAMIN B-12: CPT

## 2018-08-16 PROCEDURE — 84207 ASSAY OF VITAMIN B-6: CPT

## 2018-08-16 PROCEDURE — 36415 COLL VENOUS BLD VENIPUNCTURE: CPT

## 2018-08-16 PROCEDURE — 83002 ASSAY OF GONADOTROPIN (LH): CPT

## 2018-08-16 PROCEDURE — 86376 MICROSOMAL ANTIBODY EACH: CPT

## 2018-08-16 PROCEDURE — 86800 THYROGLOBULIN ANTIBODY: CPT | Mod: 91

## 2018-08-16 PROCEDURE — 86376 MICROSOMAL ANTIBODY EACH: CPT | Mod: 91

## 2018-08-16 PROCEDURE — 86800 THYROGLOBULIN ANTIBODY: CPT

## 2018-08-16 PROCEDURE — 83001 ASSAY OF GONADOTROPIN (FSH): CPT

## 2018-08-17 LAB
THYROGLOB AB SERPL-ACNC: 11.4 IU/ML (ref 0–4)
THYROGLOB AB SERPL-ACNC: 12.1 IU/ML (ref 0–4)

## 2018-08-19 LAB — VIT B6 SERPL-MCNC: 53.7 NMOL/L (ref 20–125)

## 2018-08-20 ENCOUNTER — OFFICE VISIT (OUTPATIENT)
Dept: MEDICAL GROUP | Facility: PHYSICIAN GROUP | Age: 53
End: 2018-08-20
Payer: COMMERCIAL

## 2018-08-20 VITALS
OXYGEN SATURATION: 99 % | DIASTOLIC BLOOD PRESSURE: 74 MMHG | BODY MASS INDEX: 27.28 KG/M2 | SYSTOLIC BLOOD PRESSURE: 116 MMHG | WEIGHT: 180 LBS | HEIGHT: 68 IN | HEART RATE: 70 BPM | RESPIRATION RATE: 16 BRPM | TEMPERATURE: 98.2 F

## 2018-08-20 DIAGNOSIS — F10.20 ALCOHOL ABUSE WITH PHYSIOLOGICAL DEPENDENCE (HCC): ICD-10-CM

## 2018-08-20 DIAGNOSIS — Z86.19 H/O COLD SORES: ICD-10-CM

## 2018-08-20 DIAGNOSIS — E78.5 HYPERLIPIDEMIA, UNSPECIFIED HYPERLIPIDEMIA TYPE: ICD-10-CM

## 2018-08-20 DIAGNOSIS — M25.541 ARTHRALGIA OF BOTH HANDS: ICD-10-CM

## 2018-08-20 DIAGNOSIS — G40.909 SEIZURE CEREBRAL (HCC): ICD-10-CM

## 2018-08-20 DIAGNOSIS — D64.9 NORMOCYTIC ANEMIA: ICD-10-CM

## 2018-08-20 DIAGNOSIS — M25.542 ARTHRALGIA OF BOTH HANDS: ICD-10-CM

## 2018-08-20 DIAGNOSIS — R79.89 LOW VITAMIN D LEVEL: ICD-10-CM

## 2018-08-20 DIAGNOSIS — D64.9 ANEMIA, UNSPECIFIED TYPE: ICD-10-CM

## 2018-08-20 DIAGNOSIS — Z12.11 COLON CANCER SCREENING: ICD-10-CM

## 2018-08-20 PROCEDURE — 99204 OFFICE O/P NEW MOD 45 MIN: CPT | Performed by: INTERNAL MEDICINE

## 2018-08-20 RX ORDER — ACYCLOVIR 400 MG/1
400 TABLET ORAL 2 TIMES DAILY
Qty: 60 TAB | Refills: 1 | Status: SHIPPED | OUTPATIENT
Start: 2018-08-20 | End: 2019-03-23 | Stop reason: SDUPTHER
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
## 2018-08-20 NOTE — ASSESSMENT & PLAN NOTE
Saw Dr. Pickering but says he has been equivocal with her diagnosis (a connective tissue disease vs lupus). Had positive RF a while ago but was asymptomatic then but now has joint pains. She is seeking a second opinion with [place] and has an appointment with their rheumatology dept in January 2019. Has positive CLAUDIO with negative reflex.
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
## 2018-09-07 ENCOUNTER — NON-PROVIDER VISIT (OUTPATIENT)
Dept: NEUROLOGY | Facility: MEDICAL CENTER | Age: 53
End: 2018-09-07
Payer: COMMERCIAL

## 2018-09-07 DIAGNOSIS — G40.909 SEIZURE CEREBRAL (HCC): ICD-10-CM

## 2018-09-07 PROCEDURE — 95816 EEG AWAKE AND DROWSY: CPT | Performed by: PSYCHIATRY & NEUROLOGY
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
## 2018-09-12 ENCOUNTER — OFFICE VISIT (OUTPATIENT)
Dept: DERMATOLOGY | Facility: IMAGING CENTER | Age: 53
End: 2018-09-12
Payer: COMMERCIAL

## 2018-09-12 DIAGNOSIS — L72.3 SEBACEOUS CYST: ICD-10-CM

## 2018-09-12 PROCEDURE — 99213 OFFICE O/P EST LOW 20 MIN: CPT | Performed by: NURSE PRACTITIONER

## 2018-09-12 NOTE — ASSESSMENT & PLAN NOTE
Patient has noticed a cyst on her face near her right eyebrow for about one year.  It is somewhat tender and interferes with her eyeglasses and wearing of a hat.  It hurts when she brushes her hair or washes her face.  It is getting larger

## 2018-09-12 NOTE — PROGRESS NOTES
Chief Complaint   Patient presents with   • Skin Lesion         HISTORY OF THE PRESENT ILLNESS: Patient is a 53 y.o. female.  Zari is here regarding a cyst on her face.  She has no personal history of skin cancer.  Her mother has a history of basal cell carcinoma.  She is a smoker.  She does have history of thrombocytopenia.    Sebaceous cyst  Patient has noticed a cyst on her face near her right eyebrow for about one year.  It is somewhat tender and interferes with her eyeglasses and wearing of a hat.  It hurts when she brushes her hair or washes her face.  It is getting larger      Allergies: Patient has no known allergies.                    Review of Systems   Constitutional: Negative for fever, chills, weight loss and malaise/fatigue.   Skin: Cyst on her face      Exam: There were no vitals taken for this visit.  General: Normal appearing. No distress.    Skin: Right side of the face lateral to the right eyebrow a 2 cm soft slightly tender sebaceous cyst is noted a pictures taken to be scanned into patient's chart.  Dr. Perry is consulted regarding this cyst she comes into the room to review excision with the patient.  She discussed with the patient that if she finds the cyst is difficult to remove due to the proximity to the facial nerve she may have to close and refer her to a plastic surgeon.  Patient voices understanding.      Please note that this dictation was created using voice recognition software. I have made every reasonable attempt to correct obvious errors, but I expect that there are errors of grammar and possibly content that I did not discover before finalizing the note.      Assessment/Plan  1. Sebaceous cyst   patient will be scheduled for excision try to authorize excision with her insurance.

## 2018-10-08 ENCOUNTER — HOSPITAL ENCOUNTER (OUTPATIENT)
Facility: MEDICAL CENTER | Age: 53
End: 2018-10-08
Attending: DERMATOLOGY
Payer: COMMERCIAL

## 2018-10-08 ENCOUNTER — OFFICE VISIT (OUTPATIENT)
Dept: DERMATOLOGY | Facility: IMAGING CENTER | Age: 53
End: 2018-10-08
Payer: COMMERCIAL

## 2018-10-08 VITALS
WEIGHT: 170 LBS | HEIGHT: 68 IN | DIASTOLIC BLOOD PRESSURE: 62 MMHG | TEMPERATURE: 98.4 F | BODY MASS INDEX: 25.76 KG/M2 | SYSTOLIC BLOOD PRESSURE: 118 MMHG

## 2018-10-08 DIAGNOSIS — R20.8 SKIN PAIN: ICD-10-CM

## 2018-10-08 DIAGNOSIS — D17.0 LIPOMA OF FACE: ICD-10-CM

## 2018-10-08 PROCEDURE — 12051 INTMD RPR FACE/MM 2.5 CM/<: CPT | Performed by: DERMATOLOGY

## 2018-10-08 PROCEDURE — 11442 EXC FACE-MM B9+MARG 1.1-2 CM: CPT | Mod: 51 | Performed by: DERMATOLOGY

## 2018-10-08 PROCEDURE — 88305 TISSUE EXAM BY PATHOLOGIST: CPT

## 2018-10-08 NOTE — PROGRESS NOTES
"BENIGN EXCISION PROCEDURE NOTE:    Risks, benefits and alternatives of procedure, including, but not limited to scar/poor cosmetic outcome, nerve damage (including facial nerve damage) bleeding, pain, infection, recurrence of lesion, failed surgery, and need for further surgery, were discussed and written informed consent obtained. Verbal time out completed.     Allergies reviewed: Yes  Pacemaker/defibrillator: No  Artificial joints: No  Antibiotics given: No    Pre-op diagnosis:lipoma vs EIC  Post-op diagnosis: lipoma  Site: right temple  Pre-op size: 20mm    Blood pressure 118/62, temperature 36.9 °C (98.4 °F), height 1.727 m (5' 8\"), weight 77.1 kg (170 lb), not currently breastfeeding.    Procedure: Area of surgery was prepped with alcohol, marked with a sterile marking pen. Anesthesia with 1% lidocaine with epinephrine administered with 30 gauge needle. The area was again cleaned with povidine-iodine swab. With sterile technique, a 15 blade scalpel was used to make a linear incision over the lesion to the level of the subcutaneous fat. Dissection was done with blunt hemostat/iris scissors, but the mass was only partially removed, as the depth of lesion made it unable to be adequately visualized without risk to temporal nerve. Hemostasis was achieved with pressure, and the procedure was ended. Sample of lesion was placed into biopsy container and sent to pathology by staff.    Intermediate closure note:  4.0 monocryl buried vertical mattress sutures were placed x 4 to close dead space. 5.0 superficial interrupted sutures x 4 were placed with prolene to approximate wound edge.  Vaseline applied to wound with bandage. Patient tolerated procedure well and there were no complications, blood loss was minimal.     Final wound size: 15mm    Bandage was placed with vaseline, telfa, gauze and tape. Wound care was discussed with the patient, and written instructions were provided. Patient to return to clinic in 5-7 days " for suture removal. Patient to call us if any problems or concerns with the procedure site arise prior to her scheduled appointment.    Naheed Perry M.D.

## 2018-10-09 LAB — PATHOLOGY CONSULT NOTE: NORMAL

## 2018-10-15 ENCOUNTER — APPOINTMENT (OUTPATIENT)
Dept: DERMATOLOGY | Facility: IMAGING CENTER | Age: 53
End: 2018-10-15
Payer: COMMERCIAL

## 2018-10-29 ENCOUNTER — OFFICE VISIT (OUTPATIENT)
Dept: NEUROLOGY | Facility: MEDICAL CENTER | Age: 53
End: 2018-10-29
Payer: COMMERCIAL

## 2018-10-29 VITALS
HEIGHT: 68 IN | SYSTOLIC BLOOD PRESSURE: 118 MMHG | OXYGEN SATURATION: 98 % | RESPIRATION RATE: 16 BRPM | BODY MASS INDEX: 26.28 KG/M2 | DIASTOLIC BLOOD PRESSURE: 70 MMHG | HEART RATE: 82 BPM | WEIGHT: 173.4 LBS | TEMPERATURE: 98.1 F

## 2018-10-29 DIAGNOSIS — G40.909 SEIZURE CEREBRAL (HCC): ICD-10-CM

## 2018-10-29 PROCEDURE — 99214 OFFICE O/P EST MOD 30 MIN: CPT | Performed by: PSYCHIATRY & NEUROLOGY

## 2018-10-29 RX ORDER — ZONISAMIDE 100 MG/1
300 CAPSULE ORAL DAILY
Qty: 270 CAP | Refills: 1 | Status: SHIPPED | OUTPATIENT
Start: 2018-10-29 | End: 2018-11-21 | Stop reason: SDUPTHER
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
## 2018-11-05 ENCOUNTER — HOSPITAL ENCOUNTER (OUTPATIENT)
Dept: LAB | Facility: MEDICAL CENTER | Age: 53
End: 2018-11-05
Attending: INTERNAL MEDICINE
Payer: COMMERCIAL

## 2018-11-05 DIAGNOSIS — R79.89 LOW VITAMIN D LEVEL: ICD-10-CM

## 2018-11-05 DIAGNOSIS — G40.909 SEIZURE CEREBRAL (HCC): ICD-10-CM

## 2018-11-05 DIAGNOSIS — D64.9 ANEMIA, UNSPECIFIED TYPE: ICD-10-CM

## 2018-11-05 DIAGNOSIS — E78.5 HYPERLIPIDEMIA, UNSPECIFIED HYPERLIPIDEMIA TYPE: ICD-10-CM

## 2018-11-05 LAB
25(OH)D3 SERPL-MCNC: 56 NG/ML (ref 30–100)
ALBUMIN SERPL BCP-MCNC: 5.1 G/DL (ref 3.2–4.9)
ALBUMIN/GLOB SERPL: 2.3 G/DL
ALP SERPL-CCNC: 80 U/L (ref 30–99)
ALT SERPL-CCNC: 30 U/L (ref 2–50)
ANION GAP SERPL CALC-SCNC: 8 MMOL/L (ref 0–11.9)
AST SERPL-CCNC: 29 U/L (ref 12–45)
BASOPHILS # BLD AUTO: 1.4 % (ref 0–1.8)
BASOPHILS # BLD: 0.04 K/UL (ref 0–0.12)
BILIRUB SERPL-MCNC: 0.8 MG/DL (ref 0.1–1.5)
BUN SERPL-MCNC: 18 MG/DL (ref 8–22)
CALCIUM SERPL-MCNC: 10.3 MG/DL (ref 8.5–10.5)
CHLORIDE SERPL-SCNC: 107 MMOL/L (ref 96–112)
CHOLEST SERPL-MCNC: 208 MG/DL (ref 100–199)
CO2 SERPL-SCNC: 27 MMOL/L (ref 20–33)
CREAT SERPL-MCNC: 0.93 MG/DL (ref 0.5–1.4)
EOSINOPHIL # BLD AUTO: 0.1 K/UL (ref 0–0.51)
EOSINOPHIL NFR BLD: 3.5 % (ref 0–6.9)
ERYTHROCYTE [DISTWIDTH] IN BLOOD BY AUTOMATED COUNT: 47.6 FL (ref 35.9–50)
FASTING STATUS PATIENT QL REPORTED: NORMAL
FOLATE SERPL-MCNC: 20.6 NG/ML
GLOBULIN SER CALC-MCNC: 2.2 G/DL (ref 1.9–3.5)
GLUCOSE SERPL-MCNC: 95 MG/DL (ref 65–99)
HCT VFR BLD AUTO: 41.7 % (ref 37–47)
HDLC SERPL-MCNC: 58 MG/DL
HGB BLD-MCNC: 12.9 G/DL (ref 12–16)
HGB RETIC QN AUTO: 34.1 PG/CELL (ref 29–35)
IMM GRANULOCYTES # BLD AUTO: 0.01 K/UL (ref 0–0.11)
IMM GRANULOCYTES NFR BLD AUTO: 0.4 % (ref 0–0.9)
IMM RETICS NFR: 13.5 % (ref 9.3–17.4)
IRON SATN MFR SERPL: 10 % (ref 15–55)
IRON SERPL-MCNC: 49 UG/DL (ref 40–170)
LDLC SERPL CALC-MCNC: 132 MG/DL
LYMPHOCYTES # BLD AUTO: 1.05 K/UL (ref 1–4.8)
LYMPHOCYTES NFR BLD: 36.8 % (ref 22–41)
MCH RBC QN AUTO: 25.7 PG (ref 27–33)
MCHC RBC AUTO-ENTMCNC: 30.9 G/DL (ref 33.6–35)
MCV RBC AUTO: 83.2 FL (ref 81.4–97.8)
MONOCYTES # BLD AUTO: 0.18 K/UL (ref 0–0.85)
MONOCYTES NFR BLD AUTO: 6.3 % (ref 0–13.4)
NEUTROPHILS # BLD AUTO: 1.47 K/UL (ref 2–7.15)
NEUTROPHILS NFR BLD: 51.6 % (ref 44–72)
NRBC # BLD AUTO: 0 K/UL
NRBC BLD-RTO: 0 /100 WBC
PLATELET # BLD AUTO: 94 K/UL (ref 164–446)
PMV BLD AUTO: 10.6 FL (ref 9–12.9)
POTASSIUM SERPL-SCNC: 3.9 MMOL/L (ref 3.6–5.5)
PROT SERPL-MCNC: 7.3 G/DL (ref 6–8.2)
RBC # BLD AUTO: 5.01 M/UL (ref 4.2–5.4)
RETICS # AUTO: 0.11 M/UL (ref 0.04–0.06)
RETICS/RBC NFR: 2.2 % (ref 0.8–2.1)
SODIUM SERPL-SCNC: 142 MMOL/L (ref 135–145)
TIBC SERPL-MCNC: 493 UG/DL (ref 250–450)
TRIGL SERPL-MCNC: 88 MG/DL (ref 0–149)
VIT B12 SERPL-MCNC: 451 PG/ML (ref 211–911)
WBC # BLD AUTO: 2.9 K/UL (ref 4.8–10.8)

## 2018-11-05 PROCEDURE — 36415 COLL VENOUS BLD VENIPUNCTURE: CPT

## 2018-11-05 PROCEDURE — 82746 ASSAY OF FOLIC ACID SERUM: CPT

## 2018-11-05 PROCEDURE — 82306 VITAMIN D 25 HYDROXY: CPT

## 2018-11-05 PROCEDURE — 85046 RETICYTE/HGB CONCENTRATE: CPT

## 2018-11-05 PROCEDURE — 82607 VITAMIN B-12: CPT

## 2018-11-05 PROCEDURE — 80061 LIPID PANEL: CPT

## 2018-11-05 PROCEDURE — 83550 IRON BINDING TEST: CPT

## 2018-11-05 PROCEDURE — 85025 COMPLETE CBC W/AUTO DIFF WBC: CPT

## 2018-11-05 PROCEDURE — 83540 ASSAY OF IRON: CPT

## 2018-11-05 PROCEDURE — 80053 COMPREHEN METABOLIC PANEL: CPT

## 2018-11-21 ENCOUNTER — OFFICE VISIT (OUTPATIENT)
Dept: MEDICAL GROUP | Facility: PHYSICIAN GROUP | Age: 53
End: 2018-11-21
Payer: COMMERCIAL

## 2018-11-21 VITALS
HEIGHT: 68 IN | RESPIRATION RATE: 16 BRPM | HEART RATE: 112 BPM | DIASTOLIC BLOOD PRESSURE: 96 MMHG | WEIGHT: 162 LBS | TEMPERATURE: 99.7 F | SYSTOLIC BLOOD PRESSURE: 146 MMHG | BODY MASS INDEX: 24.55 KG/M2 | OXYGEN SATURATION: 95 %

## 2018-11-21 DIAGNOSIS — G40.909 SEIZURE CEREBRAL (HCC): ICD-10-CM

## 2018-11-21 DIAGNOSIS — F41.9 ANXIETY: ICD-10-CM

## 2018-11-21 DIAGNOSIS — D69.6 THROMBOCYTOPENIA (HCC): ICD-10-CM

## 2018-11-21 PROCEDURE — 99213 OFFICE O/P EST LOW 20 MIN: CPT | Performed by: INTERNAL MEDICINE

## 2018-11-21 RX ORDER — QUETIAPINE FUMARATE 25 MG/1
TABLET, FILM COATED ORAL
Qty: 60 TAB | Refills: 0 | Status: SHIPPED | OUTPATIENT
Start: 2018-11-21 | End: 2018-11-21 | Stop reason: CLARIF

## 2018-11-21 RX ORDER — CITALOPRAM 20 MG/1
20 TABLET ORAL DAILY
Qty: 60 TAB | Refills: 0 | Status: SHIPPED | OUTPATIENT
Start: 2018-11-21 | End: 2018-12-19 | Stop reason: SINTOL

## 2018-11-21 RX ORDER — ZONISAMIDE 100 MG/1
300 CAPSULE ORAL DAILY
Qty: 270 CAP | Refills: 1 | Status: ON HOLD | OUTPATIENT
Start: 2018-11-21 | End: 2019-03-07

## 2018-11-21 RX ORDER — ALPRAZOLAM 0.5 MG/1
0.5 TABLET ORAL
Qty: 30 TAB | Refills: 0 | Status: SHIPPED | OUTPATIENT
Start: 2018-11-21 | End: 2018-12-19 | Stop reason: SINTOL
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
## 2018-11-21 NOTE — ASSESSMENT & PLAN NOTE
Most recent platelet count was 94, although much higher than it has been in the past. Says she has been sober from alcohol.
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
## 2018-11-22 NOTE — PROGRESS NOTES
PRIMARY CARE CLINIC FOLLOW UP VISIT  Chief Complaint   Patient presents with   • Seizure     Misplaced from home due to divorce- without seizure med   • Other     Lupas- 2nd opinion in [facility] 2019    • Difficulty Sleeping     Due to divorce      History of Present Illness     Zari is here with her friend Bibiana for the following:     Seizure cerebral  She is going through a divorce and was displaced from her home so she didn't have access to her zonegran 300 mg daily. Her friend, who is with her today, obtained her zonegran just recently and says that she witnessed Zari have a seizure about a day ago. She last saw her neurologist Dr. Aguirre 10/29/2018 who told her to increase her zonegran to 300 mg daily.     Anxiety  Zari is going through severe anxiety and insomnia since she is going through a divorce with her  of 33 years. She is having difficulty sleeping through the night. Has also been displaced from her home.     Thrombocytopenia (HCC)  Most recent platelet count was 94, although much higher than it has been in the past. Says she has been sober from alcohol.     Current Outpatient Prescriptions   Medication Sig Dispense Refill   • zonisamide (ZONEGRAN) 100 MG Cap Take 3 Caps by mouth every day. 270 Cap 1   • citalopram (CELEXA) 20 MG Tab Take 1 Tab by mouth every day. 60 Tab 0   • ALPRAZolam (XANAX) 0.5 MG Tab Take 1 Tab by mouth 1 time daily as needed for Sleep or Anxiety for up to 30 days. 30 Tab 0   • acyclovir (ZOVIRAX) 400 MG tablet Take 1 Tab by mouth 2 times a day. 60 Tab 1   • meloxicam (MOBIC) 7.5 MG Tab Take 1 Tab by mouth every day. 30 Tab 3   • hydroxychloroquine (PLAQUENIL) 200 MG Tab   2   • Cholecalciferol 15678 UNIT Cap Take 1 Cap by mouth every 7 days. 12 Cap 3   • folic acid (FOLVITE) 1 MG Tab Take 1 Tab by mouth every day. (Patient not taking: Reported on 10/29/2018) 90 Tab 3   • multivitamin (THERAGRAN) Tab Take 1 Tab by mouth every day. (Patient not taking: Reported on 10/29/2018)  "30 Tab 0     No current facility-administered medications for this visit.      Past Medical History:   Diagnosis Date   • Alcoholic hepatitis    • Sebaceous cyst 2018   • Seizure cerebral 2018   • Thrombocytopenia (HCC)      No past surgical history on file.  Social History   Substance Use Topics   • Smoking status: Current Every Day Smoker     Packs/day: 1.00     Years: 5.00   • Smokeless tobacco: Never Used      Comment: smoked in her teens    • Alcohol use No     Social History     Social History Narrative    Retail      Family History   Problem Relation Age of Onset   • Hypertension Mother    • Alcohol/Drug Mother    • Breast Cancer Mother    • Hypertension Father    • Alcohol/Drug Father    • Autoimmune Disease Daughter         lupus   • Autoimmune Disease Son         colitis     Family Status   Relation Status   • Mo Alive   • Fa    • Gisela Alive   • Son Alive     Allergies: Patient has no known allergies.    ROS  As per HPI above. All other systems reviewed and negative.        Objective   Blood pressure 146/96, pulse (!) 112, temperature 37.6 °C (99.7 °F), temperature source Temporal, resp. rate 16, height 1.727 m (5' 7.99\"), weight 73.5 kg (162 lb), SpO2 95 %, not currently breastfeeding. Body mass index is 24.64 kg/m².    General: alert and oriented, pleasant, cooperative  HEENT: Normocephalic, atraumatic.   Cardiovascular: regular rate and rhythm, normal S1/S2  Pulmonary: lungs clear to auscultation bilaterally  Gastrointestinal: no tenderness to palpation. No hepatosplenomegaly. Bowel sounds normoactive  Lymphatics: no cervical or supraclavicular lymphadenopathy   Skin: warm and dry, no lesions or rashes  Psychiatric: tearful, anxious, crying out loud. Pressured, tangential speech       Assessment and Plan   The following treatment plan was discussed     1. Seizure cerebral  Given refill, will route her chart to her Dr. Aguirre given that she did have break through seizures when she had a " lapse in her treatment.   - zonisamide (ZONEGRAN) 100 MG Cap; Take 3 Caps by mouth every day.  Dispense: 270 Cap; Refill: 1    2. Thrombocytopenia (HCC)  Her platelet count is now 94, which is the highest her platelets have been in some time. She insists she isn't drinking anymore although it sounds from her history that bone marrow suppression due to alcohol use was a possible etiology. On her way out of the office a staff member overheard her ask her friend to take her to the bar for a few drinks. Zari was insistent on a referral to hematology, referral placed.   - REFERRAL TO HEMATOLOGY ONCOLOGY Referral to? Other    3. Anxiety  Will start celexa 20 mg given her severe anxiety and also referred to behavioral health. Limited benzodiazepine use, won't likely continue at next visit and will also obtain a UDS.   - citalopram (CELEXA) 20 MG Tab; Take 1 Tab by mouth every day.  Dispense: 60 Tab; Refill: 0  - REFERRAL TO BEHAVIORAL HEALTH  - ALPRAZolam (XANAX) 0.5 MG Tab; Take 1 Tab by mouth 1 time daily as needed for Sleep or Anxiety for up to 30 days.  Dispense: 30 Tab; Refill: 0      Healthcare maintenance     Health Maintenance Due   Topic Date Due   • IMM HEP B VACCINE (1 of 3 - Risk 3-dose series) 07/08/1986   • IMM PNEUMOCOCCAL 19-64 (ADULT) MEDIUM RISK SERIES (1 of 1 - PPSV23) 07/08/1986   • COLONOSCOPY  07/08/2017   • IMM INFLUENZA (1) 09/01/2018       Return in about 4 weeks (around 12/19/2018).    Shorty Ibarra MD  Internal Medicine  [facility]

## 2018-11-26 ENCOUNTER — OFFICE VISIT (OUTPATIENT)
Dept: HEMATOLOGY ONCOLOGY | Facility: MEDICAL CENTER | Age: 53
End: 2018-11-26
Payer: COMMERCIAL

## 2018-11-26 VITALS
SYSTOLIC BLOOD PRESSURE: 140 MMHG | WEIGHT: 161.38 LBS | BODY MASS INDEX: 25.33 KG/M2 | DIASTOLIC BLOOD PRESSURE: 80 MMHG | HEIGHT: 67 IN | HEART RATE: 101 BPM | TEMPERATURE: 99.1 F | RESPIRATION RATE: 16 BRPM | OXYGEN SATURATION: 98 %

## 2018-11-26 DIAGNOSIS — F10.20 ALCOHOL ABUSE WITH PHYSIOLOGICAL DEPENDENCE (HCC): ICD-10-CM

## 2018-11-26 DIAGNOSIS — D70.8 OTHER NEUTROPENIA (HCC): ICD-10-CM

## 2018-11-26 DIAGNOSIS — D69.6 THROMBOCYTOPENIA (HCC): ICD-10-CM

## 2018-11-26 PROCEDURE — 99204 OFFICE O/P NEW MOD 45 MIN: CPT | Performed by: INTERNAL MEDICINE

## 2018-11-26 RX ORDER — IBUPROFEN 200 MG
200 TABLET ORAL EVERY 6 HOURS PRN
COMMUNITY
End: 2020-03-22

## 2018-11-26 RX ORDER — QUETIAPINE FUMARATE 25 MG/1
25 TABLET, FILM COATED ORAL 2 TIMES DAILY
COMMUNITY
End: 2018-12-19 | Stop reason: SINTOL

## 2018-11-26 RX ORDER — PREDNISONE 10 MG/1
10 TABLET ORAL DAILY
Status: ON HOLD | COMMUNITY
End: 2019-03-07

## 2018-11-26 ASSESSMENT — PAIN SCALES - GENERAL: PAINLEVEL: 3=SLIGHT PAIN

## 2018-11-26 NOTE — PROGRESS NOTES
Consult Note: Hematology    Date of consultation: 11/26/2018 12:38 PM    Referring provider: Shorty Ibarra M.D.    Reason for consultation: Thrombocytopenia, leukopenia.    History of presenting illness:     Dear  Shorty Ibarra M.D.,    Thank you very much for allowing me to see  Zari Day today. As you know she is a 53 y.o. year old woman followed for a seizure disorder, anxiety, possible newly diagnosed rheumatoid condition and history of alcoholism, who was noted to have persistent moderate thrombocytopenia and leukopenia on her complete blood counts dating back to June 2017 at which time her platelets were 46,000 and her absolute neutrophil count was 1270. In February of 2018 her CBC showed mild leukopenia with ANC of 1700, anemia with hemoglobin of 10.6 and platelets of 89,000. In November of 2018 CBC showed white count of 2.9 with ANC of 1470, normal hemoglobin and MCV and platelets of 94,000. She reports prior drinking history however denies alcohol for the past 12 months. She is currently undergoing an evaluation for possible SLE however reports not taking any rheumatoid drugs. She is also being treated for a seizure disorder and states that she took an antiepileptic for only the past two months however. She denies bruising or bleeding. She denies prior diagnosis of liver disease. She denies current constitutional symptoms such as fevers, chills or night sweats, however, she reports a recent 10 pound weight loss.     Past Medical History:    Past Medical History:   Diagnosis Date   • Alcoholic hepatitis    • Sebaceous cyst 9/12/2018   • Seizure cerebral 6/22/2018   • Thrombocytopenia (HCC)        Past surgical history:  No past surgical history on file.    Allergies:  Patient has no known allergies.    Medications:    Current Outpatient Prescriptions   Medication Sig Dispense Refill   • zonisamide (ZONEGRAN) 100 MG Cap Take 3 Caps by mouth every day. 270 Cap 1   • citalopram (CELEXA) 20 MG Tab Take 1  Tab by mouth every day. 60 Tab 0   • ALPRAZolam (XANAX) 0.5 MG Tab Take 1 Tab by mouth 1 time daily as needed for Sleep or Anxiety for up to 30 days. 30 Tab 0   • acyclovir (ZOVIRAX) 400 MG tablet Take 1 Tab by mouth 2 times a day. 60 Tab 1   • meloxicam (MOBIC) 7.5 MG Tab Take 1 Tab by mouth every day. 30 Tab 3   • hydroxychloroquine (PLAQUENIL) 200 MG Tab   2   • Cholecalciferol 05365 UNIT Cap Take 1 Cap by mouth every 7 days. 12 Cap 3   • folic acid (FOLVITE) 1 MG Tab Take 1 Tab by mouth every day. (Patient not taking: Reported on 10/29/2018) 90 Tab 3   • multivitamin (THERAGRAN) Tab Take 1 Tab by mouth every day. (Patient not taking: Reported on 10/29/2018) 30 Tab 0     No current facility-administered medications for this visit.        Social History:     Social History     Social History   • Marital status:      Spouse name: N/A   • Number of children: N/A   • Years of education: N/A     Occupational History   • Not on file.     Social History Main Topics   • Smoking status: Current Every Day Smoker     Packs/day: 1.00     Years: 5.00   • Smokeless tobacco: Never Used      Comment: smoked in her teens    • Alcohol use No   • Drug use: No   • Sexual activity: Not Currently     Partners: Male     Other Topics Concern   • Not on file     Social History Narrative    Retail        Family History:     Family History   Problem Relation Age of Onset   • Hypertension Mother    • Alcohol/Drug Mother    • Breast Cancer Mother    • Hypertension Father    • Alcohol/Drug Father    • Autoimmune Disease Daughter         lupus   • Autoimmune Disease Son         colitis       Review of Systems:  Constitutional: No fever, chills, weight loss ,malaise/fatigue.    HEENT: No new auditory or visual complaints. No sore throat and neck pain.     Respiratory:No new cough, sputum production, shortness of breath and wheezing.    Cardiovascular: No new chest pain, palpitations, orthopnea and leg swelling.    Gastrointestinal: No  "heartburn, nausea, vomiting ,abdominal pain, hematochezia or melena     Genitourinary: Negative for dysuria, hematuria    Musculoskeletal: No new arthralgias or myalgias   Skin: Negative for rash and itching.    Neurological: Negative for focal weakness or headaches.    Endo/Heme/Allergies: No abnormal bleed/bruise.    Psychiatric/Behavioral: anxiety, on treatment    Physical Exam:  /80 (BP Location: Right arm, Patient Position: Sitting)   Pulse (!) 101   Temp 37.3 °C (99.1 °F) (Temporal)   Resp 16   Ht 1.702 m (5' 7\")   Wt 73.2 kg (161 lb 6 oz)   SpO2 98%   BMI 25.28 kg/m²   General: No acute distress.  Eyes: Normal conjuctiva and lids. No icterus.  HEENT: Oropharynx clear.   Neck: Supple with no palpable masses.  Lymph nodes: No palpable cervical, supraclavicular or axillary lymphadenopathy.    CVS: regular rate and rhythm, no rubs or gallops.   RESP: Clear to auscultation bilaterally with normal respiratory effort.   ABD: Soft, non tender, non distended, normal bowel sounds, no palpable organomegaly  EXT: No edema or cyanosis.  CNS: Alert and oriented x3, No focal deficits.  Skin: No rash on visible skin.      Labs:   No results for input(s): RBC, HEMOGLOBIN, HEMATOCRIT, PLATELETCT, PROTHROMBTM, APTT, INR, IRON, FERRITIN, TOTIRONBC in the last 72 hours.  Lab Results   Component Value Date/Time    SODIUM 142 11/05/2018 07:56 AM    POTASSIUM 3.9 11/05/2018 07:56 AM    CHLORIDE 107 11/05/2018 07:56 AM    CO2 27 11/05/2018 07:56 AM    GLUCOSE 95 11/05/2018 07:56 AM    BUN 18 11/05/2018 07:56 AM    CREATININE 0.93 11/05/2018 07:56 AM    CREATININE 0.9 10/10/2006 07:30 PM        Assessment and Plan:  Zari Day today is a 53 y.o. year old woman with past medical history of a seizure disorder, s/p recent initiation of Zonegran, possible SLE diagnosis with final recommendations still pending, as well as past history of alcoholism, who was noted to have persistent and recently improving " thrombocytopenia and mild neutropenia dating back to at least June of 2017 with platelet increase of 46,000 at that genesis to 94,000 earlier this month.     I discussed potential causes of thrombocytopenia and neutropenia with the patient and her mother today which include toxic effect of certain medications and alcohol, liver disease, splenomegaly, certain infections, immune related or in rare cases due to a hematological neoplasm. At current time, there is no clear evidence of liver disease with normal recent LFT's and patient's report of abstinence from alcohol for the past year. This makes toxic effect of alcohol a less likely explanation for her bone marrow suppression at current time. Patient reports not taking any rheumatoid drugs at current time and her thrombocytopenia predates the use of an antiepileptic. There is no splenomegaly or adenopathy on exam to suspect a hematological neoplasm. Given absence of clear explanation for her thrombocytopenia and neutropenia, a bone marrow biopsy is not unreasonable at current time. Patient however declines a bone marrow exam. She will therefore be followed with a repeat CBC w/ diff in 3 months.     Recommendations:  - CBC w/ diff in 3 months  - bone marrow biopsy disucssed, patient declines  - discussed symptoms of severe thrombocytopenia, instructed patient to call or go to the ER if she experiences this  - return to clinic in 3 months or sooner if needed    She agreed and verbalized her agreement and understanding with the current plan.  I answered all questions and concerns she has at this time.              Thank you for allowing me to participate in her care.          SIGNATURES:  Bryce Cheng    CC:  SANIA Caets Bali K, M.D.

## 2018-12-05 ENCOUNTER — TELEPHONE (OUTPATIENT)
Dept: NEUROLOGY | Facility: MEDICAL CENTER | Age: 53
End: 2018-12-05

## 2018-12-17 DIAGNOSIS — F41.9 ANXIETY: ICD-10-CM

## 2018-12-18 ENCOUNTER — TELEPHONE (OUTPATIENT)
Dept: MEDICAL GROUP | Facility: PHYSICIAN GROUP | Age: 53
End: 2018-12-18

## 2018-12-18 NOTE — TELEPHONE ENCOUNTER
Future Appointments       Provider Department Center    12/19/2018 11:15 AM Shorty Ibarra M.D. [facility]    1/28/2019 9:40 AM EVAN Alicia [facility]    2/26/2019 1:20 PM Ian Gibbs M.D. [facility]     4/2/2019 10:40 AM Malcolm Aguirre M.D. [facility]         ESTABLISHED PATIENT PRE-VISIT PLANNING     Patient was NOT contacted to complete PVP.       1.  Reviewed notes from the last few office visits within the medical group: Yes    2.  If any orders were placed at last visit or intended to be done for this visit (i.e. 6 mos follow-up), do we have Results/Consult Notes?        •  Labs - Labs were not ordered at last office visit.       •  Imaging - Imaging was not ordered at last office visit.       •  Referrals - Referral ordered, patient has NOT been seen.    3. Is this appointment scheduled as a Hospital Follow-Up? No    4.  Immunizations were updated in [employer] using WebIZ?: Yes       •  Web [employer] Recommendations: FLU, MMR , TD and SHINGRIX (Shingles)    5.  Patient is due for the following Health Maintenance Topics:   Health Maintenance Due   Topic Date Due   • IMM HEP B VACCINE (1 of 3 - Risk 3-dose series) 07/08/1986   • IMM PNEUMOCOCCAL 19-64 (ADULT) MEDIUM RISK SERIES (1 of 1 - PPSV23) 07/08/1986   • COLONOSCOPY  07/08/2017   • IMM INFLUENZA (1) 09/01/2018     6.  MDX printed for Provider? NO    7.  Patient was NOT informed to arrive 15 min prior to their scheduled appointment and bring in their medication bottles.

## 2018-12-19 ENCOUNTER — OFFICE VISIT (OUTPATIENT)
Dept: MEDICAL GROUP | Facility: PHYSICIAN GROUP | Age: 53
End: 2018-12-19
Payer: COMMERCIAL

## 2018-12-19 VITALS
OXYGEN SATURATION: 97 % | HEART RATE: 87 BPM | RESPIRATION RATE: 14 BRPM | TEMPERATURE: 99 F | BODY MASS INDEX: 25.43 KG/M2 | SYSTOLIC BLOOD PRESSURE: 142 MMHG | WEIGHT: 162 LBS | DIASTOLIC BLOOD PRESSURE: 100 MMHG | HEIGHT: 67 IN

## 2018-12-19 DIAGNOSIS — D69.6 THROMBOCYTOPENIA (HCC): ICD-10-CM

## 2018-12-19 DIAGNOSIS — G40.909 SEIZURE CEREBRAL (HCC): ICD-10-CM

## 2018-12-19 DIAGNOSIS — F41.9 ANXIETY: ICD-10-CM

## 2018-12-19 PROCEDURE — 99213 OFFICE O/P EST LOW 20 MIN: CPT | Performed by: INTERNAL MEDICINE

## 2018-12-19 RX ORDER — QUETIAPINE FUMARATE 25 MG/1
TABLET, FILM COATED ORAL
Qty: 180 TAB | Refills: 0 | OUTPATIENT
Start: 2018-12-19

## 2018-12-19 NOTE — ASSESSMENT & PLAN NOTE
Resumed zonegran at 300 mg daily since she last saw me 11/21/2018 and hasn't had any seizure activity since she last saw me.

## 2018-12-19 NOTE — TELEPHONE ENCOUNTER
"Dr Ibarra- yamini d/c'd med 11/18 due to \"error\". Not sure if that means you queued it up on accident or if you don't want pt to be on. Please advise.   "

## 2018-12-19 NOTE — ASSESSMENT & PLAN NOTE
She tried celexa and xanax but it made her so sedated that she chose to stop these medications and doesn't wish to continue.

## 2019-02-25 ENCOUNTER — OFFICE VISIT (OUTPATIENT)
Dept: MEDICAL GROUP | Facility: PHYSICIAN GROUP | Age: 54
End: 2019-02-25
Payer: COMMERCIAL

## 2019-02-25 VITALS
WEIGHT: 165 LBS | HEIGHT: 67 IN | DIASTOLIC BLOOD PRESSURE: 86 MMHG | TEMPERATURE: 98.7 F | SYSTOLIC BLOOD PRESSURE: 142 MMHG | BODY MASS INDEX: 25.9 KG/M2 | OXYGEN SATURATION: 96 % | RESPIRATION RATE: 16 BRPM | HEART RATE: 109 BPM

## 2019-02-25 DIAGNOSIS — F41.9 ANXIETY: ICD-10-CM

## 2019-02-25 DIAGNOSIS — R11.0 NAUSEA: ICD-10-CM

## 2019-02-25 DIAGNOSIS — D69.6 THROMBOCYTOPENIA (HCC): ICD-10-CM

## 2019-02-25 PROCEDURE — 99214 OFFICE O/P EST MOD 30 MIN: CPT | Performed by: INTERNAL MEDICINE

## 2019-02-25 RX ORDER — HYDROXYZINE HYDROCHLORIDE 25 MG/1
25 TABLET, FILM COATED ORAL
Qty: 30 TAB | Refills: 0 | Status: SHIPPED | OUTPATIENT
Start: 2019-02-25 | End: 2019-03-27

## 2019-02-25 RX ORDER — OMEPRAZOLE 20 MG/1
20 CAPSULE, DELAYED RELEASE ORAL DAILY
Qty: 90 CAP | Refills: 1 | Status: SHIPPED | OUTPATIENT
Start: 2019-02-25 | End: 2019-02-25 | Stop reason: CLARIF

## 2019-02-25 RX ORDER — OMEPRAZOLE 20 MG/1
20 CAPSULE, DELAYED RELEASE ORAL DAILY
Qty: 90 CAP | Refills: 1 | Status: SHIPPED | OUTPATIENT
Start: 2019-02-25 | End: 2019-06-03

## 2019-02-25 RX ORDER — HYDROXYZINE PAMOATE 25 MG/1
25 CAPSULE ORAL
Qty: 60 CAP | Refills: 0 | Status: SHIPPED | OUTPATIENT
Start: 2019-02-25 | End: 2019-02-25 | Stop reason: CLARIF

## 2019-02-25 RX ORDER — HYDROXYZINE HYDROCHLORIDE 25 MG/1
25 TABLET, FILM COATED ORAL
Qty: 60 TAB | Refills: 0 | Status: SHIPPED | OUTPATIENT
Start: 2019-02-25 | End: 2019-02-25 | Stop reason: CLARIF

## 2019-02-25 ASSESSMENT — PATIENT HEALTH QUESTIONNAIRE - PHQ9: CLINICAL INTERPRETATION OF PHQ2 SCORE: 0

## 2019-02-25 NOTE — ASSESSMENT & PLAN NOTE
Zari has been having long standing issues of nausea and vomiting. It's hard for her to tell where her abdominal pain is originating from. Sometimes her symptoms are so severe that she gets so dehydrated that she ends up needing IV hydration. Her symptoms can be quiescent for months and then recur without any triggers that she can identify.

## 2019-02-26 ENCOUNTER — OFFICE VISIT (OUTPATIENT)
Dept: HEMATOLOGY ONCOLOGY | Facility: MEDICAL CENTER | Age: 54
End: 2019-02-26
Payer: COMMERCIAL

## 2019-02-26 VITALS
OXYGEN SATURATION: 95 % | DIASTOLIC BLOOD PRESSURE: 70 MMHG | RESPIRATION RATE: 16 BRPM | TEMPERATURE: 97.7 F | SYSTOLIC BLOOD PRESSURE: 132 MMHG | BODY MASS INDEX: 25.71 KG/M2 | WEIGHT: 163.8 LBS | HEART RATE: 62 BPM | HEIGHT: 67 IN

## 2019-02-26 DIAGNOSIS — F10.20 ALCOHOL ABUSE WITH PHYSIOLOGICAL DEPENDENCE (HCC): ICD-10-CM

## 2019-02-26 DIAGNOSIS — D69.6 THROMBOCYTOPENIA (HCC): ICD-10-CM

## 2019-02-26 PROCEDURE — 99214 OFFICE O/P EST MOD 30 MIN: CPT | Performed by: INTERNAL MEDICINE

## 2019-02-26 ASSESSMENT — PAIN SCALES - GENERAL: PAINLEVEL: 4=SLIGHT-MODERATE PAIN

## 2019-02-26 NOTE — ASSESSMENT & PLAN NOTE
Zari returns from a recent visit with [facility]. The provider she saw there wishes for her to have a bone marrow biopsy to further work up her pantocytopenia as this will help them determine or better diagnose the possibility of her having an autoimmune disease. She follows up with hematology tomorrow.

## 2019-02-26 NOTE — PROGRESS NOTES
"Date of visit: 2/26/2019  1:35 PM      Chief Complaint- Thrombocytopenia, leukopenia.      HPI-  Seen by Dr. Cheng before  -\"53 y.o. year old woman followed for a seizure disorder, anxiety, possible newly diagnosed rheumatoid condition and history of alcoholism, who was noted to have persistent moderate thrombocytopenia and leukopenia on her complete blood counts dating back to June 2017 at which time her platelets were 46,000 and her absolute neutrophil count was 1270. In February of 2018 her CBC showed mild leukopenia with ANC of 1700, anemia with hemoglobin of 10.6 and platelets of 89,000. In November of 2018 CBC showed white count of 2.9 with ANC of 1470, normal hemoglobin and MCV and platelets of 94,000. She reports prior drinking history however denies alcohol for the past 12 months. She is currently undergoing an evaluation for possible SLE however reports not taking any rheumatoid drugs. She is also being treated for a seizure disorder and states that she took an antiepileptic for only the past two months however. She denies bruising or bleeding. She denies prior diagnosis of liver disease. She denies current constitutional symptoms such as fevers, chills or night sweats, however, she reports a recent 10 pound weight loss      -She is here for 3-month follow-up.  She has not done follow-up CBCs.  Denies any new bleeding or infections.  Continues to report weight loss and some fatigue.    Past Medical History:      Past Medical History:   Diagnosis Date   • Alcoholic hepatitis    • Sebaceous cyst 9/12/2018   • Seizure cerebral 6/22/2018   • Thrombocytopenia (HCC)        Past surgical history:     No past surgical history on file.    Allergies:       Patient has no known allergies.    Medications:         Current Outpatient Prescriptions   Medication Sig Dispense Refill   • zonisamide (ZONEGRAN) 100 MG Cap Take 3 Caps by mouth every day. 270 Cap 1   • acyclovir (ZOVIRAX) 400 MG tablet Take 1 Tab by mouth 2 " times a day. 60 Tab 1   • omeprazole (PRILOSEC) 20 MG delayed-release capsule Take 1 Cap by mouth every day. (Patient not taking: Reported on 2/26/2019) 90 Cap 1   • hydrOXYzine HCl (ATARAX) 25 MG Tab Take 1 Tab by mouth 1 time daily as needed for Anxiety for up to 30 days. (Patient not taking: Reported on 2/26/2019) 30 Tab 0   • predniSONE (DELTASONE) 10 MG Tab Take 10 mg by mouth every day.     • ibuprofen (MOTRIN) 200 MG Tab Take 200 mg by mouth every 6 hours as needed.     • meloxicam (MOBIC) 7.5 MG Tab Take 1 Tab by mouth every day. (Patient not taking: Reported on 2/26/2019) 30 Tab 3   • hydroxychloroquine (PLAQUENIL) 200 MG Tab   2   • Cholecalciferol 90717 UNIT Cap Take 1 Cap by mouth every 7 days. (Patient not taking: Reported on 2/26/2019) 12 Cap 3   • folic acid (FOLVITE) 1 MG Tab Take 1 Tab by mouth every day. (Patient not taking: Reported on 10/29/2018) 90 Tab 3   • multivitamin (THERAGRAN) Tab Take 1 Tab by mouth every day. (Patient not taking: Reported on 10/29/2018) 30 Tab 0     No current facility-administered medications for this visit.          Social History:     Social History     Social History   • Marital status:      Spouse name: N/A   • Number of children: N/A   • Years of education: N/A     Occupational History   • Not on file.     Social History Main Topics   • Smoking status: Current Every Day Smoker     Packs/day: 1.00     Years: 5.00   • Smokeless tobacco: Never Used      Comment: smoked in her teens    • Alcohol use No   • Drug use: No   • Sexual activity: Not Currently     Partners: Male     Other Topics Concern   • Not on file     Social History Narrative    Retail        Family History:      Family History   Problem Relation Age of Onset   • Hypertension Mother    • Alcohol/Drug Mother    • Breast Cancer Mother    • Hypertension Father    • Alcohol/Drug Father    • Autoimmune Disease Daughter         lupus   • Autoimmune Disease Son         colitis       Review of  "Systems:  All other review of systems are negative except what was mentioned above in the HPI.    Constitutional: Negative for fever, chills, weight loss and malaise/fatigue.    HEENT: No new auditory or visual complaints. No sore throat and neck pain.     Respiratory: Negative for cough, sputum production, shortness of breath and wheezing.    Cardiovascular: Negative for chest pain, palpitations, orthopnea and leg swelling.    Gastrointestinal: Negative for heartburn, nausea, vomiting and abdominal pain.    Genitourinary: Negative for dysuria, hematuria    Musculoskeletal: No new arthralgias or myalgias   Skin: Negative for rash and itching.    Neurological: Negative for focal weakness and headaches.    Endo/Heme/Allergies: No abnormal bleed/bruise.    Psychiatric/Behavioral: No new depression/anxiety.    Physical Exam:  Vitals: /70 (BP Location: Right arm, Patient Position: Sitting, BP Cuff Size: Adult)   Pulse 62   Temp 36.5 °C (97.7 °F) (Temporal)   Resp 16   Ht 1.702 m (5' 7\")   Wt 74.3 kg (163 lb 12.8 oz)   SpO2 95%   BMI 25.66 kg/m²     General: Not in acute distress, alert and oriented x 3  HEENT: No pallor, icterus. Oropharynx clear.   Neck: Supple, no palpable masses.  Lymph nodes: No palpable cervical, supraclavicular, axillary or inguinal lymphadenopathy.    CVS: regular rate and rhythm, no rubs or gallops  RESP: Clear to auscultate bilaterally, no wheezing or crackles.   ABD: Soft, non tender, non distended, positive bowel sounds, no palpable organomegaly  EXT: No edema or cyanosis  CNS: Alert and oriented x3, No focal deficits.  Skin- No rash      Labs:   No visits with results within 1 Week(s) from this visit.   Latest known visit with results is:   Hospital Outpatient Visit on 11/05/2018   Component Date Value Ref Range Status   • Sodium 11/05/2018 142  135 - 145 mmol/L Final   • Potassium 11/05/2018 3.9  3.6 - 5.5 mmol/L Final   • Chloride 11/05/2018 107  96 - 112 mmol/L Final   • Co2 " 11/05/2018 27  20 - 33 mmol/L Final   • Anion Gap 11/05/2018 8.0  0.0 - 11.9 Final   • Glucose 11/05/2018 95  65 - 99 mg/dL Final   • Bun 11/05/2018 18  8 - 22 mg/dL Final   • Creatinine 11/05/2018 0.93  0.50 - 1.40 mg/dL Final   • Calcium 11/05/2018 10.3  8.5 - 10.5 mg/dL Final   • AST(SGOT) 11/05/2018 29  12 - 45 U/L Final   • ALT(SGPT) 11/05/2018 30  2 - 50 U/L Final   • Alkaline Phosphatase 11/05/2018 80  30 - 99 U/L Final   • Total Bilirubin 11/05/2018 0.8  0.1 - 1.5 mg/dL Final   • Albumin 11/05/2018 5.1* 3.2 - 4.9 g/dL Final   • Total Protein 11/05/2018 7.3  6.0 - 8.2 g/dL Final   • Globulin 11/05/2018 2.2  1.9 - 3.5 g/dL Final   • A-G Ratio 11/05/2018 2.3  g/dL Final   • WBC 11/05/2018 2.9* 4.8 - 10.8 K/uL Final   • RBC 11/05/2018 5.01  4.20 - 5.40 M/uL Final   • Hemoglobin 11/05/2018 12.9  12.0 - 16.0 g/dL Final   • Hematocrit 11/05/2018 41.7  37.0 - 47.0 % Final   • MCV 11/05/2018 83.2  81.4 - 97.8 fL Final   • MCH 11/05/2018 25.7* 27.0 - 33.0 pg Final   • MCHC 11/05/2018 30.9* 33.6 - 35.0 g/dL Final   • RDW 11/05/2018 47.6  35.9 - 50.0 fL Final   • Platelet Count 11/05/2018 94* 164 - 446 K/uL Final   • MPV 11/05/2018 10.6  9.0 - 12.9 fL Final   • Neutrophils-Polys 11/05/2018 51.60  44.00 - 72.00 % Final   • Lymphocytes 11/05/2018 36.80  22.00 - 41.00 % Final   • Monocytes 11/05/2018 6.30  0.00 - 13.40 % Final   • Eosinophils 11/05/2018 3.50  0.00 - 6.90 % Final   • Basophils 11/05/2018 1.40  0.00 - 1.80 % Final   • Immature Granulocytes 11/05/2018 0.40  0.00 - 0.90 % Final   • Nucleated RBC 11/05/2018 0.00  /100 WBC Final   • Neutrophils (Absolute) 11/05/2018 1.47* 2.00 - 7.15 K/uL Final    Includes immature neutrophils, if present.   • Lymphs (Absolute) 11/05/2018 1.05  1.00 - 4.80 K/uL Final   • Monos (Absolute) 11/05/2018 0.18  0.00 - 0.85 K/uL Final   • Eos (Absolute) 11/05/2018 0.10  0.00 - 0.51 K/uL Final   • Baso (Absolute) 11/05/2018 0.04  0.00 - 0.12 K/uL Final   • Immature Granulocytes (abs)  11/05/2018 0.01  0.00 - 0.11 K/uL Final   • NRBC (Absolute) 11/05/2018 0.00  K/uL Final   • Cholesterol,Tot 11/05/2018 208* 100 - 199 mg/dL Final   • Triglycerides 11/05/2018 88  0 - 149 mg/dL Final   • HDL 11/05/2018 58  >=40 mg/dL Final   • LDL 11/05/2018 132* <100 mg/dL Final   • 25-Hydroxy   Vitamin D 25 11/05/2018 56  30 - 100 ng/mL Final    Comment: Adult Ranges:   <20 ng/mL - Deficiency  20-29 ng/mL - Insufficiency   ng/mL - Sufficiency  The [employer] Centaur Vitamin D Assay is standardized to the  [facility] reference measurement procedures, a  reference method for the Vitamin D Standardization Program  (VDSP).  The VDSP aligns patient results among 25 (OH)  Vitamin D methods.     • Iron 11/05/2018 49  40 - 170 ug/dL Final   • Total Iron Binding 11/05/2018 493* 250 - 450 ug/dL Final   • % Saturation 11/05/2018 10* 15 - 55 % Final   • Folate -Folic Acid 11/05/2018 20.6  >4.0 ng/mL Final   • Vitamin B12 -True Cobalamin 11/05/2018 451  211 - 911 pg/mL Final   • Reticulocyte Count 11/05/2018 2.2* 0.8 - 2.1 % Final   • Retic, Absolute 11/05/2018 0.11* 0.04 - 0.06 M/uL Final   • Imm. Reticulocyte Fraction 11/05/2018 13.5  9.3 - 17.4 % Final   • Retic Hgb Equivalent 11/05/2018 34.1  29.0 - 35.0 pg/cell Final   • Fasting Status 11/05/2018 Fasting   Final   • GFR If  11/05/2018 >60  >60 mL/min/1.73 m 2 Final   • GFR If Non  11/05/2018 >60  >60 mL/min/1.73 m 2 Final             Assessment and Plan:    Zari Day today is a 53 y.o. year old woman with past medical history of a seizure disorder, s/p recent initiation of Zonegran, possible SLE diagnosis with final recommendations still pending, as well as past history of alcoholism, who was noted to have persistent and recently improving thrombocytopenia and mild neutropenia dating back to at least June of 2017 with platelet increase of 46,000 at that genesis to 94,000 back in December.    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  She did not do follow-up CBC.  Instructed her to do follow-up CBC.  Given her involuntary weight loss, discussed the option of doing CT scan to evaluate for any cirrhosis/splenomegaly or lymphoma.  She is agreeable to this.  I will order the CT scan and see her back.  Inform her that overall suspicion for malignancy is on the low side.    She agreed and verbalized  agreement and understanding with the current plan.  I answered all questions and concerns at this time         Please note that this dictation was created using voice recognition software. I have made every reasonable attempt to correct obvious errors, but I expect that there are errors of grammar and possibly content that I did not discover before finalizing the note.      SIGNATURES:  Ian Gibbs    CC:  Shorty Ibarra M.D.  No ref. provider found

## 2019-02-26 NOTE — PROGRESS NOTES
PRIMARY CARE CLINIC FOLLOW UP VISIT  Chief Complaint   Patient presents with   • Other     [place] Follow up   • Anxiety     Req a prn      History of Present Illness     Nausea  Zari has been having long standing issues of nausea and vomiting. It's hard for her to tell where her abdominal pain is originating from. Sometimes her symptoms are so severe that she gets so dehydrated that she ends up needing IV hydration. Her symptoms can be quiescent for months and then recur without any triggers that she can identify.     Anxiety  She is inquiring about an anxiolytic to use as needed that isn't addictive.     Thrombocytopenia (HCC)  Zari returns from a recent visit with [place] [facility]. The provider she saw there wishes for her to have a bone marrow biopsy to further work up her pantocytopenia as this will help them determine or better diagnose the possibility of her having an autoimmune disease. She follows up with hematology tomorrow.     Current Outpatient Prescriptions   Medication Sig Dispense Refill   • omeprazole (PRILOSEC) 20 MG delayed-release capsule Take 1 Cap by mouth every day. 90 Cap 1   • hydrOXYzine HCl (ATARAX) 25 MG Tab Take 1 Tab by mouth 1 time daily as needed for Anxiety for up to 30 days. 30 Tab 0   • predniSONE (DELTASONE) 10 MG Tab Take 10 mg by mouth every day.     • ibuprofen (MOTRIN) 200 MG Tab Take 200 mg by mouth every 6 hours as needed.     • zonisamide (ZONEGRAN) 100 MG Cap Take 3 Caps by mouth every day. 270 Cap 1   • acyclovir (ZOVIRAX) 400 MG tablet Take 1 Tab by mouth 2 times a day. 60 Tab 1   • meloxicam (MOBIC) 7.5 MG Tab Take 1 Tab by mouth every day. 30 Tab 3   • hydroxychloroquine (PLAQUENIL) 200 MG Tab   2   • Cholecalciferol 17101 UNIT Cap Take 1 Cap by mouth every 7 days. 12 Cap 3   • folic acid (FOLVITE) 1 MG Tab Take 1 Tab by mouth every day. (Patient not taking: Reported on 10/29/2018) 90 Tab 3   • multivitamin (THERAGRAN) Tab Take 1 Tab by mouth every day. (Patient  "not taking: Reported on 10/29/2018) 30 Tab 0     No current facility-administered medications for this visit.      Past Medical History:   Diagnosis Date   • Alcoholic hepatitis    • Sebaceous cyst 2018   • Seizure cerebral 2018   • Thrombocytopenia (HCC)      No past surgical history on file.  Social History   Substance Use Topics   • Smoking status: Current Every Day Smoker     Packs/day: 1.00     Years: 5.00   • Smokeless tobacco: Never Used      Comment: smoked in her teens    • Alcohol use No     Social History     Social History Narrative    Retail      Family History   Problem Relation Age of Onset   • Hypertension Mother    • Alcohol/Drug Mother    • Breast Cancer Mother    • Hypertension Father    • Alcohol/Drug Father    • Autoimmune Disease Daughter         lupus   • Autoimmune Disease Son         colitis     Family Status   Relation Status   • Mo Alive   • Fa    • Gisela Alive   • Son Alive     Allergies: Patient has no known allergies.    ROS  As per HPI above. All other systems reviewed and negative.        Objective   Blood pressure 142/86, pulse (!) 109, temperature 37.1 °C (98.7 °F), resp. rate 16, height 1.702 m (5' 7\"), weight 74.8 kg (165 lb), SpO2 96 %, not currently breastfeeding. Body mass index is 25.84 kg/m².    General: alert and oriented, pleasant, cooperative  HEENT: Normocephalic, atraumatic.   Gastrointestinal: no tenderness to palpation. No hepatosplenomegaly. Bowel sounds normoactive  Lymphatics: no cervical or supraclavicular lymphadenopathy   Skin: warm and dry, no lesions or rashes  Psychiatric: appropriate mood and affect. Good insight and appropriate judgment     Assessment and Plan   The following treatment plan was discussed     1. Nausea  Unclear etiology. Will trial a PPI and consider a US of her abdomen to begin with. If her symptoms persist then may want to consider colonoscopy especially since she hasn't had an age appropriate screening colonoscopy.   - " US-ABDOMEN COMPLETE SURVEY; Future  - omeprazole (PRILOSEC) 20 MG delayed-release capsule; Take 1 Cap by mouth every day.  Dispense: 90 Cap; Refill: 1    2. Anxiety  Trial of hydroxyzine as needed, warned about sedation side effects.     3. Thrombocytopenia (HCC)  She will follow up with hematology tomorrow where they will review the recommendations from [place] [facility] including a further evaluation with a bone marrow biopsy.     Healthcare maintenance     Health Maintenance Due   Topic Date Due   • IMM HEP B VACCINE (1 of 3 - Risk 3-dose series) 07/08/1986   • IMM PNEUMOCOCCAL 19-64 (ADULT) MEDIUM RISK SERIES (1 of 1 - PPSV23) 07/08/1986   • COLONOSCOPY  07/08/2017   • IMM INFLUENZA (1) 09/01/2018       Return in about 2 months (around 4/25/2019).    Shorty Ibarra MD  Internal Medicine  [facility]

## 2019-02-27 ENCOUNTER — TELEPHONE (OUTPATIENT)
Dept: HEMATOLOGY ONCOLOGY | Facility: MEDICAL CENTER | Age: 54
End: 2019-02-27

## 2019-02-27 NOTE — TELEPHONE ENCOUNTER
I informed the patient of her bone marrow biopsy scheduled for Thursday March 7th with a check in time of 0900. She agreed. She is aware she should fast for eight hours prior.

## 2019-02-27 NOTE — TELEPHONE ENCOUNTER
I spoke with Jena PETERSON at [facility]. She informs me     Bone marrow biopsy  CPT: 90739 & [id number]    No authorization is necessary.

## 2019-02-28 ENCOUNTER — HOSPITAL ENCOUNTER (EMERGENCY)
Facility: MEDICAL CENTER | Age: 54
End: 2019-02-28
Attending: EMERGENCY MEDICINE
Payer: COMMERCIAL

## 2019-02-28 VITALS
WEIGHT: 162 LBS | RESPIRATION RATE: 20 BRPM | OXYGEN SATURATION: 93 % | BODY MASS INDEX: 25.43 KG/M2 | HEART RATE: 130 BPM | DIASTOLIC BLOOD PRESSURE: 114 MMHG | SYSTOLIC BLOOD PRESSURE: 174 MMHG | HEIGHT: 67 IN

## 2019-02-28 DIAGNOSIS — F12.90 MARIJUANA USE: ICD-10-CM

## 2019-02-28 DIAGNOSIS — R45.851 SUICIDAL IDEATION: ICD-10-CM

## 2019-02-28 DIAGNOSIS — F29 PSYCHOSIS, UNSPECIFIED PSYCHOSIS TYPE (HCC): ICD-10-CM

## 2019-02-28 LAB
AMPHETAMINES UR QL SCN: NEGATIVE
BARBITURATES UR QL SCN: NEGATIVE
BENZODIAZ UR QL SCN: NEGATIVE
COCAINE UR QL SCN: NEGATIVE
OPIATES UR QL SCN: NEGATIVE
PCP UR QL SCN: NEGATIVE
POC BREATHALIZER: 0.01 PERCENT (ref 0–0.01)
THC UR QL SCN: POSITIVE

## 2019-02-28 PROCEDURE — 80305 DRUG TEST PRSMV DIR OPT OBS: CPT

## 2019-02-28 PROCEDURE — 700102 HCHG RX REV CODE 250 W/ 637 OVERRIDE(OP): Performed by: EMERGENCY MEDICINE

## 2019-02-28 PROCEDURE — 302970 POC BREATHALIZER: Performed by: EMERGENCY MEDICINE

## 2019-02-28 PROCEDURE — A9270 NON-COVERED ITEM OR SERVICE: HCPCS | Performed by: EMERGENCY MEDICINE

## 2019-02-28 PROCEDURE — 99285 EMERGENCY DEPT VISIT HI MDM: CPT

## 2019-02-28 RX ORDER — LORAZEPAM 1 MG/1
1 TABLET ORAL ONCE
Status: COMPLETED | OUTPATIENT
Start: 2019-02-28 | End: 2019-02-28

## 2019-02-28 RX ADMIN — LORAZEPAM 1 MG: 1 TABLET ORAL at 21:15

## 2019-02-28 RX ADMIN — LORAZEPAM 1 MG: 1 TABLET ORAL at 20:09
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
## 2019-03-01 NOTE — ED NOTES
erp to bedside to discuss plan of care. Aware of return to [facility], personal belongings and meds to accompany pt. Allowed to use cell phone to call mom with sitter preforming direct observation of same. Meaghan at [facility] ([phone]) notified of pt clearance and remsa  at [phone]

## 2019-03-01 NOTE — ED NOTES
Ativan po repeated per pt request and erp order for pt increased in anxiety and amb in room despite verbal reassurance and support

## 2019-03-01 NOTE — ED NOTES
Assumed care of pt-med per erp for anxiety, vs as charted, sitter at door way for observation pending lifeskills eval  And on moniter due to elevated hr and bp upon er presentation

## 2019-03-05 ENCOUNTER — HOSPITAL ENCOUNTER (OUTPATIENT)
Dept: RADIOLOGY | Facility: MEDICAL CENTER | Age: 54
End: 2019-03-05
Attending: INTERNAL MEDICINE
Payer: COMMERCIAL

## 2019-03-05 ENCOUNTER — PATIENT MESSAGE (OUTPATIENT)
Dept: MEDICAL GROUP | Facility: PHYSICIAN GROUP | Age: 54
End: 2019-03-05

## 2019-03-05 DIAGNOSIS — R11.0 NAUSEA: ICD-10-CM

## 2019-03-05 DIAGNOSIS — R10.9 ABDOMINAL PAIN, UNSPECIFIED ABDOMINAL LOCATION: ICD-10-CM

## 2019-03-05 PROCEDURE — 76700 US EXAM ABDOM COMPLETE: CPT

## 2019-03-06 ENCOUNTER — TELEPHONE (OUTPATIENT)
Dept: HEMATOLOGY ONCOLOGY | Facility: MEDICAL CENTER | Age: 54
End: 2019-03-06

## 2019-03-06 NOTE — TELEPHONE ENCOUNTER
I called and spoke with pt about her bone marrow aspiration/biopsy scheduled for tomorrow.  She is very nervous.  She wants to make sure that she gets sedation to calm her down when gets here tomorrow.  I did explain the procedure to her, and answered her questions to her satisfaction.  I asked if she wanted me to put her on hold and find out this information from the endoscopy clinic.  She agreed.  I found out, and told pt, that she can get sedation for the procedure, only if she has a ride and has nothing to eat or drink (including gum and mints) after midnight tonight.  She verbalized understanding.

## 2019-03-06 NOTE — TELEPHONE ENCOUNTER
The patient is having a bone marrow aspiration done tomorrow and she would like to know what to expect? She is very scared.

## 2019-03-07 ENCOUNTER — HOSPITAL ENCOUNTER (OUTPATIENT)
Facility: MEDICAL CENTER | Age: 54
End: 2019-03-07
Attending: INTERNAL MEDICINE | Admitting: INTERNAL MEDICINE
Payer: COMMERCIAL

## 2019-03-07 VITALS
DIASTOLIC BLOOD PRESSURE: 94 MMHG | BODY MASS INDEX: 25.13 KG/M2 | HEART RATE: 72 BPM | HEIGHT: 68 IN | TEMPERATURE: 98.2 F | WEIGHT: 165.79 LBS | OXYGEN SATURATION: 96 % | RESPIRATION RATE: 16 BRPM | SYSTOLIC BLOOD PRESSURE: 140 MMHG

## 2019-03-07 DIAGNOSIS — D69.6 THROMBOCYTOPENIA (HCC): ICD-10-CM

## 2019-03-07 LAB
BASOPHILS # BLD AUTO: 1.9 % (ref 0–1.8)
BASOPHILS # BLD: 0.07 K/UL (ref 0–0.12)
EOSINOPHIL # BLD AUTO: 0.09 K/UL (ref 0–0.51)
EOSINOPHIL NFR BLD: 2.5 % (ref 0–6.9)
ERYTHROCYTE [DISTWIDTH] IN BLOOD BY AUTOMATED COUNT: 52.4 FL (ref 35.9–50)
HCT VFR BLD AUTO: 40.5 % (ref 37–47)
HGB BLD-MCNC: 13.4 G/DL (ref 12–16)
HGB RETIC QN AUTO: 32.7 PG/CELL (ref 29–35)
IMM GRANULOCYTES # BLD AUTO: 0.02 K/UL (ref 0–0.11)
IMM GRANULOCYTES NFR BLD AUTO: 0.5 % (ref 0–0.9)
IMM RETICS NFR: 16.5 % (ref 9.3–17.4)
LYMPHOCYTES # BLD AUTO: 0.97 K/UL (ref 1–4.8)
LYMPHOCYTES NFR BLD: 26.4 % (ref 22–41)
MCH RBC QN AUTO: 32.1 PG (ref 27–33)
MCHC RBC AUTO-ENTMCNC: 33.1 G/DL (ref 33.6–35)
MCV RBC AUTO: 96.9 FL (ref 81.4–97.8)
MONOCYTES # BLD AUTO: 0.38 K/UL (ref 0–0.85)
MONOCYTES NFR BLD AUTO: 10.4 % (ref 0–13.4)
NEUTROPHILS # BLD AUTO: 2.14 K/UL (ref 2–7.15)
NEUTROPHILS NFR BLD: 58.3 % (ref 44–72)
NRBC # BLD AUTO: 0 K/UL
NRBC BLD-RTO: 0 /100 WBC
PATHOLOGY CONSULT NOTE: NORMAL
PLATELET # BLD AUTO: 113 K/UL (ref 164–446)
PMV BLD AUTO: 9.8 FL (ref 9–12.9)
RBC # BLD AUTO: 4.18 M/UL (ref 4.2–5.4)
RETICS # AUTO: 0.13 M/UL (ref 0.04–0.06)
RETICS/RBC NFR: 3.1 % (ref 0.8–2.1)
WBC # BLD AUTO: 3.7 K/UL (ref 4.8–10.8)

## 2019-03-07 PROCEDURE — 99152 MOD SED SAME PHYS/QHP 5/>YRS: CPT | Performed by: INTERNAL MEDICINE

## 2019-03-07 PROCEDURE — 85046 RETICYTE/HGB CONCENTRATE: CPT

## 2019-03-07 PROCEDURE — 160027 HCHG SURGERY MINUTES - 1ST 30 MINS LEVEL 2: Performed by: INTERNAL MEDICINE

## 2019-03-07 PROCEDURE — 38222 DX BONE MARROW BX & ASPIR: CPT | Performed by: INTERNAL MEDICINE

## 2019-03-07 PROCEDURE — 700105 HCHG RX REV CODE 258: Performed by: INTERNAL MEDICINE

## 2019-03-07 PROCEDURE — 160035 HCHG PACU - 1ST 60 MINS PHASE I: Performed by: INTERNAL MEDICINE

## 2019-03-07 PROCEDURE — 88305 TISSUE EXAM BY PATHOLOGIST: CPT

## 2019-03-07 PROCEDURE — 85025 COMPLETE CBC W/AUTO DIFF WBC: CPT

## 2019-03-07 PROCEDURE — 88341 IMHCHEM/IMCYTCHM EA ADD ANTB: CPT

## 2019-03-07 PROCEDURE — 88313 SPECIAL STAINS GROUP 2: CPT

## 2019-03-07 PROCEDURE — 88360 TUMOR IMMUNOHISTOCHEM/MANUAL: CPT | Mod: 91

## 2019-03-07 PROCEDURE — 160002 HCHG RECOVERY MINUTES (STAT): Performed by: INTERNAL MEDICINE

## 2019-03-07 PROCEDURE — 700111 HCHG RX REV CODE 636 W/ 250 OVERRIDE (IP)

## 2019-03-07 PROCEDURE — 88342 IMHCHEM/IMCYTCHM 1ST ANTB: CPT

## 2019-03-07 PROCEDURE — 160048 HCHG OR STATISTICAL LEVEL 1-5: Performed by: INTERNAL MEDICINE

## 2019-03-07 PROCEDURE — 88311 DECALCIFY TISSUE: CPT

## 2019-03-07 RX ORDER — MIDAZOLAM HYDROCHLORIDE 1 MG/ML
INJECTION INTRAMUSCULAR; INTRAVENOUS
Status: DISCONTINUED | OUTPATIENT
Start: 2019-03-07 | End: 2019-03-07 | Stop reason: HOSPADM

## 2019-03-07 RX ORDER — MIDAZOLAM HYDROCHLORIDE 1 MG/ML
.5-2 INJECTION INTRAMUSCULAR; INTRAVENOUS PRN
Status: DISCONTINUED | OUTPATIENT
Start: 2019-03-07 | End: 2019-03-07 | Stop reason: HOSPADM

## 2019-03-07 RX ORDER — SODIUM CHLORIDE 9 MG/ML
500 INJECTION, SOLUTION INTRAVENOUS
Status: DISCONTINUED | OUTPATIENT
Start: 2019-03-07 | End: 2019-03-07 | Stop reason: HOSPADM

## 2019-03-07 NOTE — PROCEDURES
Bone Marrow Biopsy/Aspiration  Date/Time: 3/7/2019 11:14 AM  Performed by: SHANIQUE SARAVIA  Authorized by: SHANIQUE SARAVIA     Consent:     Consent obtained:  Verbal    Consent given by:  Patient    Risks discussed:  Bleeding, infection, pain, nerve damage and repeat procedure    Alternatives discussed:  No treatment  Universal protocol:     Procedure explained and questions answered to patient or proxy's satisfaction: yes      Relevant documents present and verified: yes      Test results available and properly labeled: yes      Imaging studies available: yes      Required blood products, implants, devices, and special equipment available: yes      Immediately prior to procedure a time out was called: yes      Site/side marked: yes      Patient identity confirmed:  Verbally with patient  Pre-procedure details:     Procedure type:  Aspiration and biopsy    Requesting physician:  Dr NORMAN    Indications:  Leukopenia, Thrombocytopenia     Position:  Prone    Buttock laterality:  Left    Local anesthetic:  1% Lidocaine    Subcutaneous volume:  1 mL    Periosteum anesthetic volume:  9 mL    Preparation: Patient was prepped and draped in usual sterile fashion    Sedation:     Patient Sedated: Yes      Sedation type: moderate (conscious) sedation      Sedation:  Midazolam    Analgesia:  Fentanyl    Sedation length:  15 minutes  Procedure details:     Aspirate obtained:  5 mL followed by 5 mL    Biopsy performed:  1 core    Number of attempts:  2    Estimated blood loss (mL):  1  Post-procedure:     Puncture site:  Adhesive bandage applied and direct pressure applied    Patient tolerance of procedure:  Tolerated well, no immediate complications  Comments:      4mg of versed give  100 mcg fentanyl

## 2019-03-07 NOTE — DISCHARGE INSTRUCTIONS
BONE MARROW ASPIRATION & BIOPSY DISCHARGE INSTRUCTIONS    1. After the numbing medicine wears off, you may feel some discomfort.    2. Keep bandage clean and dry for 24 hours.  After this time, you can change the bandage.  You may now bathe or shower.    3. If bleeding occurs after your bone marrow aspiration or biopsy, apply pressure to the area and call your doctor immediately.    4. Call your doctor if pain persists for greater than 24 hours in the area where you had your aspiration or biopsy.    5. Call your doctor immediately if you notice redness or drainage in the area or if you have a fever.    6. Call your doctor if you have numbness or weakness in the area where the doctor took the bone marrow or down your leg.    7. Do not drive or drink alcohol for 24 hours if you have had sedation medication.  The medication will make you drowsy.    8. Resume your regular diet.    9. Follow up with your doctor.      I acknowledge receipt and understanding of these Home Care Instructions.

## 2019-03-07 NOTE — OR NURSING
1152- Pt arrives from endo s/p bone marrow biopsy.  Pt denies pain, scant amount of drainage to bandaid.  Pt tolerating sips of water.    1155- Pt and  provided with d/c paperwork and instructions, no scripts.  All questions answered.    1158- Pt taken out via w/c.

## 2019-03-12 ENCOUNTER — HOSPITAL ENCOUNTER (OUTPATIENT)
Dept: RADIOLOGY | Facility: MEDICAL CENTER | Age: 54
End: 2019-03-12
Attending: INTERNAL MEDICINE
Payer: COMMERCIAL

## 2019-03-12 DIAGNOSIS — D69.6 THROMBOCYTOPENIA (HCC): ICD-10-CM

## 2019-03-12 PROCEDURE — 700117 HCHG RX CONTRAST REV CODE 255: Performed by: INTERNAL MEDICINE

## 2019-03-12 PROCEDURE — 71260 CT THORAX DX C+: CPT

## 2019-03-12 RX ADMIN — IOHEXOL 100 ML: 350 INJECTION, SOLUTION INTRAVENOUS at 12:50

## 2019-03-12 RX ADMIN — IOHEXOL 50 ML: 240 INJECTION, SOLUTION INTRATHECAL; INTRAVASCULAR; INTRAVENOUS; ORAL at 12:50

## 2019-03-13 NOTE — ADDENDUM NOTE
Encounter addended by: Shahbaz Silvestre R.N. on: 3/13/2019  3:08 PM<BR>    Actions taken: Visit Navigator Flowsheet section accepted

## 2019-03-13 NOTE — ADDENDUM NOTE
Encounter addended by: Shahbaz Silvestre R.N. on: 3/13/2019  3:16 PM<BR>    Actions taken: Visit Navigator Flowsheet section accepted

## 2019-03-19 ENCOUNTER — OFFICE VISIT (OUTPATIENT)
Dept: HEMATOLOGY ONCOLOGY | Facility: MEDICAL CENTER | Age: 54
End: 2019-03-19
Payer: COMMERCIAL

## 2019-03-19 VITALS
OXYGEN SATURATION: 98 % | HEART RATE: 96 BPM | RESPIRATION RATE: 16 BRPM | BODY MASS INDEX: 24.56 KG/M2 | SYSTOLIC BLOOD PRESSURE: 132 MMHG | TEMPERATURE: 98.6 F | DIASTOLIC BLOOD PRESSURE: 80 MMHG | WEIGHT: 162.04 LBS | HEIGHT: 68 IN

## 2019-03-19 DIAGNOSIS — D69.6 THROMBOCYTOPENIA (HCC): ICD-10-CM

## 2019-03-19 DIAGNOSIS — R63.4 WEIGHT LOSS: ICD-10-CM

## 2019-03-19 DIAGNOSIS — F41.9 ANXIETY: ICD-10-CM

## 2019-03-19 PROCEDURE — 99215 OFFICE O/P EST HI 40 MIN: CPT | Performed by: INTERNAL MEDICINE

## 2019-03-19 ASSESSMENT — PAIN SCALES - GENERAL: PAINLEVEL: NO PAIN

## 2019-03-19 NOTE — PROGRESS NOTES
"Date of visit: 3/19/2019  10:10 AM      Chief Complaint- Thrombocytopenia, leukopenia.        HPI-  Seen by Dr. Cheng before.  -\"53 y.o. year old woman followed for a seizure disorder, anxiety, possible newly diagnosed rheumatoid condition and history of alcoholism, who was noted to have persistent moderate thrombocytopenia and leukopenia on her complete blood counts dating back to June 2017 at which time her platelets were 46,000 and her absolute neutrophil count was 1270. In February of 2018 her CBC showed mild leukopenia with ANC of 1700, anemia with hemoglobin of 10.6 and platelets of 89,000. In November of 2018 CBC showed white count of 2.9 with ANC of 1470, normal hemoglobin and MCV and platelets of 94,000. She reports prior drinking history however denies alcohol for the past 12 months. She is currently undergoing an evaluation for possible SLE however reports not taking any rheumatoid drugs. She is also being treated for a seizure disorder and states that she took an antiepileptic for only the past two months however. She denies bruising or bleeding. She denies prior diagnosis of liver disease. She denies current constitutional symptoms such as fevers, chills or night sweats, however, she reports a recent 10 pound weight loss      Interim history  -She was seen by [place] [facility] who recommended a bone marrow biopsy to rule out any primary hematological problems.  The bone marrow biopsy came back showing no significant abnormalities  Normal cellular bone marrow demonstrating:         -Trilineage hematopoiesis with maturation.         -Focal dysmegakaryocytopoiesis.         -Mild dysgranulopoiesis.         -No significant dyserythropoiesis is seen.         -No morphologic increase in blast cells.         -There is no unequivocal morphologic or cytogenetic evidence for          a myelodysplastic syndrome.         -Two tiny benign appearing lymphoid aggregates within the core          biopsy and clot " section         -Trace to absent stainable bone marrow iron stores.  Flow cytometry done on the bone  marrow aspirate demonstrated no diagnostic immunophenotypic  abnormalities detected. Mature B-cells (9% of lymphoid cells) are  polyclonal. CD34+ events (1.7% of total cells) are not increased.  Plasma cells are not increased and show unremarkable surface marker  expression.     CT of the chest abdomen pelvis done due to weight loss issues-No acute cardiopulmonary disease.  2.  Splenomegaly, nonspecific.  3.  Fatty infiltration of liver with probable focal fatty infiltration adjacent to gallbladder fossa.  4.  Probable RIGHT lobe liver cysts although lesion appears ill-defined on axial images which may be due to artifact.  5.  Numerous tiny low-attenuation lesions throughout both kidneys which are new from prior exam and may indicate small cortical cysts although tumor infiltration is not excluded.  Prominent simple cysts again noted in both kidneys which are slightly   increased in size from prior exam.    CBC shows improved platelet count.  She continues to have chronic fatigue issues and some weight loss  Past Medical History:      Past Medical History:   Diagnosis Date   • Alcoholic hepatitis    • Sebaceous cyst 9/12/2018   • Seizure cerebral 6/22/2018   • Thrombocytopenia (HCC)        Past surgical history:       Past Surgical History:   Procedure Laterality Date   • BONE MARROW ASPIRATION Left 3/7/2019    Procedure: BONE MARROW ASPIRATION - ESTER;  Surgeon: Tiffanie Osullivan M.D.;  Location: ENDOSCOPY [facility];  Service: Orthopedics   • BONE MARROW BIOPSY, NDL/TROCAR Left 3/7/2019    Procedure: BONE MARROW BIOPSY, NDL/TROCAR;  Surgeon: Tiffanie Osullivan M.D.;  Location: ENDOSCOPY [facility];  Service: Orthopedics       Allergies:       Patient has no known allergies.    Medications:         Current Outpatient Prescriptions   Medication Sig Dispense Refill   • omeprazole (PRILOSEC) 20 MG delayed-release  capsule Take 1 Cap by mouth every day. 90 Cap 1   • acyclovir (ZOVIRAX) 400 MG tablet Take 1 Tab by mouth 2 times a day. 60 Tab 1   • multivitamin (THERAGRAN) Tab Take 1 Tab by mouth every day. 30 Tab 0   • hydrOXYzine HCl (ATARAX) 25 MG Tab Take 1 Tab by mouth 1 time daily as needed for Anxiety for up to 30 days. 30 Tab 0   • ibuprofen (MOTRIN) 200 MG Tab Take 200 mg by mouth every 6 hours as needed.       No current facility-administered medications for this visit.          Social History:     Social History     Social History   • Marital status:      Spouse name: N/A   • Number of children: N/A   • Years of education: N/A     Occupational History   • Not on file.     Social History Main Topics   • Smoking status: Current Every Day Smoker     Packs/day: 1.00     Years: 5.00   • Smokeless tobacco: Never Used      Comment: smoked in her teens    • Alcohol use No   • Drug use: No   • Sexual activity: Not Currently     Partners: Male     Other Topics Concern   • Not on file     Social History Narrative    Retail        Family History:      Family History   Problem Relation Age of Onset   • Hypertension Mother    • Alcohol/Drug Mother    • Breast Cancer Mother    • Hypertension Father    • Alcohol/Drug Father    • Autoimmune Disease Daughter         lupus   • Autoimmune Disease Son         colitis       Review of Systems:  All other review of systems are negative except what was mentioned above in the HPI.    Constitutional: Negative for fever, chills, weight loss and malaise/fatigue.    HEENT: No new auditory or visual complaints. No sore throat and neck pain.     Respiratory: Negative for cough, sputum production, shortness of breath and wheezing.    Cardiovascular: Negative for chest pain, palpitations, orthopnea and leg swelling.    Gastrointestinal: Negative for heartburn, nausea, vomiting and abdominal pain.    Genitourinary: Negative for dysuria, hematuria    Musculoskeletal: No new arthralgias or  "myalgias   Skin: Negative for rash and itching.    Neurological: Negative for focal weakness and headaches.    Endo/Heme/Allergies: No abnormal bleed/bruise.    Psychiatric/Behavioral: No new depression/anxiety.    Physical Exam:  Vitals: /80   Pulse 96   Temp 37 °C (98.6 °F)   Resp 16   Ht 1.727 m (5' 8\")   Wt 73.5 kg (162 lb 0.6 oz)   SpO2 98%   BMI 24.64 kg/m²     General: Not in acute distress, alert and oriented x 3  HEENT: No pallor, icterus. Oropharynx clear.   Neck: Supple, no palpable masses.  Lymph nodes: No palpable cervical, supraclavicular, axillary or inguinal lymphadenopathy.    CVS: regular rate and rhythm, no rubs or gallops  RESP: Clear to auscultate bilaterally, no wheezing or crackles.   ABD: Soft, non tender, non distended, positive bowel sounds, no palpable organomegaly  EXT: No edema or cyanosis  CNS: Alert and oriented x3, No focal deficits.  Skin- No rash      Labs:   No visits with results within 1 Week(s) from this visit.   Latest known visit with results is:   Admission on 03/07/2019, Discharged on 03/07/2019   Component Date Value Ref Range Status   • WBC 03/07/2019 3.7* 4.8 - 10.8 K/uL Final   • RBC 03/07/2019 4.18* 4.20 - 5.40 M/uL Final   • Hemoglobin 03/07/2019 13.4  12.0 - 16.0 g/dL Final   • Hematocrit 03/07/2019 40.5  37.0 - 47.0 % Final   • MCV 03/07/2019 96.9  81.4 - 97.8 fL Final   • MCH 03/07/2019 32.1  27.0 - 33.0 pg Final   • MCHC 03/07/2019 33.1* 33.6 - 35.0 g/dL Final   • RDW 03/07/2019 52.4* 35.9 - 50.0 fL Final   • Platelet Count 03/07/2019 113* 164 - 446 K/uL Final   • MPV 03/07/2019 9.8  9.0 - 12.9 fL Final   • Neutrophils-Polys 03/07/2019 58.30  44.00 - 72.00 % Final   • Lymphocytes 03/07/2019 26.40  22.00 - 41.00 % Final   • Monocytes 03/07/2019 10.40  0.00 - 13.40 % Final   • Eosinophils 03/07/2019 2.50  0.00 - 6.90 % Final   • Basophils 03/07/2019 1.90* 0.00 - 1.80 % Final   • Immature Granulocytes 03/07/2019 0.50  0.00 - 0.90 % Final   • Nucleated RBC " 03/07/2019 0.00  /100 WBC Final   • Neutrophils (Absolute) 03/07/2019 2.14  2.00 - 7.15 K/uL Final    Includes immature neutrophils, if present.   • Lymphs (Absolute) 03/07/2019 0.97* 1.00 - 4.80 K/uL Final   • Monos (Absolute) 03/07/2019 0.38  0.00 - 0.85 K/uL Final   • Eos (Absolute) 03/07/2019 0.09  0.00 - 0.51 K/uL Final   • Baso (Absolute) 03/07/2019 0.07  0.00 - 0.12 K/uL Final   • Immature Granulocytes (abs) 03/07/2019 0.02  0.00 - 0.11 K/uL Final   • NRBC (Absolute) 03/07/2019 0.00  K/uL Final   • Reticulocyte Count 03/07/2019 3.1* 0.8 - 2.1 % Final   • Retic, Absolute 03/07/2019 0.13* 0.04 - 0.06 M/uL Final   • Imm. Reticulocyte Fraction 03/07/2019 16.5  9.3 - 17.4 % Final   • Retic Hgb Equivalent 03/07/2019 32.7  29.0 - 35.0 pg/cell Final   • Pathology Request 03/07/2019 Sent to Histo   Final             Assessment and Plan:    53 y.o. year old woman with past medical history of a seizure disorder, s/p recent initiation of Zonegran, possible SLE diagnosis with final recommendations still pending, as well as past history of alcoholism, who was noted to have persistent and recently improving thrombocytopenia and mild neutropenia dating back to at least June of 2017 with platelet increase of 46,000 at that genesis to 94,000 back in December.     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  Discussed with her about alcohol cessation.  Informed her that most of the cytopenias appears to be related to autoimmune issues/alcoholism.  She has not has significant anemia however given the absent iron stores in the bone marrow instructed to take iron tablets 1 tablet alternate days.    Reviewed the CT scan which does not reveal any evidence of malignancy.  She has some fatty liver and mild splenomegaly which could be associated with the fatty liver or other autoimmune issues.    Overall,  reassured her that no clear-cut primary hematological/oncological causes could be found to explain her symptoms.  She will follow-up with [place] [facility].  We will see her back in 6 months to keep an eye on her blood counts    She agreed and verbalized  agreement and understanding with the current plan.  I answered all questions and concerns at this time         Please note that this dictation was created using voice recognition software. I have made every reasonable attempt to correct obvious errors, but I expect that there are errors of grammar and possibly content that I did not discover before finalizing the note.      SIGNATURES:  Ian Gibbs    CC:  Shorty Ibarra M.D.  No ref. provider found

## 2019-03-19 NOTE — LETTER
"     [facility]   [address] [place] [address], [address]  [place] [address]  Phone: [phone]  Fax: [phone]              Zari Day  1965    Encounter Date: 3/19/2019    Ian Gibbs M.D.          PROGRESS NOTE:  Date of visit: 3/19/2019  10:10 AM      Chief Complaint- Thrombocytopenia, leukopenia.        HPI-  Seen by Dr. Cheng before.  -\"53 y.o. year old woman followed for a seizure disorder, anxiety, possible newly diagnosed rheumatoid condition and history of alcoholism, who was noted to have persistent moderate thrombocytopenia and leukopenia on her complete blood counts dating back to June 2017 at which time her platelets were 46,000 and her absolute neutrophil count was 1270. In February of 2018 her CBC showed mild leukopenia with ANC of 1700, anemia with hemoglobin of 10.6 and platelets of 89,000. In November of 2018 CBC showed white count of 2.9 with ANC of 1470, normal hemoglobin and MCV and platelets of 94,000. She reports prior drinking history however denies alcohol for the past 12 months. She is currently undergoing an evaluation for possible SLE however reports not taking any rheumatoid drugs. She is also being treated for a seizure disorder and states that she took an antiepileptic for only the past two months however. She denies bruising or bleeding. She denies prior diagnosis of liver disease. She denies current constitutional symptoms such as fevers, chills or night sweats, however, she reports a recent 10 pound weight loss      Interim history  -She was seen by [facility] who recommended a bone marrow biopsy to rule out any primary hematological problems.  The bone marrow biopsy came back showing no significant abnormalities  Normal cellular bone marrow demonstrating:         -Trilineage hematopoiesis with maturation.         -Focal dysmegakaryocytopoiesis.         -Mild dysgranulopoiesis.         -No significant dyserythropoiesis is seen.         -No " morphologic increase in blast cells.         -There is no unequivocal morphologic or cytogenetic evidence for          a myelodysplastic syndrome.         -Two tiny benign appearing lymphoid aggregates within the core          biopsy and clot section         -Trace to absent stainable bone marrow iron stores.  Flow cytometry done on the bone  marrow aspirate demonstrated no diagnostic immunophenotypic  abnormalities detected. Mature B-cells (9% of lymphoid cells) are  polyclonal. CD34+ events (1.7% of total cells) are not increased.  Plasma cells are not increased and show unremarkable surface marker  expression.     CT of the chest abdomen pelvis done due to weight loss issues-No acute cardiopulmonary disease.  2.  Splenomegaly, nonspecific.  3.  Fatty infiltration of liver with probable focal fatty infiltration adjacent to gallbladder fossa.  4.  Probable RIGHT lobe liver cysts although lesion appears ill-defined on axial images which may be due to artifact.  5.  Numerous tiny low-attenuation lesions throughout both kidneys which are new from prior exam and may indicate small cortical cysts although tumor infiltration is not excluded.  Prominent simple cysts again noted in both kidneys which are slightly   increased in size from prior exam.    CBC shows improved platelet count.  She continues to have chronic fatigue issues and some weight loss  Past Medical History:      Past Medical History:   Diagnosis Date   • Alcoholic hepatitis    • Sebaceous cyst 9/12/2018   • Seizure cerebral 6/22/2018   • Thrombocytopenia (HCC)        Past surgical history:       Past Surgical History:   Procedure Laterality Date   • BONE MARROW ASPIRATION Left 3/7/2019    Procedure: BONE MARROW ASPIRATION - ESTER;  Surgeon: Tiffanie Osullivan M.D.;  Location[facility];  Service: Orthopedics   • BONE MARROW BIOPSY, NDL/TROCAR Left 3/7/2019    Procedure: BONE MARROW BIOPSY, NDL/TROCAR;  Surgeon: Tiffanie Osullivan M.D.;  Location:  ENDOSCOPY [facility];  Service: Orthopedics       Allergies:       Patient has no known allergies.    Medications:         Current Outpatient Prescriptions   Medication Sig Dispense Refill   • omeprazole (PRILOSEC) 20 MG delayed-release capsule Take 1 Cap by mouth every day. 90 Cap 1   • acyclovir (ZOVIRAX) 400 MG tablet Take 1 Tab by mouth 2 times a day. 60 Tab 1   • multivitamin (THERAGRAN) Tab Take 1 Tab by mouth every day. 30 Tab 0   • hydrOXYzine HCl (ATARAX) 25 MG Tab Take 1 Tab by mouth 1 time daily as needed for Anxiety for up to 30 days. 30 Tab 0   • ibuprofen (MOTRIN) 200 MG Tab Take 200 mg by mouth every 6 hours as needed.       No current facility-administered medications for this visit.          Social History:     Social History     Social History   • Marital status:      Spouse name: N/A   • Number of children: N/A   • Years of education: N/A     Occupational History   • Not on file.     Social History Main Topics   • Smoking status: Current Every Day Smoker     Packs/day: 1.00     Years: 5.00   • Smokeless tobacco: Never Used      Comment: smoked in her teens    • Alcohol use No   • Drug use: No   • Sexual activity: Not Currently     Partners: Male     Other Topics Concern   • Not on file     Social History Narrative    Retail        Family History:      Family History   Problem Relation Age of Onset   • Hypertension Mother    • Alcohol/Drug Mother    • Breast Cancer Mother    • Hypertension Father    • Alcohol/Drug Father    • Autoimmune Disease Daughter         lupus   • Autoimmune Disease Son         colitis       Review of Systems:  All other review of systems are negative except what was mentioned above in the HPI.    Constitutional: Negative for fever, chills, weight loss and malaise/fatigue.    HEENT: No new auditory or visual complaints. No sore throat and neck pain.     Respiratory: Negative for cough, sputum production, shortness of breath and wheezing.    Cardiovascular:  "Negative for chest pain, palpitations, orthopnea and leg swelling.    Gastrointestinal: Negative for heartburn, nausea, vomiting and abdominal pain.    Genitourinary: Negative for dysuria, hematuria    Musculoskeletal: No new arthralgias or myalgias   Skin: Negative for rash and itching.    Neurological: Negative for focal weakness and headaches.    Endo/Heme/Allergies: No abnormal bleed/bruise.    Psychiatric/Behavioral: No new depression/anxiety.    Physical Exam:  Vitals: /80   Pulse 96   Temp 37 °C (98.6 °F)   Resp 16   Ht 1.727 m (5' 8\")   Wt 73.5 kg (162 lb 0.6 oz)   SpO2 98%   BMI 24.64 kg/m²      General: Not in acute distress, alert and oriented x 3  HEENT: No pallor, icterus. Oropharynx clear.   Neck: Supple, no palpable masses.  Lymph nodes: No palpable cervical, supraclavicular, axillary or inguinal lymphadenopathy.    CVS: regular rate and rhythm, no rubs or gallops  RESP: Clear to auscultate bilaterally, no wheezing or crackles.   ABD: Soft, non tender, non distended, positive bowel sounds, no palpable organomegaly  EXT: No edema or cyanosis  CNS: Alert and oriented x3, No focal deficits.  Skin- No rash      Labs:   No visits with results within 1 Week(s) from this visit.   Latest known visit with results is:   Admission on 03/07/2019, Discharged on 03/07/2019   Component Date Value Ref Range Status   • WBC 03/07/2019 3.7* 4.8 - 10.8 K/uL Final   • RBC 03/07/2019 4.18* 4.20 - 5.40 M/uL Final   • Hemoglobin 03/07/2019 13.4  12.0 - 16.0 g/dL Final   • Hematocrit 03/07/2019 40.5  37.0 - 47.0 % Final   • MCV 03/07/2019 96.9  81.4 - 97.8 fL Final   • MCH 03/07/2019 32.1  27.0 - 33.0 pg Final   • MCHC 03/07/2019 33.1* 33.6 - 35.0 g/dL Final   • RDW 03/07/2019 52.4* 35.9 - 50.0 fL Final   • Platelet Count 03/07/2019 113* 164 - 446 K/uL Final   • MPV 03/07/2019 9.8  9.0 - 12.9 fL Final   • Neutrophils-Polys 03/07/2019 58.30  44.00 - 72.00 % Final   • Lymphocytes 03/07/2019 26.40  22.00 - 41.00 % " Final   • Monocytes 03/07/2019 10.40  0.00 - 13.40 % Final   • Eosinophils 03/07/2019 2.50  0.00 - 6.90 % Final   • Basophils 03/07/2019 1.90* 0.00 - 1.80 % Final   • Immature Granulocytes 03/07/2019 0.50  0.00 - 0.90 % Final   • Nucleated RBC 03/07/2019 0.00  /100 WBC Final   • Neutrophils (Absolute) 03/07/2019 2.14  2.00 - 7.15 K/uL Final    Includes immature neutrophils, if present.   • Lymphs (Absolute) 03/07/2019 0.97* 1.00 - 4.80 K/uL Final   • Monos (Absolute) 03/07/2019 0.38  0.00 - 0.85 K/uL Final   • Eos (Absolute) 03/07/2019 0.09  0.00 - 0.51 K/uL Final   • Baso (Absolute) 03/07/2019 0.07  0.00 - 0.12 K/uL Final   • Immature Granulocytes (abs) 03/07/2019 0.02  0.00 - 0.11 K/uL Final   • NRBC (Absolute) 03/07/2019 0.00  K/uL Final   • Reticulocyte Count 03/07/2019 3.1* 0.8 - 2.1 % Final   • Retic, Absolute 03/07/2019 0.13* 0.04 - 0.06 M/uL Final   • Imm. Reticulocyte Fraction 03/07/2019 16.5  9.3 - 17.4 % Final   • Retic Hgb Equivalent 03/07/2019 32.7  29.0 - 35.0 pg/cell Final   • Pathology Request 03/07/2019 Sent to Histo   Final             Assessment and Plan:    53 y.o. year old woman with past medical history of a seizure disorder, s/p recent initiation of Zonegran, possible SLE diagnosis with final recommendations still pending, as well as past history of alcoholism, who was noted to have persistent and recently improving thrombocytopenia and mild neutropenia dating back to at least June of 2017 with platelet increase of 46,000 at that genesis to 94,000 back in December.     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  Discussed with her about alcohol cessation.  Informed her that most of the cytopenias appears to be related to autoimmune issues/alcoholism.  She has not has significant anemia however given the absent iron stores in the bone marrow instructed to take iron  tablets 1 tablet alternate days.    Reviewed the CT scan which does not reveal any evidence of malignancy.  She has some fatty liver and mild splenomegaly which could be associated with the fatty liver or other autoimmune issues.    Overall, reassured her that no clear-cut primary hematological/oncological causes could be found to explain her symptoms.  She will follow-up with [facility].  We will see her back in 6 months to keep an eye on her blood counts    She agreed and verbalized  agreement and understanding with the current plan.  I answered all questions and concerns at this time         Please note that this dictation was created using voice recognition software. I have made every reasonable attempt to correct obvious errors, but I expect that there are errors of grammar and possibly content that I did not discover before finalizing the note.      SIGNATURES:  Ian Gibbs    CC:  Shorty Ibarra M.D.  No ref. provider found        No Recipients

## 2019-03-23 DIAGNOSIS — Z86.19 H/O COLD SORES: ICD-10-CM

## 2019-03-25 RX ORDER — ACYCLOVIR 400 MG/1
TABLET ORAL
Qty: 180 TAB | Refills: 1 | Status: SHIPPED | OUTPATIENT
Start: 2019-03-25 | End: 2019-06-03

## 2019-03-28 RX ORDER — ALPRAZOLAM 0.5 MG/1
0.5 TABLET ORAL
Qty: 30 TAB | OUTPATIENT
Start: 2019-03-28 | End: 2019-04-27

## 2019-04-17 ENCOUNTER — TELEPHONE (OUTPATIENT)
Dept: NEUROLOGY | Facility: MEDICAL CENTER | Age: 54
End: 2019-04-17

## 2019-04-17 NOTE — TELEPHONE ENCOUNTER
Patient called stating that she felt like she was having active seizures for the past few days. Patient was hard to understand as she was slurring her words, and said she could no think straight. I advised her to go to the ER. She stated that she wanted go to the facility across the street from the [facility], which is [facility]. I advised that she would more than likely be transferred to us if she was having active symptoms. She said she would call her mom for a ride in to [employer]. I called back 5 minutes later to see if she had reached her mom and got no answer. Tried again and still no answer.      Mom's number is not in the chart. Ex 's number is in chart but was told to not call or discuss any info about the patient with him.

## 2019-05-03 ENCOUNTER — OFFICE VISIT (OUTPATIENT)
Dept: NEUROLOGY | Facility: MEDICAL CENTER | Age: 54
End: 2019-05-03
Payer: COMMERCIAL

## 2019-05-03 VITALS
BODY MASS INDEX: 24.4 KG/M2 | HEIGHT: 68 IN | DIASTOLIC BLOOD PRESSURE: 80 MMHG | OXYGEN SATURATION: 94 % | HEART RATE: 85 BPM | TEMPERATURE: 97 F | WEIGHT: 161 LBS | SYSTOLIC BLOOD PRESSURE: 134 MMHG

## 2019-05-03 DIAGNOSIS — G40.909 SEIZURE CEREBRAL (HCC): ICD-10-CM

## 2019-05-03 DIAGNOSIS — G05.3 AUTOIMMUNE CEREBRITIS (HCC): ICD-10-CM

## 2019-05-03 DIAGNOSIS — M35.9 AUTOIMMUNE CEREBRITIS (HCC): ICD-10-CM

## 2019-05-03 DIAGNOSIS — D69.6 THROMBOCYTOPENIA (HCC): ICD-10-CM

## 2019-05-03 PROCEDURE — 99214 OFFICE O/P EST MOD 30 MIN: CPT | Performed by: PSYCHIATRY & NEUROLOGY

## 2019-05-03 RX ORDER — ZONISAMIDE 100 MG/1
300 CAPSULE ORAL DAILY
Qty: 90 CAP | Refills: 3 | Status: SHIPPED | OUTPATIENT
Start: 2019-05-03 | End: 2019-06-12
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
## 2019-05-03 NOTE — PATIENT INSTRUCTIONS
IMPRESSION:     1. 2 Seizures, One confusion--2017-- the patient stopped zonegran months ago-- the cognition and head discomforts worsened  2. Hx of head injury 2006  3. Hx of alcoholic -- quit alcohol since Nov 2017  4. Hx of waking up with biting down on the right inner cheek and tongue-- decreased with zonegran  5. Probable autoimmune disease-- Lupus? Going to see another rheumatologist in [place]--- the diagnosis is pending  6.CNS cereberitis?  7.Stress and Anxiety    PLAN/RECOMMENDATIONS:      Retake Zonegran 300mg--- without zonegran, the patient's cognition and head discomforts worsened  If side effects, please stop, and notify us  If any more spells of morning confusion or blood in the mouth, please notify us    We will offer Spinal tap this time to exclude CNS inflammation            SIGNATURE:  Malcolm Aguirre      CC:  EVAN Alicia

## 2019-05-07 ENCOUNTER — TELEPHONE (OUTPATIENT)
Dept: NEUROLOGY | Facility: MEDICAL CENTER | Age: 54
End: 2019-05-07

## 2019-05-07 NOTE — TELEPHONE ENCOUNTER
Pt called and LVM stating she has been having problems with Rx Dr. Aguirre wanted her to take ZONEGRAN -- I called back and LVM advising pt to stop taking medication and to call me back, leave me a detailed VM, or message me on mychart on what problems/side effects she's been having with ZONEGRAN so I can document and notify Dr. Aguirre.

## 2019-05-17 ENCOUNTER — HOSPITAL ENCOUNTER (OUTPATIENT)
Facility: MEDICAL CENTER | Age: 54
End: 2019-05-17
Attending: NURSE PRACTITIONER
Payer: COMMERCIAL

## 2019-05-17 ENCOUNTER — OFFICE VISIT (OUTPATIENT)
Dept: URGENT CARE | Facility: PHYSICIAN GROUP | Age: 54
End: 2019-05-17
Payer: COMMERCIAL

## 2019-05-17 VITALS
WEIGHT: 162 LBS | SYSTOLIC BLOOD PRESSURE: 138 MMHG | RESPIRATION RATE: 16 BRPM | OXYGEN SATURATION: 95 % | BODY MASS INDEX: 24.63 KG/M2 | HEART RATE: 95 BPM | DIASTOLIC BLOOD PRESSURE: 84 MMHG | TEMPERATURE: 98.3 F

## 2019-05-17 DIAGNOSIS — T74.21XA SEXUAL ASSAULT OF ADULT, INITIAL ENCOUNTER: ICD-10-CM

## 2019-05-17 PROCEDURE — 99214 OFFICE O/P EST MOD 30 MIN: CPT | Performed by: NURSE PRACTITIONER

## 2019-05-17 PROCEDURE — 87591 N.GONORRHOEAE DNA AMP PROB: CPT

## 2019-05-17 PROCEDURE — 87510 GARDNER VAG DNA DIR PROBE: CPT

## 2019-05-17 PROCEDURE — 87480 CANDIDA DNA DIR PROBE: CPT

## 2019-05-17 PROCEDURE — 87660 TRICHOMONAS VAGIN DIR PROBE: CPT

## 2019-05-17 PROCEDURE — 87491 CHLMYD TRACH DNA AMP PROBE: CPT

## 2019-05-17 ASSESSMENT — ENCOUNTER SYMPTOMS
BLURRED VISION: 0
VOMITING: 0
CHILLS: 0
NAUSEA: 0
COUGH: 1
STRIDOR: 0
SORE THROAT: 0
PALPITATIONS: 0
HEADACHES: 0
ABDOMINAL PAIN: 0
DIARRHEA: 0
WHEEZING: 0
DOUBLE VISION: 0
DIZZINESS: 0
FLANK PAIN: 0
CONSTIPATION: 0
MUSCULOSKELETAL NEGATIVE: 1
FEVER: 0

## 2019-05-18 DIAGNOSIS — T74.21XA SEXUAL ASSAULT OF ADULT, INITIAL ENCOUNTER: ICD-10-CM

## 2019-05-18 LAB
CANDIDA DNA VAG QL PROBE+SIG AMP: NEGATIVE
G VAGINALIS DNA VAG QL PROBE+SIG AMP: NEGATIVE
T VAGINALIS DNA VAG QL PROBE+SIG AMP: NEGATIVE
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
## 2019-05-20 ENCOUNTER — TELEPHONE (OUTPATIENT)
Dept: URGENT CARE | Facility: CLINIC | Age: 54
End: 2019-05-20

## 2019-05-20 LAB
C TRACH DNA SPEC QL NAA+PROBE: NEGATIVE
N GONORRHOEA DNA SPEC QL NAA+PROBE: NEGATIVE
SPECIMEN SOURCE: NORMAL

## 2019-05-20 NOTE — TELEPHONE ENCOUNTER
Called and left voicemail for patient with negative vaginal pathogens and G/C swab results. She is to call back with any questions/concerns.

## 2019-06-03 ENCOUNTER — OFFICE VISIT (OUTPATIENT)
Dept: MEDICAL GROUP | Facility: PHYSICIAN GROUP | Age: 54
End: 2019-06-03
Payer: COMMERCIAL

## 2019-06-03 VITALS
OXYGEN SATURATION: 100 % | RESPIRATION RATE: 16 BRPM | TEMPERATURE: 97.9 F | DIASTOLIC BLOOD PRESSURE: 74 MMHG | HEIGHT: 68 IN | SYSTOLIC BLOOD PRESSURE: 118 MMHG | HEART RATE: 107 BPM | WEIGHT: 162 LBS | BODY MASS INDEX: 24.55 KG/M2

## 2019-06-03 DIAGNOSIS — D69.6 THROMBOCYTOPENIA (HCC): ICD-10-CM

## 2019-06-03 DIAGNOSIS — G40.909 SEIZURE CEREBRAL (HCC): ICD-10-CM

## 2019-06-03 PROCEDURE — 99214 OFFICE O/P EST MOD 30 MIN: CPT | Performed by: INTERNAL MEDICINE

## 2019-06-03 NOTE — PROGRESS NOTES
PRIMARY CARE CLINIC FOLLOW UP VISIT  Chief Complaint   Patient presents with   • Other     CT from Dr. Gibbs    • Seizure     5/31 and 6/1/19 Poss damage to tounge      History of Present Illness     Here with her :     Seizure cerebral  She last saw her neurologist 5/3/2019 at which time she wasn't taking zonegran. Dr. Aguirre had wanted her to slowly resume the zonegran to 300 mg daily but she started having mood lability while still on the 200 mg dose. She was having depression/lethargy on the zonegran. They called Dr. Aguirre's office and were told to stop the zonegran. Her most recent seizure was 2 days ago with tongue biting.  reports that she was still having seizures even while on the 200 mg zonegran dose. She bit her tongue quite severely and hasn't been able to eat again.     Thrombocytopenia (HCC)  Following with Dr. Gibbs, he had reviewed with her that the CT scan didn't show any malignancy and he had felt the cytopenias were related to some possible autoimmune process. [facility] wanted to follow up with her when she was symptomatic.     Current Outpatient Prescriptions   Medication Sig Dispense Refill   • lidocaine viscous 2% (XYLOCAINE) 2 % Solution Take 15 mL by mouth as needed for Throat/Mouth Pain. 120 mL 0   • zonisamide (ZONEGRAN) 100 MG Cap Take 3 Caps by mouth every day. 90 Cap 3   • ibuprofen (MOTRIN) 200 MG Tab Take 200 mg by mouth every 6 hours as needed.       No current facility-administered medications for this visit.      Past Medical History:   Diagnosis Date   • Alcoholic hepatitis    • Sebaceous cyst 9/12/2018   • Seizure cerebral 6/22/2018   • Thrombocytopenia (HCC)      Past Surgical History:   Procedure Laterality Date   • BONE MARROW ASPIRATION Left 3/7/2019    Procedure: BONE MARROW ASPIRATION - ESTER;  Surgeon: Tiffanie Osullivan M.D.;  Location: [facility];  Service: Orthopedics   • BONE MARROW BIOPSY, NDL/TROCAR Left 3/7/2019    Procedure: BONE  "MARROW BIOPSY, NDL/TROCAR;  Surgeon: Tiffanie Osullivan M.D.;  Location: [facility];  Service: Orthopedics     Social History   Substance Use Topics   • Smoking status: Current Every Day Smoker     Packs/day: 1.00     Years: 5.00   • Smokeless tobacco: Never Used      Comment: smoked in her teens    • Alcohol use No     Social History     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    ROS  As per HPI above. All other systems reviewed and negative.        Objective   /74   Pulse (!) 107   Temp 36.6 °C (97.9 °F)   Resp 16   Ht 1.727 m (5' 8\")   Wt 73.5 kg (162 lb)   SpO2 100%  Body mass index is 24.63 kg/m².    General: alert and oriented, pleasant, cooperative  HEENT: Normocephalic, atraumatic. Lacerations of right aspect of tongue   Psychiatric: appropriate mood and affect. Good insight and appropriate judgment     Assessment and Plan   The following treatment plan was discussed     1. Seizure cerebral  Treat laceration with viscous lidocaine to soothe pain, she has tried a bite guard but now can't even tolerate it. She follows up with neurology next week for discussion of which alternative anti-epileptics would be reasonable options for her. She seems to be having significant memory issues; didn't recall discussed the CT with Dr. Gibbs 3/2019 and cannot recall what her discussion with rheumatology at [place] entailed.   - lidocaine viscous 2% (XYLOCAINE) 2 % Solution; Take 15 mL by mouth as needed for Throat/Mouth Pain.  Dispense: 120 mL; Refill: 0    2. Thrombocytopenia (HCC)  Following with Dr." Bernie, next follow up 9/2019.     Healthcare maintenance     Health Maintenance Due   Topic Date Due   • IMM HEP B VACCINE (1 of 3 - Risk 3-dose series) 07/08/1986   • IMM PNEUMOCOCCAL 19-64 (ADULT) MEDIUM RISK SERIES (1 of 1 - PPSV23) 07/08/1986   • COLONOSCOPY  07/08/2017   • MAMMOGRAM  03/12/2019     Return if symptoms worsen or fail to improve.    Shorty Ibarra MD  Internal Medicine  [facility]

## 2019-06-03 NOTE — ASSESSMENT & PLAN NOTE
She last saw her neurologist 5/3/2019 at which time she wasn't taking zonegran. Dr. Aguirre had wanted her to slowly resume the zonegran to 300 mg daily but she started having mood lability while still on the 200 mg dose. She was having depression/lethargy on the zonegran. They called Dr. Aguirre's office and were told to stop the zonegran. Her most recent seizure was 2 days ago with tongue biting.  reports that she was still having seizures even while on the 200 mg zonegran dose. She bit her tongue quite severely and hasn't been able to eat again.

## 2019-06-08 ENCOUNTER — OFFICE VISIT (OUTPATIENT)
Dept: URGENT CARE | Facility: PHYSICIAN GROUP | Age: 54
End: 2019-06-08
Payer: COMMERCIAL

## 2019-06-08 VITALS
BODY MASS INDEX: 24.48 KG/M2 | RESPIRATION RATE: 14 BRPM | TEMPERATURE: 98.6 F | HEART RATE: 65 BPM | OXYGEN SATURATION: 96 % | WEIGHT: 161 LBS | DIASTOLIC BLOOD PRESSURE: 68 MMHG | SYSTOLIC BLOOD PRESSURE: 124 MMHG

## 2019-06-08 DIAGNOSIS — K14.6 TONGUE PAIN: ICD-10-CM

## 2019-06-08 DIAGNOSIS — K14.0 TONGUE INFECTION: ICD-10-CM

## 2019-06-08 PROCEDURE — 99214 OFFICE O/P EST MOD 30 MIN: CPT | Performed by: FAMILY MEDICINE

## 2019-06-08 RX ORDER — AMOXICILLIN AND CLAVULANATE POTASSIUM 875; 125 MG/1; MG/1
TABLET, FILM COATED ORAL
Qty: 14 TAB | Refills: 0 | Status: SHIPPED | OUTPATIENT
Start: 2019-06-08 | End: 2019-09-13

## 2019-06-08 NOTE — PROGRESS NOTES
"Chief Complaint:    Chief Complaint   Patient presents with   • Oral Swelling     Patient \"had a seizure and bit her tongue\" she is requesting a refill of Lidocaine        History of Present Illness:    This is a new problem. She has a seizure 1 week ago which caused her to bite the right side of her tongue. Due to the pain in her tongue, she was rx'd Viscous Lidocaine on 6/3/19 which helps the tongue pain temporarily, but is out of med, and would like refill. However, she does not feel the tongue is improving and feels it may be getting worse, possibly infected. She has not had seizure since the recent one. She tolerated Augmentin in past.      Review of Systems:    Constitutional: Negative for fever, chills, and diaphoresis.   Eyes: Negative for change in vision, photophobia, pain, redness, and discharge.  ENT: See HPI.   Respiratory: Negative for cough, hemoptysis, sputum production, shortness of breath, wheezing, and stridor.    Cardiovascular: Negative for chest pain, palpitations, orthopnea, claudication, leg swelling, and PND.   Gastrointestinal: Negative for abdominal pain, nausea, vomiting, diarrhea, constipation, blood in stool, and melena.   Genitourinary: Negative for dysuria, urinary urgency, urinary frequency, hematuria, and flank pain.   Musculoskeletal: Negative for myalgias, joint pain, neck pain, and back pain.   Skin: Negative for rash and itching.   Neurological: See HPI.  Endo: Negative for polydipsia.   Heme: Does not bruise/bleed easily.   Psychiatric/Behavioral: Negative for depression, suicidal ideas, hallucinations, memory loss and substance abuse. The patient is not nervous/anxious and does not have insomnia.        Past Medical History:    Past Medical History:   Diagnosis Date   • Alcoholic hepatitis    • Sebaceous cyst 9/12/2018   • Seizure cerebral 6/22/2018   • Thrombocytopenia (HCC)      Past Surgical History:    Past Surgical History:   Procedure Laterality Date   • BONE MARROW " ASPIRATION Left 3/7/2019    Procedure: BONE MARROW ASPIRATION - ESTER;  Surgeon: Tiffanie Osullivan M.D.;  Location: ENDOSCOPY [facility];  Service: Orthopedics   • BONE MARROW BIOPSY, NDL/TROCAR Left 3/7/2019    Procedure: BONE MARROW BIOPSY, NDL/TROCAR;  Surgeon: Tiffanie Osullivan M.D.;  Location: ENDOSCOPY [facility];  Service: Orthopedics     Social History:    Social History     Social History   • Marital status:      Spouse name: N/A   • Number of children: N/A   • Years of education: N/A     Occupational History   • Not on file.     Social History Main Topics   • Smoking status: Current Every Day Smoker     Packs/day: 1.00     Years: 5.00   • Smokeless tobacco: Never Used      Comment: smoked in her teens    • Alcohol use No   • Drug use: No   • Sexual activity: Not Currently     Partners: Male     Other Topics Concern   • Not on file     Social History Narrative    Retail      Family History:    Family History   Problem Relation Age of Onset   • Hypertension Mother    • Alcohol/Drug Mother    • Breast Cancer Mother    • Hypertension Father    • Alcohol/Drug Father    • Autoimmune Disease Daughter         lupus   • Autoimmune Disease Son         colitis     Medications:    Current Outpatient Prescriptions on File Prior to Visit   Medication Sig Dispense Refill   • zonisamide (ZONEGRAN) 100 MG Cap Take 3 Caps by mouth every day. (Patient not taking: Reported on 6/8/2019) 90 Cap 3   • ibuprofen (MOTRIN) 200 MG Tab Take 200 mg by mouth every 6 hours as needed.       No current facility-administered medications on file prior to visit.      Allergies:    No Known Allergies      Vitals:    Vitals:    06/08/19 0954   BP: 124/68   Pulse: 65   Resp: 14   Temp: 37 °C (98.6 °F)   TempSrc: Temporal   SpO2: 96%   Weight: 73 kg (161 lb)       Physical Exam:    Constitutional: Vital signs reviewed. Appears well-developed and well-nourished. No acute distress.   Eyes: Sclera white, conjunctivae clear.   ENT:  External ears normal. Hearing normal. Tongue is swollen and tender on right side with evidence of bite marks from 1 week ago.  Neck: Neck supple.   Pulmonary/Chest: Respirations non-labored.   Lymph: Cervical nodes without tenderness or enlargement.  Musculoskeletal: Normal gait. Normal range of motion. No muscular atrophy or weakness.  Neurological: Alert and oriented to person, place, and time. Muscle tone normal. Coordination normal.   Skin: No rashes or lesions. Warm, dry, normal turgor.  Psychiatric: Normal mood and affect. Behavior is normal. Judgment and thought content normal.       Assessment / Plan:    1. Tongue pain  - lidocaine viscous 2% (XYLOCAINE) 2 % Solution; 15-20 ML SWISH AND SPIT EVERY 3 HOURS ONLY IF NEEDED FOR TONGUE PAIN.  Dispense: 100 mL; Refill: 2    2. Tongue infection  - amoxicillin-clavulanate (AUGMENTIN) 875-125 MG Tab; 1 TAB BY MOUTH TWICE A DAY X 7 DAYS. TAKE WITH FOOD.  Dispense: 14 Tab; Refill: 0      Discussed with her DDX, management options, and risks, benefits, and alternatives to treatment plan agreed upon.    Agreeable to medications prescribed.    Discussed expected course of duration, time for improvement, and to seek follow-up in Emergency Room, urgent care, or with PCP if getting worse at any time or not improving within expected time frame.

## 2019-06-10 ENCOUNTER — OFFICE VISIT (OUTPATIENT)
Dept: URGENT CARE | Facility: PHYSICIAN GROUP | Age: 54
End: 2019-06-10
Payer: COMMERCIAL

## 2019-06-10 VITALS
RESPIRATION RATE: 16 BRPM | SYSTOLIC BLOOD PRESSURE: 132 MMHG | OXYGEN SATURATION: 97 % | DIASTOLIC BLOOD PRESSURE: 80 MMHG | BODY MASS INDEX: 24.48 KG/M2 | WEIGHT: 161 LBS | HEART RATE: 67 BPM | TEMPERATURE: 98.2 F

## 2019-06-10 DIAGNOSIS — K14.6 TONGUE PAIN: ICD-10-CM

## 2019-06-10 PROCEDURE — 99214 OFFICE O/P EST MOD 30 MIN: CPT | Performed by: FAMILY MEDICINE

## 2019-06-10 RX ORDER — HYDROCODONE BITARTRATE AND ACETAMINOPHEN 5; 325 MG/1; MG/1
TABLET ORAL
Qty: 15 TAB | Refills: 0 | Status: SHIPPED | OUTPATIENT
Start: 2019-06-10 | End: 2019-06-12

## 2019-06-10 NOTE — PROGRESS NOTES
Chief Complaint:    Chief Complaint   Patient presents with   • Oral Swelling     Patient was seen in  06/08 patient stated she needs pain meds        History of Present Illness:    She was seen here on 6/8/19. She was rx'd Viscous Lidocaine and Augmentin 875 mg BID x 7 days due to right tongue pain due to bite from having a seizure 1 week prior. There was some confusion regarding the Viscous Lidocaine I prescribed that day so I called Sergio to inquire. Sergio told me she filled the original Rx for this on 6/3/19. Thus on 6/8/19, the insurance would not pay for it due to her fill on 6/3/19. However, today, the insurance will approve and they will also include 2 RFs on Rx that I put on 6/8/19. She still feels the tongue is getting worse.      Review of Systems:    Constitutional: Negative for fever, chills, and diaphoresis.   Eyes: Negative for change in vision, photophobia, pain, redness, and discharge.  ENT: See HPI.   Respiratory: Negative for cough, hemoptysis, sputum production, shortness of breath, wheezing, and stridor.    Cardiovascular: Negative for chest pain, palpitations, orthopnea, claudication, leg swelling, and PND.   Gastrointestinal: Negative for abdominal pain, nausea, vomiting, diarrhea, constipation, blood in stool, and melena.   Genitourinary: Negative for dysuria, urinary urgency, urinary frequency, hematuria, and flank pain.   Musculoskeletal: Negative for myalgias, joint pain, neck pain, and back pain.   Skin: Negative for rash and itching.   Neurological: See HPI.  Endo: Negative for polydipsia.   Heme: Does not bruise/bleed easily.   Psychiatric/Behavioral: Negative for depression, suicidal ideas, hallucinations, memory loss and substance abuse. The patient is not nervous/anxious and does not have insomnia.        Past Medical History:    Past Medical History:   Diagnosis Date   • Alcoholic hepatitis    • Sebaceous cyst 9/12/2018   • Seizure cerebral 6/22/2018   • Thrombocytopenia  (HCC)      Past Surgical History:    Past Surgical History:   Procedure Laterality Date   • BONE MARROW ASPIRATION Left 3/7/2019    Procedure: BONE MARROW ASPIRATION - ESTER;  Surgeon: Tiffanie Osullivan M.D.;  Location: ENDOSCOPY [facility];  Service: Orthopedics   • BONE MARROW BIOPSY, NDL/TROCAR Left 3/7/2019    Procedure: BONE MARROW BIOPSY, NDL/TROCAR;  Surgeon: Tiffanie Osullivan M.D.;  Location: ENDOSCOPY [facility];  Service: Orthopedics     Social History:    Social History     Social History   • Marital status:      Spouse name: N/A   • Number of children: N/A   • Years of education: N/A     Occupational History   • Not on file.     Social History Main Topics   • Smoking status: Current Every Day Smoker     Packs/day: 1.00     Years: 5.00   • Smokeless tobacco: Never Used      Comment: smoked in her teens    • Alcohol use No   • Drug use: No   • Sexual activity: Not Currently     Partners: Male     Other Topics Concern   • Not on file     Social History Narrative    Retail      Family History:    Family History   Problem Relation Age of Onset   • Hypertension Mother    • Alcohol/Drug Mother    • Breast Cancer Mother    • Hypertension Father    • Alcohol/Drug Father    • Autoimmune Disease Daughter         lupus   • Autoimmune Disease Son         colitis     Medications:    Current Outpatient Prescriptions on File Prior to Visit   Medication Sig Dispense Refill   • amoxicillin-clavulanate (AUGMENTIN) 875-125 MG Tab 1 TAB BY MOUTH TWICE A DAY X 7 DAYS. TAKE WITH FOOD. 14 Tab 0   • zonisamide (ZONEGRAN) 100 MG Cap Take 3 Caps by mouth every day. 90 Cap 3   • ibuprofen (MOTRIN) 200 MG Tab Take 200 mg by mouth every 6 hours as needed.     • lidocaine viscous 2% (XYLOCAINE) 2 % Solution 15-20 ML SWISH AND SPIT EVERY 3 HOURS ONLY IF NEEDED FOR TONGUE PAIN. (Patient not taking: Reported on 6/10/2019) 100 mL 2     No current facility-administered medications on file prior to visit.      Allergies:    No  Known Allergies      Vitals:    Vitals:    06/10/19 0924   BP: 132/80   Pulse: 67   Resp: 16   Temp: 36.8 °C (98.2 °F)   TempSrc: Temporal   SpO2: 97%   Weight: 73 kg (161 lb)       Physical Exam:    Constitutional: Vital signs reviewed. Appears well-developed and well-nourished. Appears to be in pain from her tongue.  Eyes: Sclera white, conjunctivae clear.   ENT: External ears normal. Hearing normal. Tongue is mildly swollen and moderately-severely tender on right side with evidence of bite marks from over 1 week ago.  Neck: Neck supple.   Pulmonary/Chest: Respirations non-labored.   Lymph: Cervical nodes without tenderness or enlargement.  Musculoskeletal: Normal gait. Normal range of motion. No muscular atrophy or weakness.  Neurological: Alert and oriented to person, place, and time. Muscle tone normal. Coordination normal.   Skin: No rashes or lesions. Warm, dry, normal turgor.  Psychiatric: Normal mood and affect. Behavior is normal. Judgment and thought content normal.       Assessment / Plan:    1. Tongue pain  - HYDROcodone-acetaminophen (NORCO) 5-325 MG Tab per tablet; 1 TAB EVERY 6 HOURS ONLY IF NEEDED FOR PAIN FOR UP TO 4 DAYS. MAY CAUSE DROWSINESS.  Dispense: 15 Tab; Refill: 0  - Consent for Opiate Prescription  - REFERRAL TO ENT      Discussed with her DDX, management options, and risks, benefits, and alternatives to treatment plan agreed upon.    Discussed with her situation regarding Viscous Lidocaine as noted in HPI. She will get this from [facility] today.    Agreeable to medication prescribed.  report checked - last Rx was Alprazolam 0.5 mg #30 on 11/21/18. No narcotic Rx x 3 years.    In my professional judgment, after considering each of the factors set forth in , the CS is medically necessary and appropriate.    I also ordered a referral to ENT for further evaluation and management as I already have her on antibiotic and pain medication but she feels she is worsening.    Advised she  should go to Emergency Room for evaluation if getting worse while waiting to see ENT.

## 2019-06-12 ENCOUNTER — OFFICE VISIT (OUTPATIENT)
Dept: NEUROLOGY | Facility: MEDICAL CENTER | Age: 54
End: 2019-06-12
Payer: COMMERCIAL

## 2019-06-12 ENCOUNTER — OFFICE VISIT (OUTPATIENT)
Dept: URGENT CARE | Facility: PHYSICIAN GROUP | Age: 54
End: 2019-06-12
Payer: COMMERCIAL

## 2019-06-12 VITALS
BODY MASS INDEX: 23.95 KG/M2 | OXYGEN SATURATION: 94 % | SYSTOLIC BLOOD PRESSURE: 124 MMHG | TEMPERATURE: 96.6 F | HEART RATE: 92 BPM | RESPIRATION RATE: 16 BRPM | WEIGHT: 158 LBS | DIASTOLIC BLOOD PRESSURE: 78 MMHG | HEIGHT: 68 IN

## 2019-06-12 VITALS
BODY MASS INDEX: 24.18 KG/M2 | DIASTOLIC BLOOD PRESSURE: 90 MMHG | RESPIRATION RATE: 16 BRPM | SYSTOLIC BLOOD PRESSURE: 142 MMHG | TEMPERATURE: 97.3 F | WEIGHT: 159 LBS | HEART RATE: 103 BPM | OXYGEN SATURATION: 95 %

## 2019-06-12 DIAGNOSIS — Z13.31 SCREENING FOR DEPRESSION: ICD-10-CM

## 2019-06-12 DIAGNOSIS — K12.0 APHTHOUS ULCER OF TONGUE: ICD-10-CM

## 2019-06-12 DIAGNOSIS — R56.9 WITNESSED SEIZURE-LIKE ACTIVITY (HCC): ICD-10-CM

## 2019-06-12 DIAGNOSIS — F10.129 ALCOHOL ABUSE WITH INTOXICATION (HCC): ICD-10-CM

## 2019-06-12 DIAGNOSIS — F39 MOOD DISORDER (HCC): ICD-10-CM

## 2019-06-12 DIAGNOSIS — Z02.4 ENCOUNTER FOR EXAMINATION FOR DRIVING LICENSE: ICD-10-CM

## 2019-06-12 PROCEDURE — 99213 OFFICE O/P EST LOW 20 MIN: CPT | Performed by: PHYSICIAN ASSISTANT

## 2019-06-12 PROCEDURE — 99215 OFFICE O/P EST HI 40 MIN: CPT | Performed by: NURSE PRACTITIONER

## 2019-06-12 RX ORDER — LORAZEPAM 0.5 MG/1
0.5 TABLET ORAL ONCE
Qty: 1 TAB | Refills: 0 | Status: SHIPPED | OUTPATIENT
Start: 2019-06-12 | End: 2019-06-12

## 2019-06-12 RX ORDER — ZONISAMIDE 100 MG/1
100 CAPSULE ORAL
Qty: 30 CAP | Refills: 5 | Status: SHIPPED | OUTPATIENT
Start: 2019-06-12 | End: 2019-09-13 | Stop reason: SDUPTHER

## 2019-06-12 NOTE — PROGRESS NOTES
Chief Complaint   Patient presents with   • Oral Swelling     Tongue pain   • Medication Refill     antibiotics and lido        HISTORY OF PRESENT ILLNESS: Patient is a 53 y.o. female who presents today for the following:    Patient comes in for reevaluation of tongue pain and swelling.  She was seen 6/8 and 6/10 for the same.  She has been on Augmentin and lidocaine without any significant change in swelling.  The lidocaine does help temporarily.  She is also been switching her mouth with a mixture of hydrogen peroxide and salt water.  Patient denies any drainage, fever, body aches, chills.    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    Current Outpatient Prescriptions Ordered in [facility]   Medication Sig Dispense Refill   • HYDROcodone-acetaminophen (NORCO) 5-325 MG Tab per tablet 1 TAB EVERY 6 HOURS ONLY IF NEEDED FOR PAIN FOR UP TO 4 DAYS. MAY CAUSE DROWSINESS. 15 Tab 0   • lidocaine viscous 2% (XYLOCAINE) 2 % Solution 15-20 ML SWISH AND SPIT EVERY 3 HOURS ONLY IF NEEDED FOR TONGUE PAIN. 100 mL 2   • zonisamide (ZONEGRAN) 100 MG Cap Take 3 Caps by mouth every day. 90 Cap 3   • ibuprofen (MOTRIN) 200 MG Tab Take 200 mg by mouth every 6 hours as needed.     • amoxicillin-clavulanate (AUGMENTIN) 875-125 MG Tab 1 TAB BY MOUTH TWICE A DAY X 7 DAYS. TAKE WITH FOOD. (Patient not taking: Reported on 6/12/2019) 14 Tab 0     No current Epic-ordered facility-administered medications on file.        Past Medical  History:   Diagnosis Date   • Alcoholic hepatitis    • Sebaceous cyst 2018   • Seizure cerebral 2018   • Thrombocytopenia (HCC)        Social History   Substance Use Topics   • Smoking status: Current Every Day Smoker     Packs/day: 1.00     Years: 5.00   • Smokeless tobacco: Never Used      Comment: smoked in her teens    • Alcohol use No       Family Status   Relation Status   • Mo Alive   • Fa    • Gisela Alive   • Son Alive     Family History   Problem Relation Age of Onset   • Hypertension Mother    • Alcohol/Drug Mother    • Breast Cancer Mother    • Hypertension Father    • Alcohol/Drug Father    • Autoimmune Disease Daughter         lupus   • Autoimmune Disease Son         colitis       Review of Systems:    Constitutional ROS: No unexpected change in weight, No weakness, No fatigue  Eye ROS: No recent significant change in vision, No eye pain, redness, discharge  Ear ROS: No drainage, No tinnitus or vertigo, No recent change in hearing  Mouth/Throat ROS: No teeth or gum problems, No bleeding gums, No tongue complaints  Neck ROS: No swollen glands, No significant pain in neck  Pulmonary ROS: No chronic cough, sputum, or hemoptysis, No dyspnea on exertion, No wheezing  Cardiovascular ROS: No diaphoresis, No edema, No palpitations  Gastrointestinal ROS: No change in bowel habits, No significant change in appetite, No nausea, vomiting, diarrhea, or constipation  Musculoskeletal/Extremities ROS: No peripheral edema, No pain, redness or swelling on the joints  Hematologic/Lymphatic ROS: No chills, No night sweats, No weight loss  Skin/Integumentary ROS: No edema, No evidence of rash, No itching      Exam:  /90   Pulse (!) 103   Temp 36.3 °C (97.3 °F) (Temporal)   Resp 16   Wt 72.1 kg (159 lb)   SpO2 95%   General: Well developed, well nourished. No distress.  HEENT: Head is grossly normal.  Large sore on the right lateral aspect of the tongue, consistent with recent trauma.  Generalized  mild erythema and edema is noted on the lateral aspect as well.  No drainage is noted.  Pulmonary: Unlabored respiratory effort.   Neurologic: Grossly nonfocal. No facial asymmetry noted.  Skin: Warm, dry, good turgor. No rashes in visible areas.   Psych: Normal mood. Alert and oriented x3. Judgment and insight is normal.    Assessment/Plan:  No improvement with antibiotics.  Do not feel more antibiotics are warranted at this time.  Stop using hydrogen peroxide in the mouth.  Continue lidocaine gel as needed for pain.  Reassured patient that this likely just needs more time.  Follow-up for worsening or persistent symptoms.  1. Aphthous ulcer of tongue
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
## 2019-07-02 ENCOUNTER — OFFICE VISIT (OUTPATIENT)
Dept: URGENT CARE | Facility: PHYSICIAN GROUP | Age: 54
End: 2019-07-02
Payer: COMMERCIAL

## 2019-07-02 ENCOUNTER — HOSPITAL ENCOUNTER (OUTPATIENT)
Facility: MEDICAL CENTER | Age: 54
End: 2019-07-02
Attending: FAMILY MEDICINE
Payer: COMMERCIAL

## 2019-07-02 VITALS
HEIGHT: 68 IN | RESPIRATION RATE: 16 BRPM | TEMPERATURE: 98.3 F | DIASTOLIC BLOOD PRESSURE: 80 MMHG | BODY MASS INDEX: 23.64 KG/M2 | OXYGEN SATURATION: 98 % | WEIGHT: 156 LBS | SYSTOLIC BLOOD PRESSURE: 102 MMHG | HEART RATE: 96 BPM

## 2019-07-02 DIAGNOSIS — R11.2 NAUSEA AND VOMITING, INTRACTABILITY OF VOMITING NOT SPECIFIED, UNSPECIFIED VOMITING TYPE: ICD-10-CM

## 2019-07-02 DIAGNOSIS — F41.9 ANXIETY: ICD-10-CM

## 2019-07-02 DIAGNOSIS — R82.90 ABNORMAL URINALYSIS: ICD-10-CM

## 2019-07-02 DIAGNOSIS — R19.7 DIARRHEA, UNSPECIFIED TYPE: ICD-10-CM

## 2019-07-02 LAB
APPEARANCE UR: ABNORMAL
BILIRUB UR STRIP-MCNC: ABNORMAL MG/DL
COLOR UR AUTO: ABNORMAL
GLUCOSE UR STRIP.AUTO-MCNC: 100 MG/DL
KETONES UR STRIP.AUTO-MCNC: 15 MG/DL
LEUKOCYTE ESTERASE UR QL STRIP.AUTO: ABNORMAL
NITRITE UR QL STRIP.AUTO: ABNORMAL
PH UR STRIP.AUTO: 9 [PH] (ref 5–8)
PROT UR QL STRIP: 100 MG/DL
RBC UR QL AUTO: ABNORMAL
SP GR UR STRIP.AUTO: 1.01
UROBILINOGEN UR STRIP-MCNC: 8 MG/DL

## 2019-07-02 PROCEDURE — 87086 URINE CULTURE/COLONY COUNT: CPT

## 2019-07-02 PROCEDURE — 81002 URINALYSIS NONAUTO W/O SCOPE: CPT | Performed by: FAMILY MEDICINE

## 2019-07-02 PROCEDURE — 99214 OFFICE O/P EST MOD 30 MIN: CPT | Performed by: FAMILY MEDICINE

## 2019-07-02 RX ORDER — ONDANSETRON 4 MG/1
4 TABLET, ORALLY DISINTEGRATING ORAL EVERY 8 HOURS PRN
Qty: 10 TAB | Refills: 1 | Status: SHIPPED | OUTPATIENT
Start: 2019-07-02 | End: 2020-03-22

## 2019-07-02 RX ORDER — LORAZEPAM 1 MG/1
1 TABLET ORAL EVERY 8 HOURS PRN
Qty: 10 TAB | Refills: 0 | Status: SHIPPED | OUTPATIENT
Start: 2019-07-02 | End: 2019-07-07

## 2019-07-02 RX ORDER — ACYCLOVIR 400 MG/1
400 TABLET ORAL
COMMUNITY
Start: 2018-08-20 | End: 2020-03-22

## 2019-07-02 RX ORDER — IBUPROFEN 200 MG
200 TABLET ORAL
COMMUNITY
End: 2020-03-22

## 2019-07-02 RX ORDER — ONDANSETRON 4 MG/1
4 TABLET, ORALLY DISINTEGRATING ORAL ONCE
Status: COMPLETED | OUTPATIENT
Start: 2019-07-02 | End: 2019-07-02

## 2019-07-02 RX ORDER — HYDROXYCHLOROQUINE SULFATE 200 MG/1
TABLET, FILM COATED ORAL
COMMUNITY
Start: 2018-05-11 | End: 2020-03-22

## 2019-07-02 RX ORDER — LORAZEPAM 0.5 MG/1
TABLET ORAL
Refills: 0 | COMMUNITY
Start: 2019-06-13 | End: 2019-09-13

## 2019-07-02 RX ORDER — ZONISAMIDE 100 MG/1
300 CAPSULE ORAL
COMMUNITY
Start: 2018-11-21 | End: 2019-09-13

## 2019-07-02 RX ADMIN — ONDANSETRON 4 MG: 4 TABLET, ORALLY DISINTEGRATING ORAL at 17:02

## 2019-07-02 ASSESSMENT — ENCOUNTER SYMPTOMS
WEIGHT LOSS: 0
NERVOUS/ANXIOUS: 1
FLANK PAIN: 0
BLOOD IN STOOL: 0

## 2019-07-02 NOTE — PROGRESS NOTES
"Subjective:      Zari Day is a 53 y.o. female who presents with Nausea (x 5 days )            4 days mostly N/V. She has had a few small specs of blood in the emesis. Loose stool thinks from trying to push fluids with juices. No fever. +chills. Intermittent abdominal pain. No recent travel/abx/camping or drinking from streams. Continues to urinate but appears dark. She has had episodes similar to this in the past and has referral to gastroenterologist. No other aggravating or alleviating factors.          Review of Systems   Constitutional: Negative for malaise/fatigue and weight loss.   Eyes:        Chronic jaundice   Gastrointestinal: Negative for blood in stool.   Genitourinary: Negative for dysuria, flank pain, frequency, hematuria and urgency.   Skin: Negative for itching and rash.        Chronic jaundice   Psychiatric/Behavioral: The patient is nervous/anxious (Multiple medical problems are contributing).      .  Medications, Allergies, and current problem list reviewed today in Epic       Objective:     /80   Pulse 96   Temp 35.9 °C (96.6 °F)   Resp 16   Ht 1.727 m (5' 8\")   Wt 70.8 kg (156 lb)   LMP 05/05/2017   SpO2 98%   BMI 23.72 kg/m²    Recheck temp 98.3F    Physical Exam   Constitutional: She is oriented to person, place, and time. She appears well-developed and well-nourished. No distress.   HENT:   Head: Normocephalic and atraumatic.   Mouth/Throat: Oropharynx is clear and moist.   Eyes: Pupils are equal, round, and reactive to light. EOM are normal.   Chronic jaundice   Cardiovascular: Normal rate, regular rhythm and normal heart sounds.    No murmur heard.  Pulmonary/Chest: Effort normal and breath sounds normal.   Abdominal: Soft. Bowel sounds are normal. There is no tenderness.   Neurological: She is alert and oriented to person, place, and time.   Skin: Skin is warm and dry.   Psychiatric:   Appears anxious               Assessment/Plan:   UA reviewed    1. Nausea and " vomiting, intractability of vomiting not specified, unspecified vomiting type  ondansetron (ZOFRAN ODT) dispertab 4 mg    POCT Urinalysis    ondansetron (ZOFRAN ODT) 4 MG TABLET DISPERSIBLE   2. Diarrhea, unspecified type  POCT Urinalysis   3. Anxiety  LORazepam (ATIVAN) 1 MG Tab   4. Abnormal urinalysis  URINE CULTURE(NEW)     Differential diagnosis, natural history, supportive care, and indications for immediate follow-up discussed at length.     Zofran in clinic is helpful.  This appears to be a chronic/recurrent problem for her.  She will follow-up with gastroenterology.

## 2019-07-05 LAB
BACTERIA UR CULT: NORMAL
SIGNIFICANT IND 70042: NORMAL
SITE SITE: NORMAL
SOURCE SOURCE: NORMAL

## 2019-08-01 ENCOUNTER — HOSPITAL ENCOUNTER (OUTPATIENT)
Dept: RADIOLOGY | Facility: MEDICAL CENTER | Age: 54
End: 2019-08-01
Attending: NURSE PRACTITIONER
Payer: COMMERCIAL

## 2019-08-01 DIAGNOSIS — R56.9 WITNESSED SEIZURE-LIKE ACTIVITY (HCC): ICD-10-CM

## 2019-08-01 PROCEDURE — 70553 MRI BRAIN STEM W/O & W/DYE: CPT

## 2019-08-01 PROCEDURE — 700117 HCHG RX CONTRAST REV CODE 255: Performed by: NURSE PRACTITIONER

## 2019-08-01 RX ADMIN — GADOTERIDOL 10 ML: 279.3 INJECTION, SOLUTION INTRAVENOUS at 10:37

## 2019-09-09 ENCOUNTER — TELEPHONE (OUTPATIENT)
Dept: NEUROLOGY | Facility: MEDICAL CENTER | Age: 54
End: 2019-09-09
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
## 2019-09-13 ENCOUNTER — OFFICE VISIT (OUTPATIENT)
Dept: NEUROLOGY | Facility: MEDICAL CENTER | Age: 54
End: 2019-09-13
Payer: COMMERCIAL

## 2019-09-13 VITALS
TEMPERATURE: 97.1 F | SYSTOLIC BLOOD PRESSURE: 118 MMHG | HEIGHT: 68 IN | WEIGHT: 150 LBS | BODY MASS INDEX: 22.73 KG/M2 | DIASTOLIC BLOOD PRESSURE: 72 MMHG | RESPIRATION RATE: 14 BRPM | HEART RATE: 98 BPM | OXYGEN SATURATION: 96 %

## 2019-09-13 DIAGNOSIS — F10.11 HISTORY OF ALCOHOL ABUSE: ICD-10-CM

## 2019-09-13 DIAGNOSIS — Z02.4 ENCOUNTER FOR EXAMINATION FOR DRIVING LICENSE: ICD-10-CM

## 2019-09-13 DIAGNOSIS — E55.9 VITAMIN D DEFICIENCY: ICD-10-CM

## 2019-09-13 DIAGNOSIS — F39 MOOD DISORDER (HCC): ICD-10-CM

## 2019-09-13 DIAGNOSIS — R56.9 WITNESSED SEIZURE-LIKE ACTIVITY (HCC): ICD-10-CM

## 2019-09-13 DIAGNOSIS — Z13.31 SCREENING FOR DEPRESSION: ICD-10-CM

## 2019-09-13 PROCEDURE — 99215 OFFICE O/P EST HI 40 MIN: CPT | Performed by: NURSE PRACTITIONER

## 2019-09-13 RX ORDER — ZONISAMIDE 100 MG/1
100 CAPSULE ORAL
Qty: 30 CAP | Refills: 11 | Status: ON HOLD | OUTPATIENT
Start: 2019-09-13 | End: 2020-03-23
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
## 2019-09-17 ENCOUNTER — TELEPHONE (OUTPATIENT)
Dept: HEMATOLOGY ONCOLOGY | Facility: MEDICAL CENTER | Age: 54
End: 2019-09-17

## 2019-09-18 ENCOUNTER — TELEPHONE (OUTPATIENT)
Dept: HEMATOLOGY ONCOLOGY | Facility: MEDICAL CENTER | Age: 54
End: 2019-09-18

## 2019-09-18 NOTE — TELEPHONE ENCOUNTER
1st attempt    Called patient and left message to inform her of her missed appointment on 9/18/19 with Dr. Corrales.

## 2019-09-19 ENCOUNTER — TELEPHONE (OUTPATIENT)
Dept: HEMATOLOGY ONCOLOGY | Facility: MEDICAL CENTER | Age: 54
End: 2019-09-19

## 2019-09-19 NOTE — TELEPHONE ENCOUNTER
Patient called to reschedule her appointment due to illness not realizing that her appointment was actually yesterday. Patient will call back to reschedule.

## 2019-09-20 NOTE — TELEPHONE ENCOUNTER
Called pt again who stated that her vomiting has finally resolved.  Pt stated that she has been referred to a GI doctor, as the vomiting has been an ongoing problem, but she has so many appointments with specialists, that she has not yet scheduled that appointment.  Pt explained that when she has had these episodes before, she takes a tablet that dissolves on her tongue, but yesterday it was not helpful.  She stated that she took some pedialyte and is feeling much better today.  Offered to transfer to  to reschedule appointment with Dr. Corrales, but she stated she did not have time right now.  She asked if someone could call her this pm.  RN notified .

## 2019-09-27 ENCOUNTER — TELEPHONE (OUTPATIENT)
Dept: HEMATOLOGY ONCOLOGY | Facility: MEDICAL CENTER | Age: 54
End: 2019-09-27

## 2019-10-04 ENCOUNTER — OFFICE VISIT (OUTPATIENT)
Dept: URGENT CARE | Facility: PHYSICIAN GROUP | Age: 54
End: 2019-10-04
Payer: COMMERCIAL

## 2019-10-04 VITALS
RESPIRATION RATE: 16 BRPM | DIASTOLIC BLOOD PRESSURE: 74 MMHG | OXYGEN SATURATION: 98 % | HEIGHT: 68 IN | BODY MASS INDEX: 23.79 KG/M2 | TEMPERATURE: 98.1 F | HEART RATE: 89 BPM | WEIGHT: 157 LBS | SYSTOLIC BLOOD PRESSURE: 122 MMHG

## 2019-10-04 DIAGNOSIS — M79.671 RIGHT FOOT PAIN: ICD-10-CM

## 2019-10-04 PROCEDURE — 99214 OFFICE O/P EST MOD 30 MIN: CPT | Performed by: INTERNAL MEDICINE

## 2019-10-04 RX ORDER — PREDNISONE 20 MG/1
20 TABLET ORAL DAILY
Qty: 7 TAB | Refills: 0 | Status: SHIPPED | OUTPATIENT
Start: 2019-10-04 | End: 2019-10-11

## 2019-10-04 ASSESSMENT — ENCOUNTER SYMPTOMS
FEVER: 0
ARTHRALGIAS: 1
MYALGIAS: 0

## 2019-11-22 ENCOUNTER — HOSPITAL ENCOUNTER (OUTPATIENT)
Dept: LAB | Facility: MEDICAL CENTER | Age: 54
End: 2019-11-22
Attending: NURSE PRACTITIONER
Payer: COMMERCIAL

## 2019-11-22 DIAGNOSIS — E55.9 VITAMIN D DEFICIENCY: ICD-10-CM

## 2019-11-22 DIAGNOSIS — R56.9 WITNESSED SEIZURE-LIKE ACTIVITY (HCC): ICD-10-CM

## 2019-11-22 LAB
25(OH)D3 SERPL-MCNC: 35 NG/ML (ref 30–100)
ALBUMIN SERPL BCP-MCNC: 4.8 G/DL (ref 3.2–4.9)
ALBUMIN/GLOB SERPL: 1.8 G/DL
ALP SERPL-CCNC: 133 U/L (ref 30–99)
ALT SERPL-CCNC: 72 U/L (ref 2–50)
ANION GAP SERPL CALC-SCNC: 13 MMOL/L (ref 0–11.9)
AST SERPL-CCNC: 140 U/L (ref 12–45)
BILIRUB SERPL-MCNC: 2.8 MG/DL (ref 0.1–1.5)
BUN SERPL-MCNC: 10 MG/DL (ref 8–22)
CALCIUM SERPL-MCNC: 9.7 MG/DL (ref 8.5–10.5)
CHLORIDE SERPL-SCNC: 105 MMOL/L (ref 96–112)
CO2 SERPL-SCNC: 26 MMOL/L (ref 20–33)
CREAT SERPL-MCNC: 0.65 MG/DL (ref 0.5–1.4)
FASTING STATUS PATIENT QL REPORTED: NORMAL
GLOBULIN SER CALC-MCNC: 2.7 G/DL (ref 1.9–3.5)
GLUCOSE SERPL-MCNC: 85 MG/DL (ref 65–99)
POTASSIUM SERPL-SCNC: 3.8 MMOL/L (ref 3.6–5.5)
PROT SERPL-MCNC: 7.5 G/DL (ref 6–8.2)
SODIUM SERPL-SCNC: 144 MMOL/L (ref 135–145)

## 2019-11-22 PROCEDURE — 82306 VITAMIN D 25 HYDROXY: CPT

## 2019-11-22 PROCEDURE — 36415 COLL VENOUS BLD VENIPUNCTURE: CPT

## 2019-11-22 PROCEDURE — 80053 COMPREHEN METABOLIC PANEL: CPT

## 2019-12-09 ENCOUNTER — APPOINTMENT (OUTPATIENT)
Dept: NEUROLOGY | Facility: MEDICAL CENTER | Age: 54
End: 2019-12-09

## 2019-12-11 DIAGNOSIS — R56.9 WITNESSED SEIZURE-LIKE ACTIVITY (HCC): ICD-10-CM

## 2019-12-11 DIAGNOSIS — F41.9 ANXIETY: ICD-10-CM

## 2019-12-12 RX ORDER — CITALOPRAM 20 MG/1
TABLET ORAL
Qty: 60 TAB | Refills: 0 | OUTPATIENT
Start: 2019-12-12

## 2019-12-12 RX ORDER — ZONISAMIDE 100 MG/1
CAPSULE ORAL
Qty: 270 CAP | OUTPATIENT
Start: 2019-12-12

## 2019-12-12 NOTE — TELEPHONE ENCOUNTER
Pt was taken off of Celexa 12/18 due to side effects and Zonegran is now being monitored by neurology and there should be plenty of fills.

## 2020-01-28 ENCOUNTER — NON-PROVIDER VISIT (OUTPATIENT)
Dept: NEUROLOGY | Facility: MEDICAL CENTER | Age: 55
End: 2020-01-28
Payer: COMMERCIAL

## 2020-01-28 DIAGNOSIS — R56.9 WITNESSED SEIZURE-LIKE ACTIVITY (HCC): ICD-10-CM

## 2020-01-28 PROCEDURE — 95719 EEG PHYS/QHP EA INCR W/O VID: CPT | Performed by: PSYCHIATRY & NEUROLOGY

## 2020-01-29 ENCOUNTER — NON-PROVIDER VISIT (OUTPATIENT)
Dept: NEUROLOGY | Facility: MEDICAL CENTER | Age: 55
End: 2020-01-29
Payer: COMMERCIAL

## 2020-01-30 NOTE — PROCEDURES
24 HR AMBULATORY ELECTROENCEPHALOGRAM REPORT      Referring provider: ROOPA Grullon.    DOS:   1/28/2020 (recording for 23 hours and 1 minute).     INDICATION:  Zari Day 54 y.o. female presenting with seizure.     CURRENT ANTIEPILEPTIC REGIMEN: zonidamide.      TECHNIQUE: A 30-channel, 24 hrs ambulatory electroencephalogram (aEEG) was performed in accordance with the international 10-20 system. This digital study was reviewed in bipolar and referential montages. The recording examined the patient during wakeful, drowsy and sleep states.     DESCRIPTION OF THE RECORD:  During the awake state, background shows symmetrical 10 Hz alpha activity posteriorly with amplitude of 70 mV.  There was reactivity with eye opening/closure.  Normal anterior-posterior gradient was noted with faster beta frequencies seen anteriorly.  During drowsiness, high-amplitude delta frequencies were seen.    During the sleep state, background shows diffuse high-amplitude 4-5 Hz delta activity.  Symmetrical high-amplitude sleep spindles and vertex sharp activities were seen in the central leads.    ACTIVATION PROCEDURES:   Hyperventilation was performed by the patient for a total of 3 minutes. The technician performing the test noted good effort. This technique produced electroencephalographic changes in keeping with the expected bilaterally synchronous, frontally predominant, high amplitude slow waves build up.     Intermittent Photic stimulation was performed in a stepwise fashion from 1 to 30 Hz and elicited a normal response (photic driving), most noticeable in the posterior leads.      ICTAL AND/OR INTERICTAL FINDINGS:   No focal or generalized epileptiform activity was noted. No regional slowing was seen during this study.  No seizures were recorded during the study.     EVENT(S): numbness on the right fingers, feelings of confusion/short term memory loss, hunger. These symptoms were not epileptic in nature.     EKG: sampling  review of EKG recording demonstrated sinus rhythm.       INTERPRETATION:   This is a normal 24 hours ambulatory electroencephalogram recording in the awake, drowsy and sleep state.  The patient reported symptoms related to numbness on the right fingers, feelings of confusion/short term memory loss, hunger. These symptoms were not epileptic in nature. No seizures were captured during the study. Clinical correlation is recommended.    Note: A normal electroencephalogram does not rule out epilepsy.         Edin Lowry MD   Epilepsy and Neurodiagnostics.   Clinical  of Neurology [facility].   Diplomate in Neurology, Epilepsy, and Electrodiagnostic Medicine.   Office: [phone]  Fax: [phone]

## 2020-03-22 ENCOUNTER — HOSPITAL ENCOUNTER (INPATIENT)
Facility: MEDICAL CENTER | Age: 55
LOS: 1 days | DRG: 641 | End: 2020-03-23
Attending: EMERGENCY MEDICINE | Admitting: INTERNAL MEDICINE
Payer: COMMERCIAL

## 2020-03-22 ENCOUNTER — APPOINTMENT (OUTPATIENT)
Dept: RADIOLOGY | Facility: MEDICAL CENTER | Age: 55
DRG: 641 | End: 2020-03-22
Attending: EMERGENCY MEDICINE
Payer: COMMERCIAL

## 2020-03-22 DIAGNOSIS — F10.929 ALCOHOLIC INTOXICATION WITH COMPLICATION (HCC): ICD-10-CM

## 2020-03-22 DIAGNOSIS — R56.9 SEIZURE (HCC): ICD-10-CM

## 2020-03-22 DIAGNOSIS — E87.6 HYPOKALEMIA: ICD-10-CM

## 2020-03-22 DIAGNOSIS — E87.20 LACTIC ACIDOSIS: ICD-10-CM

## 2020-03-22 DIAGNOSIS — F10.10 ALCOHOL ABUSE: ICD-10-CM

## 2020-03-22 PROBLEM — A41.9 SEPSIS (HCC): Status: ACTIVE | Noted: 2020-03-22

## 2020-03-22 PROBLEM — R79.89 LACTATE BLOOD INCREASE: Status: ACTIVE | Noted: 2020-03-22

## 2020-03-22 LAB
ALBUMIN SERPL BCP-MCNC: 4.2 G/DL (ref 3.2–4.9)
ALBUMIN/GLOB SERPL: 1.8 G/DL
ALP SERPL-CCNC: 134 U/L (ref 30–99)
ALT SERPL-CCNC: 41 U/L (ref 2–50)
AMMONIA PLAS-SCNC: 36 UMOL/L (ref 11–45)
AMPHET UR QL SCN: NEGATIVE
ANION GAP SERPL CALC-SCNC: 20 MMOL/L (ref 7–16)
APPEARANCE UR: CLEAR
APTT PPP: 33.5 SEC (ref 24.7–36)
AST SERPL-CCNC: 104 U/L (ref 12–45)
BACTERIA #/AREA URNS HPF: NEGATIVE /HPF
BARBITURATES UR QL SCN: NEGATIVE
BASOPHILS # BLD AUTO: 4.7 % (ref 0–1.8)
BASOPHILS # BLD: 0.09 K/UL (ref 0–0.12)
BENZODIAZ UR QL SCN: NEGATIVE
BILIRUB SERPL-MCNC: 2.3 MG/DL (ref 0.1–1.5)
BILIRUB UR QL STRIP.AUTO: NEGATIVE
BUN SERPL-MCNC: 8 MG/DL (ref 8–22)
BZE UR QL SCN: NEGATIVE
CALCIUM SERPL-MCNC: 9.2 MG/DL (ref 8.5–10.5)
CANNABINOIDS UR QL SCN: POSITIVE
CHLORIDE SERPL-SCNC: 109 MMOL/L (ref 96–112)
CK SERPL-CCNC: 147 U/L (ref 0–154)
CO2 SERPL-SCNC: 20 MMOL/L (ref 20–33)
COLOR UR: ABNORMAL
CREAT SERPL-MCNC: 0.78 MG/DL (ref 0.5–1.4)
EKG IMPRESSION: NORMAL
EOSINOPHIL # BLD AUTO: 0.05 K/UL (ref 0–0.51)
EOSINOPHIL NFR BLD: 2.6 % (ref 0–6.9)
EPI CELLS #/AREA URNS HPF: NEGATIVE /HPF
ERYTHROCYTE [DISTWIDTH] IN BLOOD BY AUTOMATED COUNT: 53 FL (ref 35.9–50)
ETHANOL BLD-MCNC: 357.4 MG/DL (ref 0–10.1)
GLOBULIN SER CALC-MCNC: 2.4 G/DL (ref 1.9–3.5)
GLUCOSE SERPL-MCNC: 86 MG/DL (ref 65–99)
GLUCOSE UR STRIP.AUTO-MCNC: NEGATIVE MG/DL
HCT VFR BLD AUTO: 40.3 % (ref 37–47)
HGB BLD-MCNC: 13.5 G/DL (ref 12–16)
HYALINE CASTS #/AREA URNS LPF: ABNORMAL /LPF
IMM GRANULOCYTES # BLD AUTO: 0.02 K/UL (ref 0–0.11)
IMM GRANULOCYTES NFR BLD AUTO: 1 % (ref 0–0.9)
INR PPP: 1.19 (ref 0.87–1.13)
KETONES UR STRIP.AUTO-MCNC: NEGATIVE MG/DL
LACTATE BLD-SCNC: 5.4 MMOL/L (ref 0.5–2)
LACTATE BLD-SCNC: 5.7 MMOL/L (ref 0.5–2)
LACTATE BLD-SCNC: 6.5 MMOL/L (ref 0.5–2)
LEUKOCYTE ESTERASE UR QL STRIP.AUTO: NEGATIVE
LIPASE SERPL-CCNC: 26 U/L (ref 11–82)
LYMPHOCYTES # BLD AUTO: 0.84 K/UL (ref 1–4.8)
LYMPHOCYTES NFR BLD: 43.8 % (ref 22–41)
MAGNESIUM SERPL-MCNC: 1.9 MG/DL (ref 1.5–2.5)
MCH RBC QN AUTO: 33.3 PG (ref 27–33)
MCHC RBC AUTO-ENTMCNC: 33.5 G/DL (ref 33.6–35)
MCV RBC AUTO: 99.3 FL (ref 81.4–97.8)
METHADONE UR QL SCN: NEGATIVE
MICRO URNS: ABNORMAL
MONOCYTES # BLD AUTO: 0.27 K/UL (ref 0–0.85)
MONOCYTES NFR BLD AUTO: 14.1 % (ref 0–13.4)
NEUTROPHILS # BLD AUTO: 0.65 K/UL (ref 2–7.15)
NEUTROPHILS NFR BLD: 33.8 % (ref 44–72)
NITRITE UR QL STRIP.AUTO: NEGATIVE
NRBC # BLD AUTO: 0 K/UL
NRBC BLD-RTO: 0 /100 WBC
OPIATES UR QL SCN: NEGATIVE
OXYCODONE UR QL SCN: NEGATIVE
PCP UR QL SCN: NEGATIVE
PH UR STRIP.AUTO: 5.5 [PH] (ref 5–8)
PLATELET # BLD AUTO: 67 K/UL (ref 164–446)
PMV BLD AUTO: 9.3 FL (ref 9–12.9)
POTASSIUM SERPL-SCNC: 3.2 MMOL/L (ref 3.6–5.5)
PROPOXYPH UR QL SCN: NEGATIVE
PROT SERPL-MCNC: 6.6 G/DL (ref 6–8.2)
PROT UR QL STRIP: 30 MG/DL
PROTHROMBIN TIME: 15.4 SEC (ref 12–14.6)
RBC # BLD AUTO: 4.06 M/UL (ref 4.2–5.4)
RBC # URNS HPF: ABNORMAL /HPF
RBC UR QL AUTO: NEGATIVE
SODIUM SERPL-SCNC: 149 MMOL/L (ref 135–145)
SP GR UR STRIP.AUTO: 1.01
TROPONIN T SERPL-MCNC: 11 NG/L (ref 6–19)
UROBILINOGEN UR STRIP.AUTO-MCNC: 1 MG/DL
WBC # BLD AUTO: 1.9 K/UL (ref 4.8–10.8)
WBC #/AREA URNS HPF: ABNORMAL /HPF

## 2020-03-22 PROCEDURE — 96366 THER/PROPH/DIAG IV INF ADDON: CPT

## 2020-03-22 PROCEDURE — A9270 NON-COVERED ITEM OR SERVICE: HCPCS | Performed by: STUDENT IN AN ORGANIZED HEALTH CARE EDUCATION/TRAINING PROGRAM

## 2020-03-22 PROCEDURE — 83690 ASSAY OF LIPASE: CPT

## 2020-03-22 PROCEDURE — 700111 HCHG RX REV CODE 636 W/ 250 OVERRIDE (IP): Performed by: EMERGENCY MEDICINE

## 2020-03-22 PROCEDURE — 700102 HCHG RX REV CODE 250 W/ 637 OVERRIDE(OP): Performed by: STUDENT IN AN ORGANIZED HEALTH CARE EDUCATION/TRAINING PROGRAM

## 2020-03-22 PROCEDURE — 93005 ELECTROCARDIOGRAM TRACING: CPT | Performed by: EMERGENCY MEDICINE

## 2020-03-22 PROCEDURE — 85730 THROMBOPLASTIN TIME PARTIAL: CPT

## 2020-03-22 PROCEDURE — 96375 TX/PRO/DX INJ NEW DRUG ADDON: CPT

## 2020-03-22 PROCEDURE — 84484 ASSAY OF TROPONIN QUANT: CPT

## 2020-03-22 PROCEDURE — 83605 ASSAY OF LACTIC ACID: CPT | Mod: 91

## 2020-03-22 PROCEDURE — 72125 CT NECK SPINE W/O DYE: CPT

## 2020-03-22 PROCEDURE — 81001 URINALYSIS AUTO W/SCOPE: CPT

## 2020-03-22 PROCEDURE — 71045 X-RAY EXAM CHEST 1 VIEW: CPT

## 2020-03-22 PROCEDURE — 80307 DRUG TEST PRSMV CHEM ANLYZR: CPT

## 2020-03-22 PROCEDURE — 700105 HCHG RX REV CODE 258: Performed by: EMERGENCY MEDICINE

## 2020-03-22 PROCEDURE — 99253 IP/OBS CNSLTJ NEW/EST LOW 45: CPT | Performed by: PSYCHIATRY & NEUROLOGY

## 2020-03-22 PROCEDURE — 36415 COLL VENOUS BLD VENIPUNCTURE: CPT

## 2020-03-22 PROCEDURE — A9270 NON-COVERED ITEM OR SERVICE: HCPCS | Performed by: EMERGENCY MEDICINE

## 2020-03-22 PROCEDURE — 700105 HCHG RX REV CODE 258: Performed by: STUDENT IN AN ORGANIZED HEALTH CARE EDUCATION/TRAINING PROGRAM

## 2020-03-22 PROCEDURE — 80053 COMPREHEN METABOLIC PANEL: CPT

## 2020-03-22 PROCEDURE — 96368 THER/DIAG CONCURRENT INF: CPT

## 2020-03-22 PROCEDURE — 73080 X-RAY EXAM OF ELBOW: CPT | Mod: LT

## 2020-03-22 PROCEDURE — 700111 HCHG RX REV CODE 636 W/ 250 OVERRIDE (IP): Performed by: STUDENT IN AN ORGANIZED HEALTH CARE EDUCATION/TRAINING PROGRAM

## 2020-03-22 PROCEDURE — 85610 PROTHROMBIN TIME: CPT

## 2020-03-22 PROCEDURE — 82550 ASSAY OF CK (CPK): CPT

## 2020-03-22 PROCEDURE — 85025 COMPLETE CBC W/AUTO DIFF WBC: CPT

## 2020-03-22 PROCEDURE — 700102 HCHG RX REV CODE 250 W/ 637 OVERRIDE(OP): Performed by: EMERGENCY MEDICINE

## 2020-03-22 PROCEDURE — 96367 TX/PROPH/DG ADDL SEQ IV INF: CPT

## 2020-03-22 PROCEDURE — 99254 IP/OBS CNSLTJ NEW/EST MOD 60: CPT | Performed by: INTERNAL MEDICINE

## 2020-03-22 PROCEDURE — 82140 ASSAY OF AMMONIA: CPT

## 2020-03-22 PROCEDURE — 770020 HCHG ROOM/CARE - TELE (206)

## 2020-03-22 PROCEDURE — HZ2ZZZZ DETOXIFICATION SERVICES FOR SUBSTANCE ABUSE TREATMENT: ICD-10-PCS | Performed by: INTERNAL MEDICINE

## 2020-03-22 PROCEDURE — 99285 EMERGENCY DEPT VISIT HI MDM: CPT

## 2020-03-22 PROCEDURE — 96372 THER/PROPH/DIAG INJ SC/IM: CPT

## 2020-03-22 PROCEDURE — 83735 ASSAY OF MAGNESIUM: CPT

## 2020-03-22 PROCEDURE — 96365 THER/PROPH/DIAG IV INF INIT: CPT

## 2020-03-22 PROCEDURE — 70450 CT HEAD/BRAIN W/O DYE: CPT

## 2020-03-22 PROCEDURE — 700101 HCHG RX REV CODE 250: Performed by: EMERGENCY MEDICINE

## 2020-03-22 RX ORDER — POLYETHYLENE GLYCOL 3350 17 G/17G
1 POWDER, FOR SOLUTION ORAL
Status: DISCONTINUED | OUTPATIENT
Start: 2020-03-22 | End: 2020-03-23 | Stop reason: HOSPADM

## 2020-03-22 RX ORDER — LORAZEPAM 2 MG/ML
1 INJECTION INTRAMUSCULAR
Status: DISCONTINUED | OUTPATIENT
Start: 2020-03-22 | End: 2020-03-23 | Stop reason: HOSPADM

## 2020-03-22 RX ORDER — LORAZEPAM 2 MG/1
2 TABLET ORAL
Status: DISCONTINUED | OUTPATIENT
Start: 2020-03-22 | End: 2020-03-23 | Stop reason: HOSPADM

## 2020-03-22 RX ORDER — FOLIC ACID 1 MG/1
1 TABLET ORAL DAILY
Status: DISCONTINUED | OUTPATIENT
Start: 2020-03-23 | End: 2020-03-23 | Stop reason: HOSPADM

## 2020-03-22 RX ORDER — LORAZEPAM 2 MG/ML
1.5 INJECTION INTRAMUSCULAR
Status: DISCONTINUED | OUTPATIENT
Start: 2020-03-22 | End: 2020-03-23 | Stop reason: HOSPADM

## 2020-03-22 RX ORDER — GABAPENTIN 300 MG/1
300 CAPSULE ORAL PRN
COMMUNITY
End: 2020-03-27 | Stop reason: CLARIF

## 2020-03-22 RX ORDER — POTASSIUM CHLORIDE 20 MEQ/1
40 TABLET, EXTENDED RELEASE ORAL ONCE
Status: COMPLETED | OUTPATIENT
Start: 2020-03-22 | End: 2020-03-22

## 2020-03-22 RX ORDER — LORAZEPAM 2 MG/ML
2 INJECTION INTRAMUSCULAR ONCE
Status: COMPLETED | OUTPATIENT
Start: 2020-03-22 | End: 2020-03-22

## 2020-03-22 RX ORDER — DIPHENHYDRAMINE HYDROCHLORIDE 50 MG/ML
50 INJECTION INTRAMUSCULAR; INTRAVENOUS ONCE
Status: COMPLETED | OUTPATIENT
Start: 2020-03-22 | End: 2020-03-22

## 2020-03-22 RX ORDER — LABETALOL 200 MG/1
200 TABLET, FILM COATED ORAL EVERY 6 HOURS PRN
Status: DISCONTINUED | OUTPATIENT
Start: 2020-03-22 | End: 2020-03-23 | Stop reason: HOSPADM

## 2020-03-22 RX ORDER — LABETALOL HYDROCHLORIDE 5 MG/ML
10 INJECTION, SOLUTION INTRAVENOUS
Status: DISCONTINUED | OUTPATIENT
Start: 2020-03-22 | End: 2020-03-23 | Stop reason: HOSPADM

## 2020-03-22 RX ORDER — AMOXICILLIN 250 MG
2 CAPSULE ORAL 2 TIMES DAILY
Status: DISCONTINUED | OUTPATIENT
Start: 2020-03-22 | End: 2020-03-23 | Stop reason: HOSPADM

## 2020-03-22 RX ORDER — ACETAMINOPHEN 325 MG/1
650 TABLET ORAL EVERY 6 HOURS PRN
Status: DISCONTINUED | OUTPATIENT
Start: 2020-03-22 | End: 2020-03-23 | Stop reason: HOSPADM

## 2020-03-22 RX ORDER — CHLORDIAZEPOXIDE HYDROCHLORIDE 25 MG/1
25 CAPSULE, GELATIN COATED ORAL EVERY 6 HOURS
Status: DISCONTINUED | OUTPATIENT
Start: 2020-03-23 | End: 2020-03-23

## 2020-03-22 RX ORDER — MAGNESIUM SULFATE HEPTAHYDRATE 40 MG/ML
2 INJECTION, SOLUTION INTRAVENOUS ONCE
Status: COMPLETED | OUTPATIENT
Start: 2020-03-22 | End: 2020-03-22

## 2020-03-22 RX ORDER — SODIUM CHLORIDE, SODIUM LACTATE, POTASSIUM CHLORIDE, CALCIUM CHLORIDE 600; 310; 30; 20 MG/100ML; MG/100ML; MG/100ML; MG/100ML
1000 INJECTION, SOLUTION INTRAVENOUS ONCE
Status: COMPLETED | OUTPATIENT
Start: 2020-03-22 | End: 2020-03-22

## 2020-03-22 RX ORDER — CHLORDIAZEPOXIDE HYDROCHLORIDE 25 MG/1
50 CAPSULE, GELATIN COATED ORAL EVERY 6 HOURS
Status: DISCONTINUED | OUTPATIENT
Start: 2020-03-22 | End: 2020-03-23

## 2020-03-22 RX ORDER — GABAPENTIN 300 MG/1
300 CAPSULE ORAL
Status: DISCONTINUED | OUTPATIENT
Start: 2020-03-22 | End: 2020-03-23 | Stop reason: HOSPADM

## 2020-03-22 RX ORDER — LORAZEPAM 1 MG/1
1 TABLET ORAL EVERY 4 HOURS PRN
Status: DISCONTINUED | OUTPATIENT
Start: 2020-03-22 | End: 2020-03-23 | Stop reason: HOSPADM

## 2020-03-22 RX ORDER — LORAZEPAM 2 MG/1
4 TABLET ORAL
Status: DISCONTINUED | OUTPATIENT
Start: 2020-03-22 | End: 2020-03-23 | Stop reason: HOSPADM

## 2020-03-22 RX ORDER — LORAZEPAM 0.5 MG/1
0.5 TABLET ORAL EVERY 4 HOURS PRN
Status: DISCONTINUED | OUTPATIENT
Start: 2020-03-22 | End: 2020-03-23 | Stop reason: HOSPADM

## 2020-03-22 RX ORDER — LORAZEPAM 2 MG/ML
0.5 INJECTION INTRAMUSCULAR EVERY 4 HOURS PRN
Status: DISCONTINUED | OUTPATIENT
Start: 2020-03-22 | End: 2020-03-23 | Stop reason: HOSPADM

## 2020-03-22 RX ORDER — BISACODYL 10 MG
10 SUPPOSITORY, RECTAL RECTAL
Status: DISCONTINUED | OUTPATIENT
Start: 2020-03-22 | End: 2020-03-23 | Stop reason: HOSPADM

## 2020-03-22 RX ORDER — LEVETIRACETAM 100 MG/ML
500 SOLUTION ORAL EVERY 12 HOURS
Status: DISCONTINUED | OUTPATIENT
Start: 2020-03-23 | End: 2020-03-22

## 2020-03-22 RX ORDER — LORAZEPAM 2 MG/ML
2 INJECTION INTRAMUSCULAR
Status: DISCONTINUED | OUTPATIENT
Start: 2020-03-22 | End: 2020-03-23 | Stop reason: HOSPADM

## 2020-03-22 RX ADMIN — SODIUM CHLORIDE, POTASSIUM CHLORIDE, SODIUM LACTATE AND CALCIUM CHLORIDE 1000 ML: 600; 310; 30; 20 INJECTION, SOLUTION INTRAVENOUS at 13:15

## 2020-03-22 RX ADMIN — THIAMINE HYDROCHLORIDE: 100 INJECTION, SOLUTION INTRAMUSCULAR; INTRAVENOUS at 14:00

## 2020-03-22 RX ADMIN — SENNOSIDES AND DOCUSATE SODIUM 2 TABLET: 8.6; 5 TABLET ORAL at 18:27

## 2020-03-22 RX ADMIN — SODIUM CHLORIDE 1000 MG: 900 INJECTION, SOLUTION INTRAVENOUS at 17:00

## 2020-03-22 RX ADMIN — MAGNESIUM SULFATE 2 G: 2 INJECTION INTRAVENOUS at 18:34

## 2020-03-22 RX ADMIN — POTASSIUM CHLORIDE: 149 INJECTION, SOLUTION, CONCENTRATE INTRAVENOUS at 16:50

## 2020-03-22 RX ADMIN — CHLORDIAZEPOXIDE HYDROCHLORIDE 50 MG: 25 CAPSULE ORAL at 18:27

## 2020-03-22 RX ADMIN — LORAZEPAM 0.5 MG: 0.5 TABLET ORAL at 21:38

## 2020-03-22 RX ADMIN — DIPHENHYDRAMINE HYDROCHLORIDE 50 MG: 50 INJECTION, SOLUTION INTRAMUSCULAR; INTRAVENOUS at 11:00

## 2020-03-22 RX ADMIN — THIAMINE HYDROCHLORIDE 100 MG: 100 INJECTION, SOLUTION INTRAMUSCULAR; INTRAVENOUS at 21:38

## 2020-03-22 RX ADMIN — LORAZEPAM 2 MG: 2 INJECTION INTRAMUSCULAR; INTRAVENOUS at 11:00

## 2020-03-22 RX ADMIN — POTASSIUM CHLORIDE 40 MEQ: 20 TABLET, EXTENDED RELEASE ORAL at 14:00

## 2020-03-22 ASSESSMENT — ENCOUNTER SYMPTOMS
FEVER: 0
NERVOUS/ANXIOUS: 1
HEMOPTYSIS: 0
WEIGHT LOSS: 1
DEPRESSION: 0
SHORTNESS OF BREATH: 0
DOUBLE VISION: 0
ABDOMINAL PAIN: 0
TREMORS: 0
BLURRED VISION: 0
NAUSEA: 0
WHEEZING: 0
CLAUDICATION: 0
NAUSEA: 1
FOCAL WEAKNESS: 0
SEIZURES: 0
SPEECH CHANGE: 0
HEADACHES: 0
TINGLING: 0
WEAKNESS: 0
SENSORY CHANGE: 0
COUGH: 0
BRUISES/BLEEDS EASILY: 0
SINUS PAIN: 0
CONSTIPATION: 0
MEMORY LOSS: 0
SORE THROAT: 0
DIARRHEA: 0
HEARTBURN: 0
MYALGIAS: 0
CHILLS: 0
VOMITING: 1
INSOMNIA: 0
BLOOD IN STOOL: 0
DIZZINESS: 0
BACK PAIN: 0
EYE DISCHARGE: 0
HALLUCINATIONS: 0
SPUTUM PRODUCTION: 0

## 2020-03-22 ASSESSMENT — COPD QUESTIONNAIRES
IN THE PAST 12 MONTHS DO YOU DO LESS THAN YOU USED TO BECAUSE OF YOUR BREATHING PROBLEMS: DISAGREE/UNSURE
COPD SCREENING SCORE: 1
HAVE YOU SMOKED AT LEAST 100 CIGARETTES IN YOUR ENTIRE LIFE: NO/DON'T KNOW
DURING THE PAST 4 WEEKS HOW MUCH DID YOU FEEL SHORT OF BREATH: NONE/LITTLE OF THE TIME
DO YOU EVER COUGH UP ANY MUCUS OR PHLEGM?: NO/ONLY WITH OCCASIONAL COLDS OR INFECTIONS

## 2020-03-22 ASSESSMENT — LIFESTYLE VARIABLES
ORIENTATION AND CLOUDING OF SENSORIUM: ORIENTED AND CAN DO SERIAL ADDITIONS
HOW MANY TIMES IN THE PAST YEAR HAVE YOU HAD 5 OR MORE DRINKS IN A DAY: 1
HEADACHE, FULLNESS IN HEAD: NOT PRESENT
ON A TYPICAL DAY WHEN YOU DRINK ALCOHOL HOW MANY DRINKS DO YOU HAVE: 7
HAVE YOU EVER FELT YOU SHOULD CUT DOWN ON YOUR DRINKING: YES
PAROXYSMAL SWEATS: NO SWEAT VISIBLE
DO YOU DRINK ALCOHOL: YES
ALCOHOL_USE: YES
VISUAL DISTURBANCES: NOT PRESENT
AUDITORY DISTURBANCES: NOT PRESENT
NAUSEA AND VOMITING: NO NAUSEA AND NO VOMITING
ORIENTATION AND CLOUDING OF SENSORIUM: ORIENTED AND CAN DO SERIAL ADDITIONS
PAROXYSMAL SWEATS: NO SWEAT VISIBLE
VISUAL DISTURBANCES: NOT PRESENT
TOTAL SCORE: 2
HAVE PEOPLE ANNOYED YOU BY CRITICIZING YOUR DRINKING: YES
TOTAL SCORE: 4
HAVE YOU EVER FELT YOU SHOULD CUT DOWN ON YOUR DRINKING: YES
AUDITORY DISTURBANCES: NOT PRESENT
HEADACHE, FULLNESS IN HEAD: NOT PRESENT
TOTAL SCORE: 6
ANXIETY: *
EVER FELT BAD OR GUILTY ABOUT YOUR DRINKING: YES
DOES PATIENT WANT TO STOP DRINKING: YES
AUDITORY DISTURBANCES: NOT PRESENT
TOTAL SCORE: 2
HEADACHE, FULLNESS IN HEAD: VERY MILD
CONSUMPTION TOTAL: INCOMPLETE
EVER HAD A DRINK FIRST THING IN THE MORNING TO STEADY YOUR NERVES TO GET RID OF A HANGOVER: NO
VISUAL DISTURBANCES: NOT PRESENT
NAUSEA AND VOMITING: NO NAUSEA AND NO VOMITING
HAVE PEOPLE ANNOYED YOU BY CRITICIZING YOUR DRINKING: YES
TREMOR: MODERATE TREMOR WITH ARMS EXTENDED
TREMOR: *
TOTAL SCORE: 2
AGITATION: MODERATELY FIDGETY AND RESTLESS
ANXIETY: MODERATELY ANXIOUS OR GUARDED, SO ANXIETY IS INFERRED
CONSUMPTION TOTAL: POSITIVE
NAUSEA AND VOMITING: NO NAUSEA AND NO VOMITING
VISUAL DISTURBANCES: NOT PRESENT
ORIENTATION AND CLOUDING OF SENSORIUM: ORIENTED AND CAN DO SERIAL ADDITIONS
EVER_SMOKED: YES
TOTAL SCORE: 4
TOTAL SCORE: 13
ORIENTATION AND CLOUDING OF SENSORIUM: ORIENTED AND CAN DO SERIAL ADDITIONS
NAUSEA AND VOMITING: NO NAUSEA AND NO VOMITING
SUBSTANCE_ABUSE: 1
ANXIETY: MODERATELY ANXIOUS OR GUARDED, SO ANXIETY IS INFERRED
AUDITORY DISTURBANCES: NOT PRESENT
AGITATION: SOMEWHAT MORE THAN NORMAL ACTIVITY
TREMOR: TREMOR NOT VISIBLE BUT CAN BE FELT, FINGERTIP TO FINGERTIP
TOTAL SCORE: 5
ANXIETY: *
HEADACHE, FULLNESS IN HEAD: NOT PRESENT
PAROXYSMAL SWEATS: NO SWEAT VISIBLE
EVER HAD A DRINK FIRST THING IN THE MORNING TO STEADY YOUR NERVES TO GET RID OF A HANGOVER: YES
PAROXYSMAL SWEATS: NO SWEAT VISIBLE
TOTAL SCORE: 4
EVER FELT BAD OR GUILTY ABOUT YOUR DRINKING: NO
TOTAL SCORE: 12
DOES PATIENT WANT TO TALK TO SOMEONE ABOUT QUITTING: NO
TREMOR: MODERATE TREMOR WITH ARMS EXTENDED
AGITATION: *
AVERAGE NUMBER OF DAYS PER WEEK YOU HAVE A DRINK CONTAINING ALCOHOL: 4
AGITATION: *

## 2020-03-22 ASSESSMENT — FIBROSIS 4 INDEX
FIB4 SCORE: 12.81
FIB4 SCORE: 12.61

## 2020-03-22 ASSESSMENT — PATIENT HEALTH QUESTIONNAIRE - PHQ9
SUM OF ALL RESPONSES TO PHQ9 QUESTIONS 1 AND 2: 0
1. LITTLE INTEREST OR PLEASURE IN DOING THINGS: NOT AT ALL
2. FEELING DOWN, DEPRESSED, IRRITABLE, OR HOPELESS: NOT AT ALL

## 2020-03-22 NOTE — ED PROVIDER NOTES
"ED Provider Note    Scribed for Noemi Calloway M.D. by Oren Boyd. 3/22/2020  10:41 AM    Primary care provider: Shorty Ibarra M.D.  Means of arrival: Wheel chair  History obtained from: patient and   History limited by: none  CHIEF COMPLAINT  No chief complaint on file.  Head Injury    HPI  Zari Day is a 54 y.o. female with a history of lupus, who presents to the ED for a head injury that occurred two days ago. Per the patient, she slipped on some ice, fell backwards, and hit her head. There was no loss of consciousness as she was able to recall the entire event. She currently has a hematoma to her left parietal scalp, left elbow pain, and left sided body pain secondary to her fall. The  brought the patient to the ED to be evaluated as she has had traumatic brain injuries in the past. Per the , the patient has been acting extremely strange, irrational, confused, and extremely volatile since her fall two days ago. Patient is able to ambulate without any difficulty. During evaluation, patient would repeated say \"I just want to go home and go to bed\". She denies any nausea, vomiting, or diarrhea. No headache. No fever or throat pain. No neck pain, rib pain, hip pain, or other musculoskeletal symptoms. No coughs, runny nose, or sore throat. She also denies any recent alcohol use, blood thinner use, or any recent contact with individuals tested positive for COVID-19. Patient endorses marijuana use. She additionally reports that two of her PCPs quit on her within the last 7 months.Patient adamantly denies drinking any alcohol.    REVIEW OF SYSTEMS  See HPI for further details.     Pertinent positives include: Hematoma to left parietal scalp, left elbow pain, confusion  Pertinent negatives include: any nausea, vomiting, or diarrhea. No headache. No fever or throat pain. No neck pain, rib pain, hip pain, or other musculoskeletal symptoms. No coughs, runny nose, or sore throat.    All other " systems are negative.    PAST MEDICAL HISTORY  Past Medical History:   Diagnosis Date   • Alcoholic hepatitis    • Sebaceous cyst 9/12/2018   • Seizure cerebral 6/22/2018   • Thrombocytopenia (HCC)        FAMILY HISTORY  Family History   Problem Relation Age of Onset   • Hypertension Mother    • Alcohol/Drug Mother    • Breast Cancer Mother    • Hypertension Father    • Alcohol/Drug Father    • Autoimmune Disease Daughter         lupus   • Autoimmune Disease Son         colitis       SOCIAL HISTORY  Social History     Tobacco Use   • Smoking status: Former Smoker     Years: 5.00   • Smokeless tobacco: Never Used   • Tobacco comment: smoked in her teens    Substance Use Topics   • Alcohol use: No   • Drug use: Yes     Types: Marijuana      Social History     Substance and Sexual Activity   Drug Use Yes   • Types: Marijuana       SURGICAL HISTORY  Past Surgical History:   Procedure Laterality Date   • BONE MARROW ASPIRATION Left 3/7/2019    Procedure: BONE MARROW ASPIRATION - ESTER;  Surgeon: Tiffanie Osullivan M.D.;  Location: [facility];  Service: Orthopedics   • BONE MARROW BIOPSY, NDL/TROCAR Left 3/7/2019    Procedure: BONE MARROW BIOPSY, NDL/TROCAR;  Surgeon: Tiffanie Osullivan M.D.;  Location: ENDOSCOPY [facility];  Service: Orthopedics       CURRENT MEDICATIONS    Current Facility-Administered Medications:   •  potassium chloride 20 mEq in LR 1,000 mL infusion, , Intravenous, Continuous, Veronika Benitez M.D., Last Rate: 125 mL/hr at 03/22/20 1650  •  senna-docusate (PERICOLACE or SENOKOT S) 8.6-50 MG per tablet 2 Tab, 2 Tab, Oral, BID, 2 Tab at 03/22/20 1827 **AND** polyethylene glycol/lytes (MIRALAX) PACKET 1 Packet, 1 Packet, Oral, QDAY PRN **AND** magnesium hydroxide (MILK OF MAGNESIA) suspension 30 mL, 30 mL, Oral, QDAY PRN **AND** bisacodyl (DULCOLAX) suppository 10 mg, 10 mg, Rectal, QDAY PRN, Annmarie Groves M.D.  •  acetaminophen (TYLENOL) tablet 650 mg, 650 mg, Oral, Q6HRS PRN, Annmarie Rincon  SANIA Groves  •  labetalol (NORMODYNE/TRANDATE) injection 10 mg, 10 mg, Intravenous, Q HOUR PRN **OR** labetalol (NORMODYNE) tablet 200 mg, 200 mg, Oral, Q6HRS PRN, Annmarie Groves M.D.  •  LORazepam (ATIVAN) tablet 0.5 mg, 0.5 mg, Oral, Q4HRS PRN, Annmarie Groves M.D.  •  LORazepam (ATIVAN) tablet 1 mg, 1 mg, Oral, Q4HRS PRN **OR** LORazepam (ATIVAN) injection 0.5 mg, 0.5 mg, Intravenous, Q4HRS PRN, Annmarie Groves M.D.  •  LORazepam (ATIVAN) tablet 2 mg, 2 mg, Oral, Q2HRS PRN **OR** LORazepam (ATIVAN) injection 1 mg, 1 mg, Intravenous, Q2HRS PRN, Annmarie Groves M.D.  •  LORazepam (ATIVAN) tablet 3 mg, 3 mg, Oral, Q HOUR PRN **OR** LORazepam (ATIVAN) injection 1.5 mg, 1.5 mg, Intravenous, Q HOUR PRN, Annmarie Groves M.D.  •  LORazepam (ATIVAN) tablet 4 mg, 4 mg, Oral, Q15 MIN PRN **OR** LORazepam (ATIVAN) injection 2 mg, 2 mg, Intravenous, Q15 MIN PRN, Annmarie Groves M.D.  •  chlordiazePOXIDE (LIBRIUM) capsule 50 mg, 50 mg, Oral, Q6HRS, 50 mg at 03/22/20 1827 **FOLLOWED BY** [START ON 3/23/2020] chlordiazePOXIDE (LIBRIUM) capsule 25 mg, 25 mg, Oral, Q6HRS, Annmarie Groves M.D.  •  magnesium sulfate IVPB premix 2 g, 2 g, Intravenous, Once, Last Rate: 25 mL/hr at 03/22/20 1834, 2 g at 03/22/20 1834 **AND** [START ON 3/23/2020] magnesium oxide (MAG-OX) tablet 400 mg, 400 mg, Oral, BID, Annmarie Groves M.D.  •  gabapentin (NEURONTIN) capsule 300 mg, 300 mg, Oral, QHS PRN, Annmarie Groves M.D.  •  [START ON 3/23/2020] levETIRAcetam (KEPPRA) 500 mg in  mL IVPB, 500 mg, Intravenous, Q12HRS, Veronika Benitez M.D.  •  thiamine (B-1) 100 mg in D5W 250 mL IVPB, 100 mg, Intravenous, Q8HRS, Eleazar Carter M.D.    Current Outpatient Medications:   •  gabapentin (NEURONTIN) 300 MG Cap, Take 300 mg by mouth as needed (pain)., Disp: , Rfl:   •  zonisamide (ZONEGRAN) 100 MG Cap, Take 1 Cap by mouth every bedtime., Disp: 30 Cap, Rfl: 11      ALLERGIES  No Known Allergies    PHYSICAL EXAM  VITAL SIGNS: /96   Pulse 89    Temp 36.6 °C (97.8 °F) (Temporal)   Resp (!) 22   LMP 05/05/2017   SpO2 95%      Constitutional: Difficult to assess due to her behavior. She is uncooperative, labile, and uncooperative  HENT: Normocephalic; Bilateral external ears normal; Oropharynx with moist mucous membranes; No exudates in the posterior oropharynx. She has erythema to her posterior oropharynx. She has a hematoma to her left parietal scalp.   Eyes: Pupils are 4mm and equal, EOMI, Conjunctiva normal. No discharge.   Neck:  Supple, non tender midline; No stridor; No nuchal rigidity.   Lymphatic: No cervical lymphadenopathy noted.   Cardiovascular: Tachycardic rate and regular rhythm without murmurs, rubs, or gallop.   Thorax & Lungs: No respiratory distress, breath sounds clear to auscultation bilaterally without wheezing, rales or rhonchi. Non tender chest wall. No crepitus or subcutaneous air  Abdomen: Soft, non tender, bowel sounds normal. No obvious masses; No pulsatile masses; no rebound, guarding, or peritoneal signs.   Skin: Good color; warm and dry without rash or petechia.  Back: Non tender, No CVA tenderness.   Extremities: Distal radial, dorsalis pedis, posterior tibial pulses are equal bilaterally; No edema; Non tender calves or saphenous, No cyanosis, No clubbing.   Musculoskeletal: Good range of motion in all major joints. No tenderness to palpation or major deformities noted.   Neurologic: Oriented to everything but the month, clear speech. Gait is stable.    EKG  12 Lead EKG interpreted by me as shown below.     LABS/RADIOLOGY/PROCEDURES  Results for orders placed or performed during the hospital encounter of 03/22/20   DIAGNOSTIC ALCOHOL   Result Value Ref Range    Diagnostic Alcohol 357.4 (H) 0.0 - 10.1 mg/dL   CBC WITH DIFFERENTIAL   Result Value Ref Range    WBC 1.9 (LL) 4.8 - 10.8 K/uL    RBC 4.06 (L) 4.20 - 5.40 M/uL    Hemoglobin 13.5 12.0 - 16.0 g/dL    Hematocrit 40.3 37.0 - 47.0 %    MCV 99.3 (H) 81.4 - 97.8 fL     MCH 33.3 (H) 27.0 - 33.0 pg    MCHC 33.5 (L) 33.6 - 35.0 g/dL    RDW 53.0 (H) 35.9 - 50.0 fL    Platelet Count 67 (L) 164 - 446 K/uL    MPV 9.3 9.0 - 12.9 fL    Neutrophils-Polys 33.80 (L) 44.00 - 72.00 %    Lymphocytes 43.80 (H) 22.00 - 41.00 %    Monocytes 14.10 (H) 0.00 - 13.40 %    Eosinophils 2.60 0.00 - 6.90 %    Basophils 4.70 (H) 0.00 - 1.80 %    Immature Granulocytes 1.00 (H) 0.00 - 0.90 %    Nucleated RBC 0.00 /100 WBC    Neutrophils (Absolute) 0.65 (L) 2.00 - 7.15 K/uL    Lymphs (Absolute) 0.84 (L) 1.00 - 4.80 K/uL    Monos (Absolute) 0.27 0.00 - 0.85 K/uL    Eos (Absolute) 0.05 0.00 - 0.51 K/uL    Baso (Absolute) 0.09 0.00 - 0.12 K/uL    Immature Granulocytes (abs) 0.02 0.00 - 0.11 K/uL    NRBC (Absolute) 0.00 K/uL   COMP METABOLIC PANEL   Result Value Ref Range    Sodium 149 (H) 135 - 145 mmol/L    Potassium 3.2 (L) 3.6 - 5.5 mmol/L    Chloride 109 96 - 112 mmol/L    Co2 20 20 - 33 mmol/L    Anion Gap 20.0 (H) 7.0 - 16.0    Glucose 86 65 - 99 mg/dL    Bun 8 8 - 22 mg/dL    Creatinine 0.78 0.50 - 1.40 mg/dL    Calcium 9.2 8.5 - 10.5 mg/dL    AST(SGOT) 104 (H) 12 - 45 U/L    ALT(SGPT) 41 2 - 50 U/L    Alkaline Phosphatase 134 (H) 30 - 99 U/L    Total Bilirubin 2.3 (H) 0.1 - 1.5 mg/dL    Albumin 4.2 3.2 - 4.9 g/dL    Total Protein 6.6 6.0 - 8.2 g/dL    Globulin 2.4 1.9 - 3.5 g/dL    A-G Ratio 1.8 g/dL   LIPASE   Result Value Ref Range    Lipase 26 11 - 82 U/L   TROPONIN   Result Value Ref Range    Troponin T 11 6 - 19 ng/L   CREATINE KINASE   Result Value Ref Range    CPK Total 147 0 - 154 U/L   LACTIC ACID   Result Value Ref Range    Lactic Acid 6.5 (HH) 0.5 - 2.0 mmol/L   URINALYSIS CULTURE, IF INDICATED   Result Value Ref Range    Color DK Yellow     Character Clear     Specific Gravity 1.012 <1.035    Ph 5.5 5.0 - 8.0    Glucose Negative Negative mg/dL    Ketones Negative Negative mg/dL    Protein 30 (A) Negative mg/dL    Bilirubin Negative Negative    Urobilinogen, Urine 1.0 Negative    Nitrite  Negative Negative    Leukocyte Esterase Negative Negative    Occult Blood Negative Negative    Micro Urine Req Microscopic    PROTHROMBIN TIME (INR)   Result Value Ref Range    PT 15.4 (H) 12.0 - 14.6 sec    INR 1.19 (H) 0.87 - 1.13   APTT   Result Value Ref Range    APTT 33.5 24.7 - 36.0 sec   URINE DRUG SCREEN   Result Value Ref Range    Amphetamines Urine Negative Negative    Barbiturates Negative Negative    Benzodiazepines Negative Negative    Cocaine Metabolite Negative Negative    Methadone Negative Negative    Opiates Negative Negative    Oxycodone Negative Negative    Phencyclidine -Pcp Negative Negative    Propoxyphene Negative Negative    Cannabinoid Metab Positive (A) Negative   AMMONIA   Result Value Ref Range    Ammonia 36 11 - 45 umol/L   ESTIMATED GFR   Result Value Ref Range    GFR If African American >60 >60 mL/min/1.73 m 2    GFR If Non African American >60 >60 mL/min/1.73 m 2   MAGNESIUM   Result Value Ref Range    Magnesium 1.9 1.5 - 2.5 mg/dL   LACTIC ACID   Result Value Ref Range    Lactic Acid 5.7 (HH) 0.5 - 2.0 mmol/L   URINE MICROSCOPIC (W/UA)   Result Value Ref Range    WBC 0-2 /hpf    RBC 2-5 (A) /hpf    Bacteria Negative None /hpf    Epithelial Cells Negative /hpf    Hyaline Cast 3-5 (A) /lpf   LACTIC ACID   Result Value Ref Range    Lactic Acid 5.4 (HH) 0.5 - 2.0 mmol/L   EKG (NOW)   Result Value Ref Range    Report       [facility] Emergency Dept.    Test Date:  2020  Pt Name:    GUMARO ALLEN                  Department: ER  MRN:        [id number]                      Room:       [facility]  Gender:     Female                       Technician: [id number]  :        1965                   Requested By:NOEMI CALLOWAY  Order #:    [id number]                    Reading MD: Noemi Calloway    Measurements  Intervals                                Axis  Rate:       123                          P:          53  NJ:         144                          QRS:        61  QRSD:       86                            T:          -66  QT:         284  QTc:        407    Interpretive Statements  SINUS TACHYCARDIA rate 123  Normal axis  Normal intervals  No ST elevation  Minimal ST depression V3-V6  BORDERLINE LOW VOLTAGE IN FRONTAL LEADS  BORDERLINE T ABNORMALITIES, INFERIOR LEADS  Compared to ECG 11/14/2017 05:12:52  T-wave abnormality now present  Sinus rhythm no longer present  Electronically Signed On 3-22 -2020 14:04:21 PDT by Noemi Calloway             CT-HEAD W/O   Final Result      1.  No evidence of acute intracranial process.      2.  Atrophy.      CT-CSPINE WITHOUT PLUS RECONS   Final Result      1.  No evidence of cervical spine fracture.      2.  Multilevel degenerative disc disease.      DX-CHEST-PORTABLE (1 VIEW)   Final Result      No evidence of acute cardiopulmonary process.      DX-ELBOW-COMPLETE 3+ LEFT   Final Result      No evidence of acute fracture or dislocation.          COURSE & MEDICAL DECISION MAKING  Pertinent Labs & Imaging studies reviewed. (See chart for details)    Reviewed patient's old medical records which showed the patient has a history of alcohol abuse, withdrawal, and alcohol seizures. One year ago on February 19th, patient was here on a legal hold which was initiated by [facility]     10:41 AM - Patient seen and examined at bedside. Discussed plan of care. Labs and imaging were ordered.     11:00 AM- The nurse informed me that the patient had what appeared to be an absent seizures for approximately one minute after administering medications. There were no tonic-clonic movements and patient was said to be in an post ictal state for 10 minutes.    1:51 PM Paged [facility].    1:59 PM-Patient was reevaluated at bedside. Discussed lab and radiology results with the patient and informed them on the plan for hospitalization for continued observation. Patient verbalizes understanding and agreement to this plan of care.      2:00 PM Spoke with Dr. Benitez, [facility], about  "the patient's condition. Dr. Benitez will evaluate the patient for hospitalization.     HYDRATION: Based on the patient's presentation of Other Lactic acidosis and tachycardia the patient was given IV fluids. IV Hydration was used because oral hydration was not adequate alone. Upon recheck following hydration, the patient was improved.    Patient presents to the ER for evaluation of possible head injury after a slip and fall which occurred on ice 2 days ago.  Her  was concerned because she has been \"acting funny.\"  On arrival the patient is screaming and hollering as she is walking down the hallway to room 16 where she is going to be room.  She is swearing.  She is using the F word.  She says she does not need to be here and she is wants to go home.  She is belligerent.  She is aggressive.  She is hostile.  She is cursing at everyone she can see.  Security was at bedside.  I quickly reviewed the patient chart.  She has history of alcohol abuse and alcohol withdrawal.  About a year ago she was here with suicidal ideation.  The patient was awake and alert.  She was ambulating with a steady gait.  Her speech was clear.  She was oriented to president, year, hospital, and city.  She was not oriented to the month.  She complained of a hematoma to her left head.  There was no overlying scalp laceration.  She had no complaint of neck pain.  She had a nontender C-spine.  Patient was tachycardic upon arrival with heart rate in the 140s.  With her history of alcohol abuse, is concerned that she might be in alcohol withdrawal.  She was belligerent so I gave her some IM Ativan and Benadryl.  Right is a nurse was administering IM Benadryl and Ativan, she had what looks like a seizure.  She became rigid.  There was no shaking activity but her eyes rolled back in her head.  Her pupils enlarged.  She was a little confused/postictal for about 10 minutes.  CT brain was performed.  CT brain is negative.  No evidence for head " "bleed.  Patient tells me that she has seizure disorder.  She says it is \"not related to her alcohol use however.  Patient was adamant that she does not use any alcohol.  She told me she has not drank in a long time.  However, alcohol level came back at 357.  When I confronted the patient she told me I was lying about her alcohol level.  After some discussion, she then admitted that she had fallen off the wagon about 2 weeks ago.  Patient's labs are abnormal.  She has leukopenia.  She has history of pancytopenia related to alcohol abuse, but her white blood cell count is much lower than it had been previously.  Additionally, her platelets are 67, lower than they have been in the past as well.  She has a markedly elevated lactic acid level.  She is a high anion gap.  I suspect that her lactic acidosis and anion gap acidosis are related to alcoholic ketoacidosis.  She has low potassium.  She was given potassium supplementation as well as a rally bag.  She was given IV fluids for her lactic acidosis.  Patient continued to threaten to leave.  I told her that with her intoxication she could not leave.  Her  wanted her to get help and refused to take her home so that she can get the help she needed.  Patient finally acquiesced and agreed to stay.  Occasionally she escalates, but overall has been fairly cooperative throughout her ED stay.  Heart rate is come down with IV Ativan and fluids.  At this time she will be hospitalized under the care of [facility] internal medicine service for lactic acidosis, high anion gap metabolic acidosis, alcohol intoxication with high likelihood of withdrawal, and seizure which occurred here in the ER today.    CRITICAL CARE  The very real possibilty of a deterioration of this patient's condition required the highest level of my preparedness for sudden, emergent intervention.  I provided critical care services, which included medication orders, frequent reevaluations of the " patient's condition and response to treatment, ordering and reviewing test results, and discussing the case with various consultants.  The critical care time associated with the care of the patient was 35 minutes. Review chart for interventions. This time is exclusive of any other billable procedures.         DISPOSITION:  Patient will be hospitalized by Dr. Benitez in guarded condition.    FINAL IMPRESSION  1. Alcohol abuse Acute   2. Lactic acidosis Acute   3. Alcoholic intoxication with complication (HCC) Acute   4. Seizure (HCC) Acute   5. Hypokalemia Acute     The critical care time associated with the care of the patient was 35 minutes. Review chart for interventions. This time is exclusive of any other billable procedures.     This dictation has been created using voice recognition software. The accuracy of the dictation is limited by the abilities of the software. I expect there may be some errors of grammar and possibly content. I made every attempt to manually correct the errors within my dictation. However, errors related to voice recognition software may still exist and should be interpreted within the appropriate context.     Oren FONTENOT (Rereibe), am scribing for, and in the presence of, Noemi Calloway M.D..    Electronically signed by: Oren Boyd (Bhakti), 3/22/2020    Noemi FONTENOT M.D. personally performed the services described in this documentation, as scribed by Oren Boyd in my presence, and it is both accurate and complete. C.    The note accurately reflects work and decisions made by me.  Noemi Calloway M.D.  3/22/2020  7:26 PM

## 2020-03-22 NOTE — ED NOTES
Assisted primary RN with helping patinet to the bathroom.  reports hx of tbi with recent fall and pt has not been able to properly care for herself. Pt combative and yelling and not making sense in her speaking. Pt appears to be a possible danger to herself. Placed pt on a legal hold.

## 2020-03-22 NOTE — ED NOTES
Pt was taken from triage to BL 16 by WC accompanied by spouse. During transport pt began to lean heavily to right side of WC, tech attempted to correct pts position, pt had verbal expletive outbursts when tech touched pts L shoulder. Pt continued to yell and be aggressive till arrival to BL 16. Pts RN notified of pt arrival.

## 2020-03-22 NOTE — ED NOTES
Pt was acting erratic screaming and tried to leave. Pt was able to calm down and returned to her room. Pt was placed on a legal hold.

## 2020-03-22 NOTE — ED NOTES
MD at bedside, pt resettled after being out in hallway demanding to call her . Pt given phone to use to call . Pt wants to leave AMA, multiple attempts to reorient pt as to why she is here and that she is unable to leave hospital bc of legal hold.

## 2020-03-22 NOTE — ED NOTES
Pt became limp unresponsive and had dilated pupils. Pt was breathing and had a pulse. Pt was placed on o2 NC @4lpm. Pt was changed and was becoming more responsive a was pulling off monitoring equipment. After 15 mins pt returned to being confused and uncooperative.

## 2020-03-23 VITALS
SYSTOLIC BLOOD PRESSURE: 131 MMHG | DIASTOLIC BLOOD PRESSURE: 98 MMHG | BODY MASS INDEX: 23.22 KG/M2 | TEMPERATURE: 98.4 F | HEIGHT: 68 IN | OXYGEN SATURATION: 95 % | HEART RATE: 113 BPM | WEIGHT: 153.22 LBS | RESPIRATION RATE: 18 BRPM

## 2020-03-23 PROBLEM — R56.9 SEIZURE (HCC): Status: RESOLVED | Noted: 2020-03-22 | Resolved: 2020-03-23

## 2020-03-23 PROBLEM — E87.20 LACTIC ACID ACIDOSIS: Status: RESOLVED | Noted: 2020-03-22 | Resolved: 2020-03-23

## 2020-03-23 PROBLEM — R11.0 NAUSEA: Status: RESOLVED | Noted: 2019-02-25 | Resolved: 2020-03-23

## 2020-03-23 PROBLEM — E87.6 HYPOKALEMIA: Status: RESOLVED | Noted: 2017-11-12 | Resolved: 2020-03-23

## 2020-03-23 PROBLEM — R79.89 LACTATE BLOOD INCREASE: Status: RESOLVED | Noted: 2020-03-22 | Resolved: 2020-03-23

## 2020-03-23 PROBLEM — A41.9 SEPSIS (HCC): Status: RESOLVED | Noted: 2020-03-22 | Resolved: 2020-03-23

## 2020-03-23 PROBLEM — F10.929 ALCOHOL INTOXICATION (HCC): Status: RESOLVED | Noted: 2020-03-22 | Resolved: 2020-03-23

## 2020-03-23 LAB
ALBUMIN SERPL BCP-MCNC: 3.5 G/DL (ref 3.2–4.9)
ALBUMIN/GLOB SERPL: 1.8 G/DL
ALP SERPL-CCNC: 107 U/L (ref 30–99)
ALT SERPL-CCNC: 32 U/L (ref 2–50)
ANION GAP SERPL CALC-SCNC: 14 MMOL/L (ref 7–16)
AST SERPL-CCNC: 73 U/L (ref 12–45)
BASOPHILS # BLD AUTO: 2.2 % (ref 0–1.8)
BASOPHILS # BLD: 0.04 K/UL (ref 0–0.12)
BILIRUB SERPL-MCNC: 1.6 MG/DL (ref 0.1–1.5)
BUN SERPL-MCNC: 6 MG/DL (ref 8–22)
CALCIUM SERPL-MCNC: 8.8 MG/DL (ref 8.5–10.5)
CHLORIDE SERPL-SCNC: 110 MMOL/L (ref 96–112)
CO2 SERPL-SCNC: 20 MMOL/L (ref 20–33)
CREAT SERPL-MCNC: 0.49 MG/DL (ref 0.5–1.4)
EOSINOPHIL # BLD AUTO: 0.03 K/UL (ref 0–0.51)
EOSINOPHIL NFR BLD: 1.7 % (ref 0–6.9)
ERYTHROCYTE [DISTWIDTH] IN BLOOD BY AUTOMATED COUNT: 52.4 FL (ref 35.9–50)
GLOBULIN SER CALC-MCNC: 2 G/DL (ref 1.9–3.5)
GLUCOSE SERPL-MCNC: 65 MG/DL (ref 65–99)
HCT VFR BLD AUTO: 34 % (ref 37–47)
HGB BLD-MCNC: 11 G/DL (ref 12–16)
IMM GRANULOCYTES # BLD AUTO: 0 K/UL (ref 0–0.11)
IMM GRANULOCYTES NFR BLD AUTO: 0 % (ref 0–0.9)
LACTATE BLD-SCNC: 1.9 MMOL/L (ref 0.5–2)
LYMPHOCYTES # BLD AUTO: 1.01 K/UL (ref 1–4.8)
LYMPHOCYTES NFR BLD: 56.4 % (ref 22–41)
MAGNESIUM SERPL-MCNC: 2.1 MG/DL (ref 1.5–2.5)
MCH RBC QN AUTO: 32.7 PG (ref 27–33)
MCHC RBC AUTO-ENTMCNC: 32.4 G/DL (ref 33.6–35)
MCV RBC AUTO: 101.2 FL (ref 81.4–97.8)
MONOCYTES # BLD AUTO: 0.32 K/UL (ref 0–0.85)
MONOCYTES NFR BLD AUTO: 17.9 % (ref 0–13.4)
NEUTROPHILS # BLD AUTO: 0.39 K/UL (ref 2–7.15)
NEUTROPHILS NFR BLD: 21.8 % (ref 44–72)
NRBC # BLD AUTO: 0 K/UL
NRBC BLD-RTO: 0 /100 WBC
PLATELET # BLD AUTO: 60 K/UL (ref 164–446)
PMV BLD AUTO: 9.9 FL (ref 9–12.9)
POTASSIUM SERPL-SCNC: 3.9 MMOL/L (ref 3.6–5.5)
PROT SERPL-MCNC: 5.5 G/DL (ref 6–8.2)
RBC # BLD AUTO: 3.36 M/UL (ref 4.2–5.4)
SODIUM SERPL-SCNC: 144 MMOL/L (ref 135–145)
WBC # BLD AUTO: 1.8 K/UL (ref 4.8–10.8)

## 2020-03-23 PROCEDURE — 700102 HCHG RX REV CODE 250 W/ 637 OVERRIDE(OP): Performed by: STUDENT IN AN ORGANIZED HEALTH CARE EDUCATION/TRAINING PROGRAM

## 2020-03-23 PROCEDURE — 36415 COLL VENOUS BLD VENIPUNCTURE: CPT

## 2020-03-23 PROCEDURE — A9270 NON-COVERED ITEM OR SERVICE: HCPCS | Performed by: STUDENT IN AN ORGANIZED HEALTH CARE EDUCATION/TRAINING PROGRAM

## 2020-03-23 PROCEDURE — 99238 HOSP IP/OBS DSCHRG MGMT 30/<: CPT | Mod: GC | Performed by: INTERNAL MEDICINE

## 2020-03-23 PROCEDURE — 83605 ASSAY OF LACTIC ACID: CPT

## 2020-03-23 PROCEDURE — 700111 HCHG RX REV CODE 636 W/ 250 OVERRIDE (IP): Performed by: STUDENT IN AN ORGANIZED HEALTH CARE EDUCATION/TRAINING PROGRAM

## 2020-03-23 PROCEDURE — 80053 COMPREHEN METABOLIC PANEL: CPT

## 2020-03-23 PROCEDURE — 700105 HCHG RX REV CODE 258: Performed by: STUDENT IN AN ORGANIZED HEALTH CARE EDUCATION/TRAINING PROGRAM

## 2020-03-23 PROCEDURE — 83735 ASSAY OF MAGNESIUM: CPT

## 2020-03-23 PROCEDURE — 85025 COMPLETE CBC W/AUTO DIFF WBC: CPT

## 2020-03-23 RX ORDER — FOLIC ACID 1 MG/1
1 TABLET ORAL DAILY
Qty: 30 TAB | Refills: 0 | Status: SHIPPED | OUTPATIENT
Start: 2020-03-24 | End: 2020-04-14 | Stop reason: SDUPTHER

## 2020-03-23 RX ORDER — LEVETIRACETAM 500 MG/1
500 TABLET ORAL 2 TIMES DAILY
Qty: 60 TAB | Refills: 0 | Status: SHIPPED | OUTPATIENT
Start: 2020-03-23 | End: 2020-03-31 | Stop reason: SDUPTHER

## 2020-03-23 RX ORDER — LANOLIN ALCOHOL/MO/W.PET/CERES
100 CREAM (GRAM) TOPICAL DAILY
Qty: 30 TAB | Refills: 0 | Status: SHIPPED | OUTPATIENT
Start: 2020-03-23 | End: 2020-04-16 | Stop reason: SDUPTHER

## 2020-03-23 RX ADMIN — THIAMINE HYDROCHLORIDE 100 MG: 100 INJECTION, SOLUTION INTRAMUSCULAR; INTRAVENOUS at 08:45

## 2020-03-23 RX ADMIN — POTASSIUM CHLORIDE: 149 INJECTION, SOLUTION, CONCENTRATE INTRAVENOUS at 08:44

## 2020-03-23 RX ADMIN — Medication 400 MG: at 05:34

## 2020-03-23 RX ADMIN — SODIUM CHLORIDE 500 MG: 9 INJECTION, SOLUTION INTRAVENOUS at 05:34

## 2020-03-23 RX ADMIN — THERA TABS 1 TABLET: TAB at 05:34

## 2020-03-23 RX ADMIN — FOLIC ACID 1 MG: 1 TABLET ORAL at 05:34

## 2020-03-23 RX ADMIN — CHLORDIAZEPOXIDE HYDROCHLORIDE 50 MG: 25 CAPSULE ORAL at 00:06

## 2020-03-23 RX ADMIN — CHLORDIAZEPOXIDE HYDROCHLORIDE 50 MG: 25 CAPSULE ORAL at 05:34

## 2020-03-23 RX ADMIN — POTASSIUM CHLORIDE: 149 INJECTION, SOLUTION, CONCENTRATE INTRAVENOUS at 00:09

## 2020-03-23 ASSESSMENT — ENCOUNTER SYMPTOMS
DIZZINESS: 0
WEIGHT LOSS: 0
SHORTNESS OF BREATH: 0
EYE DISCHARGE: 0
EYE PAIN: 0
BRUISES/BLEEDS EASILY: 0
BLOOD IN STOOL: 0
TINGLING: 0
HEMOPTYSIS: 0
DOUBLE VISION: 0
MYALGIAS: 0
SINUS PAIN: 0
WHEEZING: 0
CHILLS: 0
LOSS OF CONSCIOUSNESS: 0
HALLUCINATIONS: 0
FALLS: 0
VOMITING: 0
COUGH: 0
SORE THROAT: 0
DIARRHEA: 0
SPUTUM PRODUCTION: 0
ORTHOPNEA: 0
FEVER: 0
NAUSEA: 0
POLYDIPSIA: 0
FLANK PAIN: 0
HEADACHES: 0
ABDOMINAL PAIN: 0
FOCAL WEAKNESS: 0
WEAKNESS: 0
BLURRED VISION: 0
MEMORY LOSS: 0
NERVOUS/ANXIOUS: 0
PALPITATIONS: 0

## 2020-03-23 ASSESSMENT — LIFESTYLE VARIABLES
ANXIETY: *
SUBSTANCE_ABUSE: 1
TREMOR: NO TREMOR
TOTAL SCORE: 2
ORIENTATION AND CLOUDING OF SENSORIUM: ORIENTED AND CAN DO SERIAL ADDITIONS
HEADACHE, FULLNESS IN HEAD: NOT PRESENT
ANXIETY: *
ORIENTATION AND CLOUDING OF SENSORIUM: ORIENTED AND CAN DO SERIAL ADDITIONS
TREMOR: NO TREMOR
NAUSEA AND VOMITING: NO NAUSEA AND NO VOMITING
ANXIETY: MILDLY ANXIOUS
PAROXYSMAL SWEATS: NO SWEAT VISIBLE
AGITATION: NORMAL ACTIVITY
TOTAL SCORE: 3
AUDITORY DISTURBANCES: NOT PRESENT
VISUAL DISTURBANCES: NOT PRESENT
AGITATION: NORMAL ACTIVITY
PAROXYSMAL SWEATS: NO SWEAT VISIBLE
HEADACHE, FULLNESS IN HEAD: VERY MILD
ORIENTATION AND CLOUDING OF SENSORIUM: ORIENTED AND CAN DO SERIAL ADDITIONS
AUDITORY DISTURBANCES: NOT PRESENT
AUDITORY DISTURBANCES: NOT PRESENT
TOTAL SCORE: 2
TREMOR: NO TREMOR
NAUSEA AND VOMITING: NO NAUSEA AND NO VOMITING
PAROXYSMAL SWEATS: NO SWEAT VISIBLE
NAUSEA AND VOMITING: NO NAUSEA AND NO VOMITING
AGITATION: NORMAL ACTIVITY
HEADACHE, FULLNESS IN HEAD: VERY MILD

## 2020-03-23 ASSESSMENT — PATIENT HEALTH QUESTIONNAIRE - PHQ9
1. LITTLE INTEREST OR PLEASURE IN DOING THINGS: NOT AT ALL
SUM OF ALL RESPONSES TO PHQ9 QUESTIONS 1 AND 2: 0
2. FEELING DOWN, DEPRESSED, IRRITABLE, OR HOPELESS: NOT AT ALL

## 2020-03-23 ASSESSMENT — COGNITIVE AND FUNCTIONAL STATUS - GENERAL
CLIMB 3 TO 5 STEPS WITH RAILING: A LITTLE
WALKING IN HOSPITAL ROOM: A LITTLE
SUGGESTED CMS G CODE MODIFIER DAILY ACTIVITY: CH
MOBILITY SCORE: 22
SUGGESTED CMS G CODE MODIFIER MOBILITY: CJ
DAILY ACTIVITIY SCORE: 24

## 2020-03-23 NOTE — ASSESSMENT & PLAN NOTE
Type A: impaired systemic perfusion shock, severe hypoxemia, anemia, cyanide  Type B1: impaired clearence (liver, renal) or increase production (seizure, hypothermia, exercise, ischemic colitis)  Type B2: medication (INH, metformin, linezolid, HIV meds)  Type B3: inborn errors or metabolism    Thiamine def, albuterol/epinephrine    This is not sepsis this is related to chronic liver dysfunction, starvation ketosis   High dose thiamine 250mg IV Q8 x3 days  Dextrose and IV fluids    I would not even repeat

## 2020-03-23 NOTE — ED NOTES
Received report on patient. Patient is resting in bed, has minor complaints of IV pain, no signs of infiltration or phlebitis, and no indications of distress. No other needs at this time.

## 2020-03-23 NOTE — DISCHARGE SUMMARY
Internal Medicine Discharge Summary  Note Author: Donnell Daniel M.D.       Name Zari Day 1965   Age/Sex 54 y.o. female   MRN [id number]         Admit Date:  3/22/2020       Discharge Date:   3/23/2020    Service:   [facility] Internal Medicine Green Team  Attending Physician(s):   Dr Caraballo       Senior Resident(s):   Dr Daniel  [place] Resident(s):   Dr Downing  PCP: Shorty Ibarra M.D.      Primary Diagnosis:   Lactic Acidosis     Secondary Diagnoses:                Principal Problem (Resolved):     Alcohol intoxication (HCC) POA: Unknown      Overview: - Paitetn with history of alcohol use disorder.  Active Problems:    Seizure cerebral POA: Yes    Vitamin deficiency POA: Yes    Thrombocytopenia (HCC) POA: Yes  Resolved Problems:    Hypokalemia     Nausea POA: Yes    Lactic acid acidosis POA: Unknown       Hospital Summary (Brief Narrative):       A 54 y.o. female with PMHx of TBI,  Seizure disorder related to TBI, alcohol use disorder associated with past h/o withdrawal seizures (prior note in November and 2017), chronic leukopenia and thrombocytopenia likely secondary to alcohol use came in with history of fall 2 days ago and unusual behavior per the patient's . she slipped on ice 2 days ago and hit her head and Left elbow.  Per the patient's  she has been acting strange.  The patient reports of drinking vodka daily, last drink was at 6 AM on the day of admission).  She c/o  nausea and vomiting, decreased appetite.   At ER, found to have elevated lactic acid of 5.7 & hypo K. CT head & CT C spine: Nil acute. Neurology was consulted & recommended to stop home zonisamide and started on Keppra 500 mg BID. Pt's lactic acidosis improved with IVF. K was repleted. Nausea improved & tolerating diet.  She was started on CIWA protocol on admit and her CIWA scores has been low. She has been going to group meetings to help her with alcohol cessation . Discussed with case Mx to provide her  with community resources for alcohol cessation.  Pt was d/c home in stable conditions with Keppra 500 mg BID, follow up with PCP & Neurology outpatient.       Patient /Hospital Summary (Details -- Problem Oriented) :   Alcohol intoxication (HCC)  Assessment & Plan  -History of alcohol Abuse  - > 4 vodka drinks/day  -  -AST:ALT 2:1  -Elevated Alkaline phosphatase  -Associated with lactic acidosis  -associated N/V improved. Tolerating PO diet  -No hx of severe ETOH withdrawals per patient    -Currently low CIWA scores  -thrombocytopenia, neutropenia, AST/ALT, T Dany consistent with significant EtOH use   Plan:  -Multivitamins  -Thiamine and Folic acid  -patient was provided with Community resources for substance use disorders        Hypokalemia  Assessment & Plan  3.2 in the ED  GiveN 40 mEQ and 20mEq in LR  Plan:  -resolved     Seizure (HCC)  Assessment & Plan  Unknown Etiology  H/o TBI  H/o admission secondary to alcohol abuse twice in 2017  Assessed by neurology in ER  Discontinued zonisamide and started on Keppra  Plan:  -Continue Keppra per neurology  -Routine EEG  -Keep Mag>2  -patient to schedule two week f/u with neurology for recent medication adjustment.      Lactic acid acidosis  Assessment & Plan  Type B  Lactic Acidosis  6.5 --> 5.7 --> 5.4 -> 1.9  Likely secondary to alcohol abuse and poor PO intake  Plan:  -Improved, tolerating PO intake        Consultants:     Neurology     Procedures:        [facility] protocol   EKG     Imaging/ Testing:      CT head   CT C spine     Discharge Medications:         Medication Reconciliation: Completed       Medication List      START taking these medications      Instructions   folic acid 1 MG Tabs  Start taking on:  March 24, 2020  Commonly known as:  FOLVITE   Take 1 Tab by mouth every day.  Dose:  1 mg     levETIRAcetam 500 MG Tabs  Commonly known as:  KEPPRA   Take 1 Tab by mouth 2 times a day.  Dose:  500 mg     multivitamin Tabs  Start taking on:  March 24,  2020   Take 1 Tab by mouth every day.  Dose:  1 Tab     thiamine 100 MG tablet  Commonly known as:  THIAMINE   Take 1 Tab by mouth every day.  Dose:  100 mg        CONTINUE taking these medications      Instructions   gabapentin 300 MG Caps  Commonly known as:  NEURONTIN   Take 300 mg by mouth as needed (pain).  Dose:  300 mg        STOP taking these medications    zonisamide 100 MG Caps  Commonly known as:  ZONEGRAN          Disposition:   Home     Diet:   Healthy     Activity:   As Tolerated     Instructions:      Follow up with PCP & Neurology.   The patient was instructed to return to the ER in the event of worsening symptoms. I have counseled the patient on the importance of compliance and the patient has agreed to proceed with all medical recommendations and follow up plan indicated above.   The patient understands that all medications come with benefits and risks. Risks may include permanent injury or death and these risks can be minimized with close reassessment and monitoring.        Primary Care Provider:    Shorty Ibarra M.D.  Discharge summary faxed to primary care provider:  Completed  Copy of discharge summary given to the patient: Completed      Follow up appointment details :      Future Appointments   Date Time Provider Department Center   3/31/2020 11:40 AM SANIA [place]   4/20/2020  4:00 PM Zander Okeefe M.D. [facility] Neurology  [address], [address]  [facility] [address] [phone]  In 2 weeks  medication change follow up     Shorty Ibarra M.D.  [address]  [facility] [address]  [phone]    In 1 week  For CBC and CMP lab check       Pending Studies:        None     Time spent on discharge day patient visit, preparing discharge paperwork and arranging for patient follow up.    Discharge Time (Minutes) :    60 mins   Hospital Course Type: Inpatient Stay < 2 midnights, patient recovered more rapidly than  anticipated      Condition on Discharge    ______________________________________________________________________    Interval history/exam for day of discharge:    Subjective:   No acute events overnight. Patient states that she is felling much better this morning. Denies abd pain, N/V & tolerating PO intake.       Consultants/Specialty:  Neurology  Critical Care     Review of Systems:    Review of Systems   Constitutional: Negative for chills, fever, malaise/fatigue and weight loss.   HENT: Negative for congestion, ear discharge, sinus pain and sore throat.    Eyes: Negative for blurred vision, double vision, pain and discharge.   Respiratory: Negative for cough, hemoptysis, sputum production, shortness of breath and wheezing.    Cardiovascular: Negative for chest pain, palpitations, orthopnea and leg swelling.   Gastrointestinal: Negative for abdominal pain, blood in stool, diarrhea, melena, nausea and vomiting.   Genitourinary: Negative for dysuria, flank pain, frequency and hematuria.   Musculoskeletal: Negative for falls and myalgias.   Skin: Negative for rash.   Neurological: Negative for dizziness, tingling, focal weakness, loss of consciousness, weakness and headaches.   Endo/Heme/Allergies: Negative for polydipsia. Does not bruise/bleed easily.   Psychiatric/Behavioral: Positive for substance abuse. Negative for hallucinations, memory loss and suicidal ideas. The patient is not nervous/anxious.          Objective Data:   Physical Exam:   Vitals:   Temp:  [36.4 °C (97.6 °F)-37.4 °C (99.4 °F)] 36.7 °C (98 °F)  Pulse:  [] 101  Resp:  [15-25] 18  BP: (121-155)/(78-97) 123/78  SpO2:  [92 %-98 %] 98 %     Physical Exam  Vitals signs and nursing note reviewed.   Constitutional:       General: She is not in acute distress.     Appearance: Normal appearance. She is not ill-appearing or diaphoretic.   HENT:      Head: Normocephalic and atraumatic.      Nose: Nose normal. No rhinorrhea.      Mouth/Throat:       Mouth: Mucous membranes are moist.      Pharynx: No oropharyngeal exudate or posterior oropharyngeal erythema.   Eyes:      Extraocular Movements: Extraocular movements intact.      Conjunctiva/sclera: Conjunctivae normal.      Pupils: Pupils are equal, round, and reactive to light.   Neck:      Musculoskeletal: Normal range of motion and neck supple.   Cardiovascular:      Rate and Rhythm: Normal rate and regular rhythm.      Pulses: Normal pulses.      Heart sounds: No murmur.   Pulmonary:      Effort: Pulmonary effort is normal. No respiratory distress.      Breath sounds: Normal breath sounds. No wheezing, rhonchi or rales.   Abdominal:      General: Abdomen is flat. Bowel sounds are normal. There is no distension.      Palpations: Abdomen is soft. There is no mass.      Tenderness: There is no abdominal tenderness. There is no guarding.   Musculoskeletal: Normal range of motion.         General: No swelling or tenderness.   Skin:     General: Skin is warm and dry.   Neurological:      General: No focal deficit present.      Mental Status: She is alert and oriented to person, place, and time.      Cranial Nerves: No cranial nerve deficit.      Sensory: No sensory deficit.      Motor: No weakness.   Psychiatric:         Mood and Affect: Mood normal.         Behavior: Behavior normal.         Thought Content: Thought content normal.      Most Recent Labs:    Lab Results   Component Value Date/Time    WBC 1.8 (LL) 03/23/2020 12:47 AM    RBC 3.36 (L) 03/23/2020 12:47 AM    HEMOGLOBIN 11.0 (L) 03/23/2020 12:47 AM    HEMATOCRIT 34.0 (L) 03/23/2020 12:47 AM    .2 (H) 03/23/2020 12:47 AM    MCH 32.7 03/23/2020 12:47 AM    MCHC 32.4 (L) 03/23/2020 12:47 AM    MPV 9.9 03/23/2020 12:47 AM    NEUTSPOLYS 21.80 (L) 03/23/2020 12:47 AM    LYMPHOCYTES 56.40 (H) 03/23/2020 12:47 AM    MONOCYTES 17.90 (H) 03/23/2020 12:47 AM    EOSINOPHILS 1.70 03/23/2020 12:47 AM    BASOPHILS 2.20 (H) 03/23/2020 12:47 AM    ANISOCYTOSIS  1+ 11/12/2017 03:47 PM      Lab Results   Component Value Date/Time    SODIUM 144 03/23/2020 12:47 AM    POTASSIUM 3.9 03/23/2020 12:47 AM    CHLORIDE 110 03/23/2020 12:47 AM    CO2 20 03/23/2020 12:47 AM    GLUCOSE 65 03/23/2020 12:47 AM    BUN 6 (L) 03/23/2020 12:47 AM    CREATININE 0.49 (L) 03/23/2020 12:47 AM    CREATININE 0.9 10/10/2006 07:30 PM      Lab Results   Component Value Date/Time    ALTSGPT 32 03/23/2020 12:47 AM    ASTSGOT 73 (H) 03/23/2020 12:47 AM    ALKPHOSPHAT 107 (H) 03/23/2020 12:47 AM    TBILIRUBIN 1.6 (H) 03/23/2020 12:47 AM    LIPASE 26 03/22/2020 12:27 PM    ALBUMIN 3.5 03/23/2020 12:47 AM    GLOBULIN 2.0 03/23/2020 12:47 AM    INR 1.19 (H) 03/22/2020 12:27 PM    MACROCYTOSIS 1+ 11/12/2017 03:47 PM     Lab Results   Component Value Date/Time    PROTHROMBTM 15.4 (H) 03/22/2020 12:27 PM    INR 1.19 (H) 03/22/2020 12:27 PM

## 2020-03-23 NOTE — CONSULTS
Critical Care Consultation    Date of consult: 3/22/2020    Referring Physician  Ken Tenorio M.D.    Reason for Consultation  Elevated lactate    History of Presenting Illness  54 y.o. female who presented 3/22/2020 with hx of seizure do to TBI, Alcohol abuse, ? Hx of lupus and chronic vomiting. She describes that she had her typical episode of vomiting per her that happens once a month. She was vomiting for 2 days and then has been finally able to keep down some chicken broth. She izaiah abdominal pain, vomiting blood or coffee color, izaiah prior GI bleed. Izaiah sick contacts. She did fall 2 days ago. Izaiah dysuria, flank pain, cough or shortness of breath. Feels anxious she doesn't like hospitals and doctors. She was brought in by her  to get evaluated for the fall. She was place on legal hold in ER and had labs and Ct scan of the head and labs were also preformed. She has been afebrile,  Hr 's, -130's on room air. Labs show her chronic leukopenia 1.9 hg 13, plt 67, chem Na 149, K 3.2, co2 20, BUN 8, cr 0.86, AG 20, t bili 2.3 ammonia 36, alcohol level 357, and uds + THC which she describes she does use. Ct head atrophy, Ct C spine with djd, CXR was normal, u/a negative. Lactate 6.5.     Code Status  Full Code    Review of Systems  Review of Systems   Constitutional: Positive for weight loss. Negative for chills, fever and malaise/fatigue.   HENT: Negative for congestion, ear discharge, sinus pain and sore throat.    Eyes: Negative for double vision and discharge.   Respiratory: Negative for cough, hemoptysis, sputum production, shortness of breath and wheezing.    Cardiovascular: Negative for chest pain, claudication and leg swelling.   Gastrointestinal: Positive for nausea and vomiting. Negative for abdominal pain, blood in stool, constipation, diarrhea, heartburn and melena.   Genitourinary: Negative for dysuria, frequency, hematuria and urgency.   Musculoskeletal: Negative for  back pain, joint pain and myalgias.   Neurological: Negative for dizziness, tingling, tremors, sensory change, speech change, focal weakness, seizures, weakness and headaches.   Endo/Heme/Allergies: Negative for environmental allergies. Does not bruise/bleed easily.   Psychiatric/Behavioral: Positive for substance abuse. Negative for depression, hallucinations, memory loss and suicidal ideas. The patient is nervous/anxious. The patient does not have insomnia.        Past Medical History   has a past medical history of Alcoholic hepatitis, Sebaceous cyst (9/12/2018), Seizure cerebral (6/22/2018), and Thrombocytopenia (HCC).    Surgical History   has a past surgical history that includes bone marrow aspiration (Left, 3/7/2019) and bone marrow biopsy, ndl/trocar (Left, 3/7/2019).    Family History  family history includes Alcohol/Drug in her father and mother; Autoimmune Disease in her daughter and son; Breast Cancer in her mother; Hypertension in her father and mother.    Social History   reports that she has quit smoking. She quit after 5.00 years of use. She has never used smokeless tobacco. She reports current drug use. Drug: Marijuana. She reports that she does not drink alcohol.    Medications  Home Medications     Reviewed by Renae Agarwal, Pharmacy Intern (Pharmacy Intern) on 03/22/20 at 1408  Med List Status: Complete   Medication Last Dose Status   gabapentin (NEURONTIN) 300 MG Cap prn Active   zonisamide (ZONEGRAN) 100 MG Cap 3/21/2020 Active              Current Facility-Administered Medications   Medication Dose Route Frequency Provider Last Rate Last Dose   • potassium chloride 20 mEq in LR 1,000 mL infusion   Intravenous Corina Benitez M.D. 125 mL/hr at 03/22/20 1650     • senna-docusate (PERICOLACE or SENOKOT S) 8.6-50 MG per tablet 2 Tab  2 Tab Oral BID Annmarie Groves M.D.   2 Tab at 03/22/20 1827    And   • polyethylene glycol/lytes (MIRALAX) PACKET 1 Packet  1 Packet Oral QDAY PRN  Annmarie Groves M.D.        And   • magnesium hydroxide (MILK OF MAGNESIA) suspension 30 mL  30 mL Oral QDAY PRN Annmarie Groves M.D.        And   • bisacodyl (DULCOLAX) suppository 10 mg  10 mg Rectal QDAY PRN Annmarie Groves M.D.       • acetaminophen (TYLENOL) tablet 650 mg  650 mg Oral Q6HRS PRN Annmarie Groves M.D.       • labetalol (NORMODYNE/TRANDATE) injection 10 mg  10 mg Intravenous Q HOUR PRN Annmarie Groves M.D.        Or   • labetalol (NORMODYNE) tablet 200 mg  200 mg Oral Q6HRS PRN Annmarie Groves M.D.       • LORazepam (ATIVAN) tablet 0.5 mg  0.5 mg Oral Q4HRS PRN Annmarie Groves M.D.       • LORazepam (ATIVAN) tablet 1 mg  1 mg Oral Q4HRS PRN Annmarie Groves M.D.        Or   • LORazepam (ATIVAN) injection 0.5 mg  0.5 mg Intravenous Q4HRS PRN Annmarie Groves M.D.       • LORazepam (ATIVAN) tablet 2 mg  2 mg Oral Q2HRS PRN Annmarie Groves M.D.        Or   • LORazepam (ATIVAN) injection 1 mg  1 mg Intravenous Q2HRS PRN Annmarie Groves M.D.       • LORazepam (ATIVAN) tablet 3 mg  3 mg Oral Q HOUR PRN Annmarie Groves M.D.        Or   • LORazepam (ATIVAN) injection 1.5 mg  1.5 mg Intravenous Q HOUR PRN Annmarie Groves M.D.       • LORazepam (ATIVAN) tablet 4 mg  4 mg Oral Q15 MIN PRN Annmarie Groves M.D.        Or   • LORazepam (ATIVAN) injection 2 mg  2 mg Intravenous Q15 MIN PRN Annmarie Groves M.D.       • chlordiazePOXIDE (LIBRIUM) capsule 50 mg  50 mg Oral Q6HRS Annmarie Groves M.D.   50 mg at 03/22/20 1827    Followed by   • [START ON 3/23/2020] chlordiazePOXIDE (LIBRIUM) capsule 25 mg  25 mg Oral Q6HRS Annmarie Groves M.D.       • magnesium sulfate IVPB premix 2 g  2 g Intravenous Once Annmarie Groves M.D. 25 mL/hr at 03/22/20 1834 2 g at 03/22/20 1834    And   • [START ON 3/23/2020] magnesium oxide (MAG-OX) tablet 400 mg  400 mg Oral BID Annmarie Groves M.D.       • gabapentin (NEURONTIN) capsule 300 mg  300 mg Oral QHS PRN Annmarie Groves M.D.       • [START ON 3/23/2020] levETIRAcetam (KEPPRA)  500 mg in  mL IVPB  500 mg Intravenous Q12HRS Veronika Benitez M.D.       • thiamine (B-1) 100 mg in D5W 250 mL IVPB  100 mg Intravenous Q8HRS Eleazar Carter M.D.         Current Outpatient Medications   Medication Sig Dispense Refill   • gabapentin (NEURONTIN) 300 MG Cap Take 300 mg by mouth as needed (pain).     • zonisamide (ZONEGRAN) 100 MG Cap Take 1 Cap by mouth every bedtime. 30 Cap 11       Allergies  No Known Allergies    Vital Signs last 24 hours  Temp:  [36.6 °C (97.8 °F)] 36.6 °C (97.8 °F)  Pulse:  [] 114  Resp:  [15-25] 20  BP: (125-155)/(81-97) 138/90  SpO2:  [92 %-95 %] 95 %    Physical Exam  Physical Exam  Constitutional:       General: She is not in acute distress.     Appearance: She is not ill-appearing.      Comments: Anxious female when I enter the room not ill appearing smiling joking with me   HENT:      Head: Normocephalic.      Right Ear: There is no impacted cerumen.      Nose: No congestion.      Mouth/Throat:      Mouth: Mucous membranes are dry.      Pharynx: Posterior oropharyngeal erythema present. No oropharyngeal exudate.   Eyes:      Pupils: Pupils are equal, round, and reactive to light.   Neck:      Musculoskeletal: No neck rigidity or muscular tenderness.   Cardiovascular:      Rate and Rhythm: Tachycardia present.      Heart sounds: No murmur. No friction rub.   Pulmonary:      Effort: No respiratory distress.      Breath sounds: No stridor. No wheezing or rhonchi.   Abdominal:      General: There is no distension.      Palpations: There is no mass.      Tenderness: There is no abdominal tenderness. There is no guarding or rebound.      Hernia: No hernia is present.   Musculoskeletal:         General: No swelling, tenderness, deformity or signs of injury.   Skin:     Coloration: Skin is pale. Skin is not jaundiced.      Findings: No bruising.   Neurological:      General: No focal deficit present.      Mental Status: She is alert and oriented to person, place,  and time.      Cranial Nerves: No cranial nerve deficit.      Sensory: No sensory deficit.      Motor: No weakness.      Coordination: Coordination normal.      Gait: Gait normal.      Deep Tendon Reflexes: Reflexes normal.   Psychiatric:      Comments: Odd mood and anxious         Fluids    Intake/Output Summary (Last 24 hours) at 3/22/2020 2015  Last data filed at 3/22/2020 1715  Gross per 24 hour   Intake 2100 ml   Output --   Net 2100 ml       Laboratory  Recent Results (from the past 48 hour(s))   EKG (NOW)    Collection Time: 20 11:30 AM   Result Value Ref Range    Report       [facility]    Test Date:  2020  Pt Name:    GUMARO ALLEN                  Department: ER  MRN:        [id number]                      Room:       [facility]  Gender:     Female                       Technician: [id number]  :        [id number]                   Requested By:NOEMI CALLOWAY  Order #:    [id number]                    Reading MD: Noemi Calloway    Measurements  Intervals                                Axis  Rate:       123                          P:          53  RI:         144                          QRS:        61  QRSD:       86                           T:          -66  QT:         284  QTc:        407    Interpretive Statements  SINUS TACHYCARDIA rate 123  Normal axis  Normal intervals  No ST elevation  Minimal ST depression V3-V6  BORDERLINE LOW VOLTAGE IN FRONTAL LEADS  BORDERLINE T ABNORMALITIES, INFERIOR LEADS  Compared to ECG 2017 05:12:52  T-wave abnormality now present  Sinus rhythm no longer present  Electronically Signed On 3-22 -2020 14:04:21 PDT by Noemi Calloway     DIAGNOSTIC ALCOHOL    Collection Time: 20 12:27 PM   Result Value Ref Range    Diagnostic Alcohol 357.4 (H) 0.0 - 10.1 mg/dL   CBC WITH DIFFERENTIAL    Collection Time: 20 12:27 PM   Result Value Ref Range    WBC 1.9 (LL) 4.8 - 10.8 K/uL    RBC 4.06 (L) 4.20 - 5.40 M/uL    Hemoglobin 13.5 12.0 - 16.0  g/dL    Hematocrit 40.3 37.0 - 47.0 %    MCV 99.3 (H) 81.4 - 97.8 fL    MCH 33.3 (H) 27.0 - 33.0 pg    MCHC 33.5 (L) 33.6 - 35.0 g/dL    RDW 53.0 (H) 35.9 - 50.0 fL    Platelet Count 67 (L) 164 - 446 K/uL    MPV 9.3 9.0 - 12.9 fL    Neutrophils-Polys 33.80 (L) 44.00 - 72.00 %    Lymphocytes 43.80 (H) 22.00 - 41.00 %    Monocytes 14.10 (H) 0.00 - 13.40 %    Eosinophils 2.60 0.00 - 6.90 %    Basophils 4.70 (H) 0.00 - 1.80 %    Immature Granulocytes 1.00 (H) 0.00 - 0.90 %    Nucleated RBC 0.00 /100 WBC    Neutrophils (Absolute) 0.65 (L) 2.00 - 7.15 K/uL    Lymphs (Absolute) 0.84 (L) 1.00 - 4.80 K/uL    Monos (Absolute) 0.27 0.00 - 0.85 K/uL    Eos (Absolute) 0.05 0.00 - 0.51 K/uL    Baso (Absolute) 0.09 0.00 - 0.12 K/uL    Immature Granulocytes (abs) 0.02 0.00 - 0.11 K/uL    NRBC (Absolute) 0.00 K/uL   COMP METABOLIC PANEL    Collection Time: 03/22/20 12:27 PM   Result Value Ref Range    Sodium 149 (H) 135 - 145 mmol/L    Potassium 3.2 (L) 3.6 - 5.5 mmol/L    Chloride 109 96 - 112 mmol/L    Co2 20 20 - 33 mmol/L    Anion Gap 20.0 (H) 7.0 - 16.0    Glucose 86 65 - 99 mg/dL    Bun 8 8 - 22 mg/dL    Creatinine 0.78 0.50 - 1.40 mg/dL    Calcium 9.2 8.5 - 10.5 mg/dL    AST(SGOT) 104 (H) 12 - 45 U/L    ALT(SGPT) 41 2 - 50 U/L    Alkaline Phosphatase 134 (H) 30 - 99 U/L    Total Bilirubin 2.3 (H) 0.1 - 1.5 mg/dL    Albumin 4.2 3.2 - 4.9 g/dL    Total Protein 6.6 6.0 - 8.2 g/dL    Globulin 2.4 1.9 - 3.5 g/dL    A-G Ratio 1.8 g/dL   LIPASE    Collection Time: 03/22/20 12:27 PM   Result Value Ref Range    Lipase 26 11 - 82 U/L   TROPONIN    Collection Time: 03/22/20 12:27 PM   Result Value Ref Range    Troponin T 11 6 - 19 ng/L   CREATINE KINASE    Collection Time: 03/22/20 12:27 PM   Result Value Ref Range    CPK Total 147 0 - 154 U/L   LACTIC ACID    Collection Time: 03/22/20 12:27 PM   Result Value Ref Range    Lactic Acid 6.5 (HH) 0.5 - 2.0 mmol/L   PROTHROMBIN TIME (INR)    Collection Time: 03/22/20 12:27 PM   Result Value  Ref Range    PT 15.4 (H) 12.0 - 14.6 sec    INR 1.19 (H) 0.87 - 1.13   APTT    Collection Time: 03/22/20 12:27 PM   Result Value Ref Range    APTT 33.5 24.7 - 36.0 sec   ESTIMATED GFR    Collection Time: 03/22/20 12:27 PM   Result Value Ref Range    GFR If African American >60 >60 mL/min/1.73 m 2    GFR If Non African American >60 >60 mL/min/1.73 m 2   AMMONIA    Collection Time: 03/22/20 12:40 PM   Result Value Ref Range    Ammonia 36 11 - 45 umol/L   MAGNESIUM    Collection Time: 03/22/20 12:40 PM   Result Value Ref Range    Magnesium 1.9 1.5 - 2.5 mg/dL   LACTIC ACID    Collection Time: 03/22/20  3:12 PM   Result Value Ref Range    Lactic Acid 5.7 (HH) 0.5 - 2.0 mmol/L   URINALYSIS CULTURE, IF INDICATED    Collection Time: 03/22/20  3:13 PM   Result Value Ref Range    Color DK Yellow     Character Clear     Specific Gravity 1.012 <1.035    Ph 5.5 5.0 - 8.0    Glucose Negative Negative mg/dL    Ketones Negative Negative mg/dL    Protein 30 (A) Negative mg/dL    Bilirubin Negative Negative    Urobilinogen, Urine 1.0 Negative    Nitrite Negative Negative    Leukocyte Esterase Negative Negative    Occult Blood Negative Negative    Micro Urine Req Microscopic    URINE DRUG SCREEN    Collection Time: 03/22/20  3:13 PM   Result Value Ref Range    Amphetamines Urine Negative Negative    Barbiturates Negative Negative    Benzodiazepines Negative Negative    Cocaine Metabolite Negative Negative    Methadone Negative Negative    Opiates Negative Negative    Oxycodone Negative Negative    Phencyclidine -Pcp Negative Negative    Propoxyphene Negative Negative    Cannabinoid Metab Positive (A) Negative   URINE MICROSCOPIC (W/UA)    Collection Time: 03/22/20  3:13 PM   Result Value Ref Range    WBC 0-2 /hpf    RBC 2-5 (A) /hpf    Bacteria Negative None /hpf    Epithelial Cells Negative /hpf    Hyaline Cast 3-5 (A) /lpf   LACTIC ACID    Collection Time: 03/22/20  5:30 PM   Result Value Ref Range    Lactic Acid 5.4 (HH) 0.5 - 2.0  "mmol/L       Imaging  CT-HEAD W/O   Final Result      1.  No evidence of acute intracranial process.      2.  Atrophy.      CT-CSPINE WITHOUT PLUS RECONS   Final Result      1.  No evidence of cervical spine fracture.      2.  Multilevel degenerative disc disease.      DX-CHEST-PORTABLE (1 VIEW)   Final Result      No evidence of acute cardiopulmonary process.      DX-ELBOW-COMPLETE 3+ LEFT   Final Result      No evidence of acute fracture or dislocation.          Assessment/Plan  Lactate blood increase  Assessment & Plan  Type A: impaired systemic perfusion shock, severe hypoxemia, anemia, cyanide  Type B1: impaired clearence (liver, renal) or increase production (seizure, hypothermia, exercise, ischemic colitis)  Type B2: medication (INH, metformin, linezolid, HIV meds)  Type B3: inborn errors or metabolism    Thiamine def, albuterol/epinephrine    This is not sepsis this is related to chronic liver dysfunction, starvation ketosis   High dose thiamine 250mg IV Q8 x3 days  Dextrose and IV fluids    I would not even repeat         Alcohol intoxication (HCC)  Assessment & Plan  CIWA/RASS protocol with serial neuro exams  MVI, folate, thiamine aka \"ralley bag\"  Aggressive replace magnesium, potassium, phosphorus  Watch for starvation/alcoholic ketoacidosis and refeeding syndrome  Hypertension, tachycardia, SZ and prior DT's greatest risk factors for progression into Delirium Tremors    Check Maddery's discriminant function  R/o thyroid, sepsis, polysubstance abuse    Monitor closely patient at high risk for needing ICU admission for development of severe alcohol withdrawal.     Nausea- (present on admission)  Assessment & Plan  Chronic monthly, EGD as outpatient  Anti-emetic monitor QT interval  Pepcid for alcohol gastritis    Thrombocytopenia (HCC)- (present on admission)  Assessment & Plan  Serial monitor for bleeding, chronic related to alcohol abuse    Vitamin deficiency- (present on admission)  Assessment & " Plan  High dose thiamine and MVI    Seizure cerebral- (present on admission)  Assessment & Plan  Hx of seizure precautions  Avoid seizure lowering drugs  keppra gabapentin      Discussed patient condition and risk of morbidity and/or mortality with RN, [employer] [facility] resident, Charge nurse / hot rounds and Patient.      Davey Spangler MD  Critical Care Medicine    During current COVID crisis we are limiting patient for ICU admission. Patient biggest risk is not sepsis for ICU admission is related to potential severe alcohol withdrawal please manage aggressively and call ICU if patient is not responding to therapy.

## 2020-03-23 NOTE — ED NOTES
Pt ambulated to restroom, standby assist, steady gait. Pt requesting for her phone, notified her that I have to speak with the physician to clarify her legal hold status. Paged the on-call physician and spoke with Dr. Nation. Per Dr. Nation pt is not on a legal hold because she is intoxicated but is an elopement risk and not able to leave at this time.

## 2020-03-23 NOTE — PROGRESS NOTES
Report received at bedside from NOC RN, pt care assumed, tele box on. Pt aaox4, on room air, no signs of distress noted at this time. Patient resting comfortably in bed. POC discussed with pt and verbalizes no questions. Pt denies any additional needs at this time. Bed in lowest position, call light within reach.

## 2020-03-23 NOTE — ASSESSMENT & PLAN NOTE
-History of alcohol Abuse  - > 4 vodka drinks/day  -  -AST:ALT 2:1  -Elevated Alkaline phosphatase  -Associated with lactic acidosis  -associated N/V improved. Tolerating PO diet  -No hx of severe ETOH withdrawals per patient    -Currently low CIWA scores  -thrombocytopenia, neutropenia, AST/ALT, T Dany consistent with significant EtOH use   Plan:  -Multivitamins  -Thiamine and Folic acid  -patient was provided with Community resources for substance use disorders

## 2020-03-23 NOTE — ASSESSMENT & PLAN NOTE
"CIWA/RASS protocol with serial neuro exams  MVI, folate, thiamine aka \"ralley bag\"  Aggressive replace magnesium, potassium, phosphorus  Watch for starvation/alcoholic ketoacidosis and refeeding syndrome  Hypertension, tachycardia, SZ and prior DT's greatest risk factors for progression into Delirium Tremors    Check Stacia's discriminant function  R/o thyroid, sepsis, polysubstance abuse    Monitor closely patient at high risk for needing ICU admission for development of severe alcohol withdrawal.   "

## 2020-03-23 NOTE — PROGRESS NOTES
Arlen from Lab called with critical result of WBC of 1.8 and absolute neutrophil of 0.39 at 0115. Critical lab result read back to Arlen.   Dr. Purdy notified of critical lab result at 0130.  Critical lab result read back by Dr. Purdy.

## 2020-03-23 NOTE — PROGRESS NOTES
Daily Progress Note:     Date of Service: 3/23/2020  Primary Team: [facility] IM Green Team   Attending: Dr. Oscar Caraballo  Senior Resident: Dr. Daniel  Intern: Dr. Downing  Contact:  [phone]    Chief Complaint:   Hypovolemia secondary to Emesis with associated lactic acidosis    Subjective:   No acute events overnight. Patient states that she is felling much better this morning. Denies abd pain, N/V. She is tolerating PO intake. She is interested in abstaining from alcohol and case management has provided patient with community resources for her during this time. Patient asking about going home this AM.     Consultants/Specialty:  Neurology  Critical Care    Review of Systems:    Review of Systems   Constitutional: Negative for chills, fever, malaise/fatigue and weight loss.   HENT: Negative for congestion, ear discharge, sinus pain and sore throat.    Eyes: Negative for blurred vision, double vision, pain and discharge.   Respiratory: Negative for cough, hemoptysis, sputum production, shortness of breath and wheezing.    Cardiovascular: Negative for chest pain, palpitations, orthopnea and leg swelling.   Gastrointestinal: Negative for abdominal pain, blood in stool, diarrhea, melena, nausea and vomiting.   Genitourinary: Negative for dysuria, flank pain, frequency and hematuria.   Musculoskeletal: Negative for falls and myalgias.   Skin: Negative for rash.   Neurological: Negative for dizziness, tingling, focal weakness, loss of consciousness, weakness and headaches.   Endo/Heme/Allergies: Negative for polydipsia. Does not bruise/bleed easily.   Psychiatric/Behavioral: Positive for substance abuse. Negative for hallucinations, memory loss and suicidal ideas. The patient is not nervous/anxious.        Objective Data:   Physical Exam:   Vitals:   Temp:  [36.4 °C (97.6 °F)-37.4 °C (99.4 °F)] 36.7 °C (98 °F)  Pulse:  [] 101  Resp:  [15-25] 18  BP: (121-155)/(78-97) 123/78  SpO2:  [92 %-98 %] 98 %     Physical  Exam  Vitals signs and nursing note reviewed.   Constitutional:       General: She is not in acute distress.     Appearance: Normal appearance. She is not ill-appearing or diaphoretic.   HENT:      Head: Normocephalic and atraumatic.      Nose: Nose normal. No rhinorrhea.      Mouth/Throat:      Mouth: Mucous membranes are moist.      Pharynx: No oropharyngeal exudate or posterior oropharyngeal erythema.   Eyes:      Extraocular Movements: Extraocular movements intact.      Conjunctiva/sclera: Conjunctivae normal.      Pupils: Pupils are equal, round, and reactive to light.   Neck:      Musculoskeletal: Normal range of motion and neck supple.   Cardiovascular:      Rate and Rhythm: Normal rate and regular rhythm.      Pulses: Normal pulses.      Heart sounds: No murmur.   Pulmonary:      Effort: Pulmonary effort is normal. No respiratory distress.      Breath sounds: Normal breath sounds. No wheezing, rhonchi or rales.   Abdominal:      General: Abdomen is flat. Bowel sounds are normal. There is no distension.      Palpations: Abdomen is soft. There is no mass.      Tenderness: There is no abdominal tenderness. There is no guarding.   Musculoskeletal: Normal range of motion.         General: No swelling or tenderness.   Skin:     General: Skin is warm and dry.   Neurological:      General: No focal deficit present.      Mental Status: She is alert and oriented to person, place, and time.      Cranial Nerves: No cranial nerve deficit.      Sensory: No sensory deficit.      Motor: No weakness.   Psychiatric:         Mood and Affect: Mood normal.         Behavior: Behavior normal.         Thought Content: Thought content normal.           Labs:   Results for orders placed or performed during the hospital encounter of 03/22/20   DIAGNOSTIC ALCOHOL   Result Value Ref Range    Diagnostic Alcohol 357.4 (H) 0.0 - 10.1 mg/dL   CBC WITH DIFFERENTIAL   Result Value Ref Range    WBC 1.9 (LL) 4.8 - 10.8 K/uL    RBC 4.06 (L) 4.20  - 5.40 M/uL    Hemoglobin 13.5 12.0 - 16.0 g/dL    Hematocrit 40.3 37.0 - 47.0 %    MCV 99.3 (H) 81.4 - 97.8 fL    MCH 33.3 (H) 27.0 - 33.0 pg    MCHC 33.5 (L) 33.6 - 35.0 g/dL    RDW 53.0 (H) 35.9 - 50.0 fL    Platelet Count 67 (L) 164 - 446 K/uL    MPV 9.3 9.0 - 12.9 fL    Neutrophils-Polys 33.80 (L) 44.00 - 72.00 %    Lymphocytes 43.80 (H) 22.00 - 41.00 %    Monocytes 14.10 (H) 0.00 - 13.40 %    Eosinophils 2.60 0.00 - 6.90 %    Basophils 4.70 (H) 0.00 - 1.80 %    Immature Granulocytes 1.00 (H) 0.00 - 0.90 %    Nucleated RBC 0.00 /100 WBC    Neutrophils (Absolute) 0.65 (L) 2.00 - 7.15 K/uL    Lymphs (Absolute) 0.84 (L) 1.00 - 4.80 K/uL    Monos (Absolute) 0.27 0.00 - 0.85 K/uL    Eos (Absolute) 0.05 0.00 - 0.51 K/uL    Baso (Absolute) 0.09 0.00 - 0.12 K/uL    Immature Granulocytes (abs) 0.02 0.00 - 0.11 K/uL    NRBC (Absolute) 0.00 K/uL   COMP METABOLIC PANEL   Result Value Ref Range    Sodium 149 (H) 135 - 145 mmol/L    Potassium 3.2 (L) 3.6 - 5.5 mmol/L    Chloride 109 96 - 112 mmol/L    Co2 20 20 - 33 mmol/L    Anion Gap 20.0 (H) 7.0 - 16.0    Glucose 86 65 - 99 mg/dL    Bun 8 8 - 22 mg/dL    Creatinine 0.78 0.50 - 1.40 mg/dL    Calcium 9.2 8.5 - 10.5 mg/dL    AST(SGOT) 104 (H) 12 - 45 U/L    ALT(SGPT) 41 2 - 50 U/L    Alkaline Phosphatase 134 (H) 30 - 99 U/L    Total Bilirubin 2.3 (H) 0.1 - 1.5 mg/dL    Albumin 4.2 3.2 - 4.9 g/dL    Total Protein 6.6 6.0 - 8.2 g/dL    Globulin 2.4 1.9 - 3.5 g/dL    A-G Ratio 1.8 g/dL   LIPASE   Result Value Ref Range    Lipase 26 11 - 82 U/L   TROPONIN   Result Value Ref Range    Troponin T 11 6 - 19 ng/L   CREATINE KINASE   Result Value Ref Range    CPK Total 147 0 - 154 U/L   LACTIC ACID   Result Value Ref Range    Lactic Acid 6.5 (HH) 0.5 - 2.0 mmol/L   URINALYSIS CULTURE, IF INDICATED   Result Value Ref Range    Color DK Yellow     Character Clear     Specific Gravity 1.012 <1.035    Ph 5.5 5.0 - 8.0    Glucose Negative Negative mg/dL    Ketones Negative Negative mg/dL     Protein 30 (A) Negative mg/dL    Bilirubin Negative Negative    Urobilinogen, Urine 1.0 Negative    Nitrite Negative Negative    Leukocyte Esterase Negative Negative    Occult Blood Negative Negative    Micro Urine Req Microscopic    PROTHROMBIN TIME (INR)   Result Value Ref Range    PT 15.4 (H) 12.0 - 14.6 sec    INR 1.19 (H) 0.87 - 1.13   APTT   Result Value Ref Range    APTT 33.5 24.7 - 36.0 sec   URINE DRUG SCREEN   Result Value Ref Range    Amphetamines Urine Negative Negative    Barbiturates Negative Negative    Benzodiazepines Negative Negative    Cocaine Metabolite Negative Negative    Methadone Negative Negative    Opiates Negative Negative    Oxycodone Negative Negative    Phencyclidine -Pcp Negative Negative    Propoxyphene Negative Negative    Cannabinoid Metab Positive (A) Negative   AMMONIA   Result Value Ref Range    Ammonia 36 11 - 45 umol/L   ESTIMATED GFR   Result Value Ref Range    GFR If African American >60 >60 mL/min/1.73 m 2    GFR If Non African American >60 >60 mL/min/1.73 m 2   MAGNESIUM   Result Value Ref Range    Magnesium 1.9 1.5 - 2.5 mg/dL   LACTIC ACID   Result Value Ref Range    Lactic Acid 5.7 (HH) 0.5 - 2.0 mmol/L   URINE MICROSCOPIC (W/UA)   Result Value Ref Range    WBC 0-2 /hpf    RBC 2-5 (A) /hpf    Bacteria Negative None /hpf    Epithelial Cells Negative /hpf    Hyaline Cast 3-5 (A) /lpf   LACTIC ACID   Result Value Ref Range    Lactic Acid 5.4 (HH) 0.5 - 2.0 mmol/L   CBC with Differential   Result Value Ref Range    WBC 1.8 (LL) 4.8 - 10.8 K/uL    RBC 3.36 (L) 4.20 - 5.40 M/uL    Hemoglobin 11.0 (L) 12.0 - 16.0 g/dL    Hematocrit 34.0 (L) 37.0 - 47.0 %    .2 (H) 81.4 - 97.8 fL    MCH 32.7 27.0 - 33.0 pg    MCHC 32.4 (L) 33.6 - 35.0 g/dL    RDW 52.4 (H) 35.9 - 50.0 fL    Platelet Count 60 (L) 164 - 446 K/uL    MPV 9.9 9.0 - 12.9 fL    Neutrophils-Polys 21.80 (L) 44.00 - 72.00 %    Lymphocytes 56.40 (H) 22.00 - 41.00 %    Monocytes 17.90 (H) 0.00 - 13.40 %    Eosinophils  1.70 0.00 - 6.90 %    Basophils 2.20 (H) 0.00 - 1.80 %    Immature Granulocytes 0.00 0.00 - 0.90 %    Nucleated RBC 0.00 /100 WBC    Neutrophils (Absolute) 0.39 (LL) 2.00 - 7.15 K/uL    Lymphs (Absolute) 1.01 1.00 - 4.80 K/uL    Monos (Absolute) 0.32 0.00 - 0.85 K/uL    Eos (Absolute) 0.03 0.00 - 0.51 K/uL    Baso (Absolute) 0.04 0.00 - 0.12 K/uL    Immature Granulocytes (abs) 0.00 0.00 - 0.11 K/uL    NRBC (Absolute) 0.00 K/uL   Comp Metabolic Panel (CMP)   Result Value Ref Range    Sodium 144 135 - 145 mmol/L    Potassium 3.9 3.6 - 5.5 mmol/L    Chloride 110 96 - 112 mmol/L    Co2 20 20 - 33 mmol/L    Anion Gap 14.0 7.0 - 16.0    Glucose 65 65 - 99 mg/dL    Bun 6 (L) 8 - 22 mg/dL    Creatinine 0.49 (L) 0.50 - 1.40 mg/dL    Calcium 8.8 8.5 - 10.5 mg/dL    AST(SGOT) 73 (H) 12 - 45 U/L    ALT(SGPT) 32 2 - 50 U/L    Alkaline Phosphatase 107 (H) 30 - 99 U/L    Total Bilirubin 1.6 (H) 0.1 - 1.5 mg/dL    Albumin 3.5 3.2 - 4.9 g/dL    Total Protein 5.5 (L) 6.0 - 8.2 g/dL    Globulin 2.0 1.9 - 3.5 g/dL    A-G Ratio 1.8 g/dL   Magnesium   Result Value Ref Range    Magnesium 2.1 1.5 - 2.5 mg/dL   ESTIMATED GFR   Result Value Ref Range    GFR If African American >60 >60 mL/min/1.73 m 2    GFR If Non African American >60 >60 mL/min/1.73 m 2   LACTIC ACID   Result Value Ref Range    Lactic Acid 1.9 0.5 - 2.0 mmol/L   EKG (NOW)   Result Value Ref Range    Report       [facility] Emergency Dept.    Test Date:  2020  Pt Name:    GUMARO ALLEN                  Department: ER  MRN:        [id number]                      Room:       [facility]  Gender:     Female                       Technician: [id number]  :        [id number]                   Requested By:NOEMI CALLOWAY  Order #:    [id number]                    Reading MD: Noemi Calloway    Measurements  Intervals                                Axis  Rate:       123                          P:          53  KY:         144                          QRS:        61  QRSD:        86                           T:          -66  QT:         284  QTc:        407    Interpretive Statements  SINUS TACHYCARDIA rate 123  Normal axis  Normal intervals  No ST elevation  Minimal ST depression V3-V6  BORDERLINE LOW VOLTAGE IN FRONTAL LEADS  BORDERLINE T ABNORMALITIES, INFERIOR LEADS  Compared to ECG 11/14/2017 05:12:52  T-wave abnormality now present  Sinus rhythm no longer present  Electronically Signed On 3-22 -2020 14:04:21 PDT by Noemi Calloway         Imaging:   CT-HEAD W/O   Final Result      1.  No evidence of acute intracranial process.      2.  Atrophy.      CT-CSPINE WITHOUT PLUS RECONS   Final Result      1.  No evidence of cervical spine fracture.      2.  Multilevel degenerative disc disease.      DX-CHEST-PORTABLE (1 VIEW)   Final Result      No evidence of acute cardiopulmonary process.      DX-ELBOW-COMPLETE 3+ LEFT   Final Result      No evidence of acute fracture or dislocation.          * Alcohol intoxication (HCC)  Assessment & Plan  -History of alcohol Abuse  - > 4 vodka drinks/day  -  -AST:ALT 2:1  -Elevated Alkaline phosphatase  -Associated with lactic acidosis  -associated N/V improved. Tolerating PO diet  -No hx of severe ETOH withdrawals per patient    -Currently low CIWA scores  -thrombocytopenia, neutropenia, AST/ALT, T Dany consistent with significant EtOH use   Plan:  -Multivitamins  -Thiamine and Folic acid  -patient was provided with Community resources for substance use disorders      Hypokalemia  Assessment & Plan  3.2 in the ED  GiveN 40 mEQ and 20mEq in LR  Plan:  -resolved    Seizure (HCC)  Assessment & Plan  Unknown Etiology  Lupus?  H/o TBI  H/o admission secondary to alcohol abuse twice in 2017  Assessed by neurology in ER  Discontinued zonisamide and started on Keppra  Plan:  -Continue Keppra per neurology  -Routine EEG  -Keep Mag>2  -patient to schedule two week f/u for recent medication adjustment.     Lactic acid acidosis  Assessment & Plan  Type B  Lactic  Acidosis  6.5 --> 5.7 --> 5.4 -> 1.9  Likely secondary to alcohol abuse and poor PO intake  Plan:  -improving, tolerating PO intake

## 2020-03-23 NOTE — PROGRESS NOTES
Handoff report received from [facility]. Patient is currently resting in bed, and POC discussed with patient. Patient is currently an elopement risk, but has agreed to stay for now. Patient is AAOx4, on RA, VSS, and Tele monitor on. Patient educated on use of call light and need to call for assistance. Will continue to monitor.

## 2020-03-23 NOTE — ASSESSMENT & PLAN NOTE
Type B  Lactic Acidosis  6.5 --> 5.7 --> 5.4 -> 1.9  Likely secondary to alcohol abuse and poor PO intake  Plan:  -improving, tolerating PO intake

## 2020-03-23 NOTE — ASSESSMENT & PLAN NOTE
Unknown Etiology  Lupus?  H/o TBI  H/o admission secondary to alcohol abuse twice in 2017  Assessed by neurology in ER  Discontinued zonisamide and started on Keppra  Plan:  -Continue Keppra per neurology  -Routine EEG  -Keep Mag>2  -patient to schedule two week f/u for recent medication adjustment.

## 2020-03-23 NOTE — CARE PLAN
Problem: Communication  Goal: The ability to communicate needs accurately and effectively will improve  Outcome: PROGRESSING AS EXPECTED  Note:  Pt verbalizes needs appropriately.       Problem: Bowel/Gastric:  Goal: Normal bowel function is maintained or improved  Outcome: PROGRESSING AS EXPECTED   Note: Pt maintaining normal bowel function.

## 2020-03-23 NOTE — SENIOR ADMIT NOTE
Senior Admit Note    Patient: Zari Day.  MRN: [id number].                               Chief complaint: fall two days ago with head trauma, unusual behavior    HPI:  Mrs Day is a 54 y F w/ PMHx ?lupus, seizures due to TBI, alcohol abuse, chronic leukopenia/thrombocyptopenia, who presents for fall two days ago and unusual behavior per . Patient reports slipping on ice, hitting her head and left side, without loss of consciousness.  was worried about patient as she has been acting strangely, volatile. Patient is upset at bedside, wants to go home, denies any known seizures recently. She reports a bump on her left head, otherwise no focal complaints. She reports she drinks vodka daily, last drink this morning, maybe 3-4 vodka drinks. She does not recall getting a CT head earlier today.    In the ER, patient , RR 22, otherwise vital signs stable. Labs show chronic leukopenia and thrombocytopenia WBC 1.9, Plt 67, Na 149, K 3.2, CO2 20, AST/ALT chronically elevated 100's, t bili 2.3, LA 6.5, . Repeat LA down to 5.4. Patient was placed on legal hold in the ER due to possible harm to self. CT head shows atrophy but no acute process, CT cervical spine shows no acute fracture. Neurology saw patient in ER and recommend discontinuing home zonisamide for seizures, starting keppra; routine EEG. Patient will be admitted to medicine vs ICU for management of alcohol withdrawal, seizures, lactic acidosis. Continue legal hold with 1:1 sitter; behavioral medicine consult in the morning. Discussion with ICU attending, telemetry charge nurse ongoing given elevated lactic acid. Elevated lactic acid likely due to alcoholism vs recent seizure, no signs of focal infection or sepsis at this time.    For complete details, please refer to H&P by intern.     Sterling Layton M.D.     Addendum: Clarified with ER staff, patient is not/has not been on legal hold. Do not need legal hold at this time.  Patient to be  admitted to telemetry medicine floor.

## 2020-03-23 NOTE — DISCHARGE INSTRUCTIONS
"Discharge Instructions    Discharged to home by car with relative. Discharged via wheelchair, hospital escort: Refused.  Special equipment needed: Not Applicable    Be sure to schedule a follow-up appointment with your primary care doctor or any specialists as instructed.     Discharge Plan:   Diet Plan: Discussed  Activity Level: Discussed  Confirmed Follow up Appointment: Appointment Scheduled  Confirmed Symptoms Management: Discussed  Medication Reconciliation Updated: Yes  Influenza Vaccine Indication: Patient Refuses    I understand that a diet low in cholesterol, fat, and sodium is recommended for good health. Unless I have been given specific instructions below for another diet, I accept this instruction as my diet prescription.   Other diet: none    Special Instructions: None    · Is patient discharged on Warfarin / Coumadin?   No       How Much is Too Much Alcohol?  Drinking too much alcohol can cause injury, accidents, and health problems. These types of problems can include:   · Car crashes.  · Falls.  · Family fighting (domestic violence).  · Drowning.  · Fights.  · Injuries.  · Burns.  · Damage to certain organs.  · Having a baby with birth defects.  ONE DRINK CAN BE TOO MUCH WHEN YOU ARE:  · Working.  · Pregnant or breastfeeding.  · Taking medicines. Ask your doctor.  · Driving or planning to drive.  WHAT IS A STANDARD DRINK?   · 1 regular beer (12 ounces or 360 milliliters).  · 1 glass of wine (5 ounces or 150 milliliters).  · 1 shot of liquor (1.5 ounces or 45 milliliters).  BLOOD ALCOHOL LEVELS   · .00 A person is sober.  · .03 A person has no trouble keeping balance, talking, or seeing right, but a \"buzz\" may be felt.  · .05 A person feels \"buzzed\" and relaxed.  · .08 or .10  A person is drunk. He or she has trouble talking, seeing right, and keeping his or her balance.  · .15 A person loses body control and may pass out (blackout).  · .20 A person has trouble walking (staggering) and throws up " (vomits).  · .30 A person will pass out (unconscious).  · .40+ A person will be in a coma. Death is possible.  If you or someone you know has a drinking problem, get help from a doctor.   Document Released: 10/14/2010 Document Revised: 03/11/2013 Document Reviewed: 10/14/2010  Exit[employer]® Patient Information ©2014 [employer].    Lactic Acid Test  The lactic acid test is a blood test that helps determine how much oxygen your body tissues are getting. Sugar (glucose) is used by your body for energy. When the oxygen supply to tissues is normal, glucose is broken down (metabolized) into carbon dioxide and water. However, when the oxygen supply is lower than normal, lactic acid (lactate) is created instead. Anything that decreases your ability to get oxygen to your cells can increase lactate levels.   Because this test determines the amount of decreased oxygen supply to cells (hypoxia), it may be done to evaluate the level of oxygen in your tissues after you have been diagnosed with recent or current shock, seizures, or a blocked blood vessel. The test may also be used to monitor success of treatment for these conditions or others that can cause tissue hypoxia.  PREPARATION FOR TEST  There is no preparation required for this test.  RESULTS  It is your responsibility to obtain your test results. Ask the lab or department performing the test when and how you will get your results. Talk with your health care provider if you have any questions about your results.   Range of Normal Values  Ranges for normal values may vary among different labs and hospitals. You should always check with your health care provider after having lab work or other tests done to discuss whether your values are considered within normal limits.  Normal values for the lactic acid test are as follows:  · Venous blood: 5-20 mg/dL or 0.6-2.2 mmol/L (SI units).  · Arterial blood: 3-7 mg/dL or 0.3-0.8 mmol/L (SI units).  Several factors can affect the  results of the lactic acid test. These may include:  · Prolonged use of an elastic strap, also called a tourniquet, while your blood is being drawn for the test.  · Clenching of your hand or fist while your blood is being drawn for the test.  · Vigorous exercise on the day of the test, before your blood is drawn.  · Medicines that can increase lactate levels in your blood, such as:  ¨ Aspirin.  ¨ Cyanide.  ¨ Ethanol.  ¨ Nalidixic acid.  ¨ Phenformin.  Meaning of Results Outside Normal Value Ranges  Abnormally high lactate levels may indicate many health conditions. These may include:  · Recent or current shock.  · Recent seizure.  · Tissue hypoxia.  · Lack of blood supply to cells (tissue ischemia).  · Carbon monoxide poisoning.  · Severe liver disease.  · Genetic conditions resulting in abnormal glucose metabolism.  · Diabetes mellitus.  Discuss your test results with your health care provider. He or she will use the results to make a diagnosis and determine a treatment plan that is right for you.     This information is not intended to replace advice given to you by your health care provider. Make sure you discuss any questions you have with your health care provider.     Document Released: 01/20/2006 Document Revised: 01/08/2016 Document Reviewed: 05/07/2015  [employer] Interactive Patient Education ©2016 [employer]    Depression / Suicide Risk    As you are discharged from this [facility] facility, it is important to learn how to keep safe from harming yourself.    Recognize the warning signs:  · Abrupt changes in personality, positive or negative- including increase in energy   · Giving away possessions  · Change in eating patterns- significant weight changes-  positive or negative  · Change in sleeping patterns- unable to sleep or sleeping all the time   · Unwillingness or inability to communicate  · Depression  · Unusual sadness, discouragement and loneliness  · Talk of wanting to die  · Neglect of  personal appearance   · Rebelliousness- reckless behavior  · Withdrawal from people/activities they love  · Confusion- inability to concentrate     If you or a loved one observes any of these behaviors or has concerns about self-harm, here's what you can do:  · Talk about it- your feelings and reasons for harming yourself  · Remove any means that you might use to hurt yourself (examples: pills, rope, extension cords, firearm)  · Get professional help from the community (Mental Health, Substance Abuse, psychological counseling)  · Do not be alone:Call your Safe Contact- someone whom you trust who will be there for you.  · Call your local CRISIS HOTLINE 881-3354 or 888-236-4661  · Call your local Children's Mobile Crisis Response Team [place] (291) 264-6458 or www.[employer]  · Call the toll free National Suicide Prevention Hotlines   · National Suicide Prevention Lifeline 731-515-MMKR (7136)  · National Hope Line Network 800-SUICIDE (029-9747)

## 2020-03-23 NOTE — H&P
History & Physical Note    Date of Admission: 3/22/2020  [facility]  [facility]  Attending: Dr Caraballo  Senior Resident: Dr. Daniel  Intern: Dr. Downing  Contact Number: [phone]    Chief Complaint: Fall two days ago associated with head trauma, unusual beahvior    History of Present Illness (HPI):   Zari is a 54 y.o. female with pmh of lupus?, traumatic brain injury, seizures, alcohol abuse associated with seizures secondary to alcohol withdrawal(prior note in November and June 2017), chronic leukopenia and thrombocytopenia likely secondary to alcohol abuse who presented on 3/22/2020 with history of fall 2 days ago and unusual behavior per the patient's .    Per the patient she slipped on ice 2 days ago and hit her head and  Left elbow.  Per the patient's  she has been acting strange.  The patient reports of drinking vodka daily, last drink at 6 AM(4 drinks of vodka this morning).  When asked about her strange behavior the patient reports she often has episodes of confusion secondary to traumatic brain injury.  She denies any symptoms.  However reports of episodic nausea and vomiting, decreased appetite. The patient often said during the interview that she would like to go home.      In the ER the patient was tachycardic heart rate 130, and tachypneic 22.  Labs evident with chronic leukopenia WBC 1.9 and thrombocytopenia Plt 67. Metabolic panel: Hypokalemia K 3.2, mild hypernatremia sodium 149.  History of chronically elevated liver enzymes : ALT 41 (2:1 secondary to alcohol abuse), elevated alkaline phosphatase 134 and elevated T bili 2.3.  Elevated lactic acid 5.7 which decreased to 5.4. Blood alcohol level 357.4 . UDS positive for marijuana. INR 1.19.The patient was placed on legal hold in the ER by the nurse secondary to being very aggressive.  Neurology was consulted who recommended discontinuing home zonisamide for seizures, starting keppra.  The patient  was fluid resuscitated with 3 L of LR.  Oral potassium 40 M EQ and 20 KCl in LR. She received 2mg ativan in ER and a Bolus of Keppra, diphenhydramine.    Imaging:CT head shows atrophy but no acute process, CT cervical spine shows no acute fracture, but degenerative changes.    Review of Systems:     Review of Systems   Constitutional: Negative for chills and fever.   HENT: Negative for congestion, nosebleeds and sore throat.    Eyes: Negative for blurred vision and double vision.   Respiratory: Negative for cough and shortness of breath.    Cardiovascular: Negative for chest pain and leg swelling.   Gastrointestinal: Negative for abdominal pain, heartburn and nausea.   Genitourinary: Negative for dysuria and urgency.   Musculoskeletal: Negative for joint pain and myalgias.   Skin: Negative for rash.   Neurological: Negative for sensory change and headaches.   Psychiatric/Behavioral: Positive for substance abuse.         Past Medical History:   Past Medical History was reviewed with patient.   has a past medical history of Alcoholic hepatitis, Sebaceous cyst (9/12/2018), Seizure cerebral (6/22/2018), and Thrombocytopenia (HCC).    Past Surgical History: Past Surgical History was reviewed with patient.   has a past surgical history that includes bone marrow aspiration (Left, 3/7/2019) and bone marrow biopsy, ndl/trocar (Left, 3/7/2019).    Medications: Medications have been reviewed with patient.  Prior to Admission Medications   Prescriptions Last Dose Informant Patient Reported? Taking?   gabapentin (NEURONTIN) 300 MG Cap prn at HS Patient Yes Yes   Sig: Take 300 mg by mouth as needed (pain).   zonisamide (ZONEGRAN) 100 MG Cap 3/21/2020 at HS Patient No No   Sig: Take 1 Cap by mouth every bedtime.      Facility-Administered Medications: None        Allergies: Allergies have been reviewed with patient.  No Known Allergies     Family History:   family history includes Alcohol/Drug in her father and mother; Autoimmune  Disease in her daughter and son; Breast Cancer in her mother; Hypertension in her father and mother. Father  of metastatic lung cancer    Social History:   Tobacco: none  Alcohol: Per patient quit for 30 years and then restarted drinking  Recreational drugs (illegal and prescription):  Marijuana for the last 6 years  Employment: none  Activity Level: moderate  Living situation:  Lives near [facility]  Recent travel:  none  Primary Care Provider: sara Ibarra M.D.  Other (stressors, spirituality, exposures):    Physical Exam:     Vitals:  Temp:  [36.4 °C (97.6 °F)-36.6 °C (97.8 °F)] 36.4 °C (97.6 °F)  Pulse:  [] 103  Resp:  [15-25] 18  BP: (121-155)/(81-97) 131/85  SpO2:  [92 %-96 %] 96 %    Physical Exam  Constitutional:       General: She is not in acute distress.     Appearance: Normal appearance. She is not ill-appearing or diaphoretic.   HENT:      Head: Normocephalic.      Nose: No rhinorrhea.      Mouth/Throat:      Pharynx: No oropharyngeal exudate.   Eyes:      General: No scleral icterus.     Extraocular Movements: Extraocular movements intact.      Conjunctiva/sclera: Conjunctivae normal.      Pupils: Pupils are equal, round, and reactive to light.   Neck:      Musculoskeletal: Normal range of motion and neck supple. No neck rigidity.   Cardiovascular:      Rate and Rhythm: Tachycardia present.      Pulses: Normal pulses.      Heart sounds: Normal heart sounds. No murmur.   Pulmonary:      Effort: Pulmonary effort is normal. No respiratory distress.      Breath sounds: Normal breath sounds.   Abdominal:      General: Abdomen is flat. Bowel sounds are normal. There is no distension.      Tenderness: There is no abdominal tenderness.   Musculoskeletal: Normal range of motion.         General: No swelling or tenderness.   Skin:     General: Skin is warm and dry.      Coloration: Skin is not jaundiced.   Neurological:      General: No focal deficit present.      Mental Status: She is  alert. Mental status is at baseline.      Cranial Nerves: No cranial nerve deficit.      Sensory: No sensory deficit.      Motor: No weakness.      Coordination: Coordination normal.   Psychiatric:         Mood and Affect: Mood normal.         Labs:   Lab Results   Component Value Date/Time    SODIUM 149 (H) 03/22/2020 12:27 PM    POTASSIUM 3.2 (L) 03/22/2020 12:27 PM    CHLORIDE 109 03/22/2020 12:27 PM    CO2 20 03/22/2020 12:27 PM    GLUCOSE 86 03/22/2020 12:27 PM    BUN 8 03/22/2020 12:27 PM    CREATININE 0.78 03/22/2020 12:27 PM    CREATININE 0.9 10/10/2006 07:30 PM      Lab Results   Component Value Date/Time    WBC 1.9 (LL) 03/22/2020 12:27 PM    RBC 4.06 (L) 03/22/2020 12:27 PM    HEMOGLOBIN 13.5 03/22/2020 12:27 PM    HEMATOCRIT 40.3 03/22/2020 12:27 PM    MCV 99.3 (H) 03/22/2020 12:27 PM    MCH 33.3 (H) 03/22/2020 12:27 PM    MCHC 33.5 (L) 03/22/2020 12:27 PM    MPV 9.3 03/22/2020 12:27 PM    NEUTSPOLYS 33.80 (L) 03/22/2020 12:27 PM    LYMPHOCYTES 43.80 (H) 03/22/2020 12:27 PM    MONOCYTES 14.10 (H) 03/22/2020 12:27 PM    EOSINOPHILS 2.60 03/22/2020 12:27 PM    BASOPHILS 4.70 (H) 03/22/2020 12:27 PM    ANISOCYTOSIS 1+ 11/12/2017 03:47 PM        EKG: Per my read, Sinus Tachycardia    Imaging:   CT-HEAD W/O   Final Result      1.  No evidence of acute intracranial process.      2.  Atrophy.      CT-CSPINE WITHOUT PLUS RECONS   Final Result      1.  No evidence of cervical spine fracture.      2.  Multilevel degenerative disc disease.      DX-CHEST-PORTABLE (1 VIEW)   Final Result      No evidence of acute cardiopulmonary process.      DX-ELBOW-COMPLETE 3+ LEFT   Final Result      No evidence of acute fracture or dislocation.          Previous Data Review: reviewed    * Lactic acid acidosis  Assessment & Plan  Type B  Lactic Acidosis  6.5 --> 5.7 --> 5.4  Likely secondary to alcohol abuse  And related hepatic dysfunction      Plan:  Inpatient Admit    Telemetry  Fluid resusitation    Seizure (HCC)  Assessment  & Plan  Unknown Etiology  Lupus?  H/o TBI  H/o admission secondary to alcohol abuse twice in 2017  Assessed by neurology in ER  Discontinued zonisamide and started on Keppra    Plan:  Continue Keppra per neurology  Routine EEG  Keep Mag>2      Alcohol intoxication (HCC)  Assessment & Plan  History of alcohol Abuse  Daily over 4 vodka drinks  Last drink this morning    AST:ALT 2:1  Elevated Alkaline phosphatase  Associated with lactic acidosis  Decreased Appetite    Plan:  CIWA protocol  Multivitamins  Thiamine and Folic acid   consult      Hypokalemia  Assessment & Plan  3.2 in the ED  GiveN 40 mEQ and 20mEq in LR      Plan:  Monitor and replete

## 2020-03-23 NOTE — PROGRESS NOTES
This RN discussed legal hold status with Dr. Daniel at bedside. Notified MD that notes indicate that legal hold was initiated in the ED on 3/22 at 4:57pm but there currently is no legal hold paperwork to be found. Patient is pleasant today. Patient indicates that she would like to return home with  if possible. No thoughts of SI or HI. MD updated. Per MD, patient does not need to be on a legal hold.
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
## 2020-03-23 NOTE — CONSULTS
Neurology Initial Consult H&P  Neurohospitalist Service, [facility]    Referring Physician: Ken Tenorio M.D.    No chief complaint on file.      HPI: Zari Day is a 54 y.o. female with history of alcoholic hepatitis, seizures, thrombocytopenia, lupus head injury 2 days ago the patient slipped on ice, episode of seizures in the ER neurology was called to discuss her seizure medication as there was concern of neutropenia with zonisamide.  We agreed to switch to Keppra 500 mg twice daily after initial 1 g IV load.  ,  Review of systems: In addition to what is detailed in the HPI above, (and scanned into the chart if and when applicable), all other systems reviewed and are negative.    Past Medical History:    has a past medical history of Alcoholic hepatitis, Sebaceous cyst (9/12/2018), Seizure cerebral (6/22/2018), and Thrombocytopenia (HCC).    FHx:  family history includes Alcohol/Drug in her father and mother; Autoimmune Disease in her daughter and son; Breast Cancer in her mother; Hypertension in her father and mother.    SHx:   reports that she has quit smoking. She quit after 5.00 years of use. She has never used smokeless tobacco. She reports current drug use. Drug: Marijuana. She reports that she does not drink alcohol.    Allergies:  No Known Allergies    Medications:    Current Facility-Administered Medications:   •  potassium chloride 20 mEq in LR 1,000 mL infusion, , Intravenous, Continuous, Veronika Benitez M.D., Last Rate: 125 mL/hr at 03/22/20 1650  •  senna-docusate (PERICOLACE or SENOKOT S) 8.6-50 MG per tablet 2 Tab, 2 Tab, Oral, BID **AND** polyethylene glycol/lytes (MIRALAX) PACKET 1 Packet, 1 Packet, Oral, QDAY PRN **AND** magnesium hydroxide (MILK OF MAGNESIA) suspension 30 mL, 30 mL, Oral, QDAY PRN **AND** bisacodyl (DULCOLAX) suppository 10 mg, 10 mg, Rectal, QDAY PRN, Annmarie Groves M.D.  •  acetaminophen (TYLENOL) tablet 650 mg, 650 mg, Oral, Q6HRS  PRN, Annmarie Groves M.D.  •  labetalol (NORMODYNE/TRANDATE) injection 10 mg, 10 mg, Intravenous, Q HOUR PRN **OR** labetalol (NORMODYNE) tablet 200 mg, 200 mg, Oral, Q6HRS PRN, Annmarie Groves M.D.  •  LORazepam (ATIVAN) tablet 0.5 mg, 0.5 mg, Oral, Q4HRS PRN, Annmarie Groves M.D.  •  LORazepam (ATIVAN) tablet 1 mg, 1 mg, Oral, Q4HRS PRN **OR** LORazepam (ATIVAN) injection 0.5 mg, 0.5 mg, Intravenous, Q4HRS PRN, Annmarie Groves M.D.  •  LORazepam (ATIVAN) tablet 2 mg, 2 mg, Oral, Q2HRS PRN **OR** LORazepam (ATIVAN) injection 1 mg, 1 mg, Intravenous, Q2HRS PRN, Annmarie Groves M.D.  •  LORazepam (ATIVAN) tablet 3 mg, 3 mg, Oral, Q HOUR PRN **OR** LORazepam (ATIVAN) injection 1.5 mg, 1.5 mg, Intravenous, Q HOUR PRN, Annmarie Groves M.D.  •  LORazepam (ATIVAN) tablet 4 mg, 4 mg, Oral, Q15 MIN PRN **OR** LORazepam (ATIVAN) injection 2 mg, 2 mg, Intravenous, Q15 MIN PRN, Annmarie Groves M.D.  •  chlordiazePOXIDE (LIBRIUM) capsule 50 mg, 50 mg, Oral, Q6HRS **FOLLOWED BY** [START ON 3/23/2020] chlordiazePOXIDE (LIBRIUM) capsule 25 mg, 25 mg, Oral, Q6HRS, Annmarie Groves M.D.  •  magnesium sulfate IVPB premix 2 g, 2 g, Intravenous, Once **AND** [START ON 3/23/2020] magnesium oxide (MAG-OX) tablet 400 mg, 400 mg, Oral, BID, Annmarie Groves M.D.  •  gabapentin (NEURONTIN) capsule 300 mg, 300 mg, Oral, QHS PRN, Annmarie Groves M.D.  •  [START ON 3/23/2020] levETIRAcetam (KEPPRA) 500 mg in  mL IVPB, 500 mg, Intravenous, Q12HRS, Veronika Benitez M.D.  •  thiamine (B-1) 100 mg in D5W 250 mL IVPB, 100 mg, Intravenous, Q8HRS, Eleazar Carter M.D.    Current Outpatient Medications:   •  gabapentin (NEURONTIN) 300 MG Cap, Take 300 mg by mouth as needed (pain)., Disp: , Rfl:   •  zonisamide (ZONEGRAN) 100 MG Cap, Take 1 Cap by mouth every bedtime., Disp: 30 Cap, Rfl: 11    Physical Examination:     Vitals:    03/22/20 1201 03/22/20 1325 03/22/20 1500 03/22/20 1601   BP: 127/81  125/86 137/84   Pulse: 100  (!) 133 (!) 116  "  Resp: 19  17 (!) 22   Temp:       TempSrc:       SpO2: 92%  95%    Weight:  70.3 kg (155 lb)     Height:  1.727 m (5' 8\")         General: Patient is awake and in no acute distress  Eyes: examination of optic disks not indicated at this time  CV: RRR    NEUROLOGICAL EXAM:     Mental status: Awake, alert and fully oriented, follows commands  Speech and language: speech is clear and fluent. The patient is able to name and repeat.  Cranial nerve exam: Pupils are equal, round and reactive to light bilaterally. Visual fields are full. Extraocular muscles are intact. Sensation in the face is intact to light touch. Face is symmetric. Hearing to finger rub equal. Palate elevates symmetrically. Shoulder shrug is full. Tongue is midline.  Motor exam: Strength is 5/5 in all extremities both distally and proximally. Tone is normal. No abnormal movements were seen on exam.  Sensory exam: No sensory deficits identified   Deep tendon reflexes:  2+ and symmetric. Toes down-going bilaterally.  Coordination: no ataxia   Gait: deferred for now post seizure.    Objective Data:    Labs:  Lab Results   Component Value Date/Time    PROTHROMBTM 15.4 (H) 03/22/2020 12:27 PM    INR 1.19 (H) 03/22/2020 12:27 PM      Lab Results   Component Value Date/Time    WBC 1.9 (LL) 03/22/2020 12:27 PM    RBC 4.06 (L) 03/22/2020 12:27 PM    HEMOGLOBIN 13.5 03/22/2020 12:27 PM    HEMATOCRIT 40.3 03/22/2020 12:27 PM    MCV 99.3 (H) 03/22/2020 12:27 PM    MCH 33.3 (H) 03/22/2020 12:27 PM    MCHC 33.5 (L) 03/22/2020 12:27 PM    MPV 9.3 03/22/2020 12:27 PM    NEUTSPOLYS 33.80 (L) 03/22/2020 12:27 PM    LYMPHOCYTES 43.80 (H) 03/22/2020 12:27 PM    MONOCYTES 14.10 (H) 03/22/2020 12:27 PM    EOSINOPHILS 2.60 03/22/2020 12:27 PM    BASOPHILS 4.70 (H) 03/22/2020 12:27 PM    ANISOCYTOSIS 1+ 11/12/2017 03:47 PM      Lab Results   Component Value Date/Time    SODIUM 149 (H) 03/22/2020 12:27 PM    POTASSIUM 3.2 (L) 03/22/2020 12:27 PM    CHLORIDE 109 03/22/2020 " 12:27 PM    CO2 20 03/22/2020 12:27 PM    GLUCOSE 86 03/22/2020 12:27 PM    BUN 8 03/22/2020 12:27 PM    CREATININE 0.78 03/22/2020 12:27 PM    CREATININE 0.9 10/10/2006 07:30 PM      Lab Results   Component Value Date/Time    CHOLSTRLTOT 208 (H) 11/05/2018 07:56 AM     (H) 11/05/2018 07:56 AM    HDL 58 11/05/2018 07:56 AM    TRIGLYCERIDE 88 11/05/2018 07:56 AM       Lab Results   Component Value Date/Time    ALKPHOSPHAT 134 (H) 03/22/2020 12:27 PM    ASTSGOT 104 (H) 03/22/2020 12:27 PM    ALTSGPT 41 03/22/2020 12:27 PM    TBILIRUBIN 2.3 (H) 03/22/2020 12:27 PM        Imaging/Testing:    I interpreted and/or reviewed the patient's neuroimaging    CT-HEAD W/O   Final Result      1.  No evidence of acute intracranial process.      2.  Atrophy.      CT-CSPINE WITHOUT PLUS RECONS   Final Result      1.  No evidence of cervical spine fracture.      2.  Multilevel degenerative disc disease.      DX-CHEST-PORTABLE (1 VIEW)   Final Result      No evidence of acute cardiopulmonary process.      DX-ELBOW-COMPLETE 3+ LEFT   Final Result      No evidence of acute fracture or dislocation.          Assessment and Plan:   Zari Day is a 54 y.o. female with history of alcoholic hepatitis, seizures, thrombocytopenia, lupus head injury 2 days ago the patient slipped on ice, episode of seizures in the ER neurology was called to discuss her seizure medication as there was concern of neutropenia with zonisamide.  We agreed to switch to Keppra 500 mg twice daily after initial 1 g IV load.    Patient is currently stable and on her baseline.  CT head was unremarkable.    Continue Keppra 500 mg twice daily.  Discontinue zonisamide for now.    Keep magnesium level 2.0 or above.,  Correct other metabolic, infective or electrolytes issues.    Keep on seizure precautions.    Routine EEG whenever possible.    No further recommendation from neurology.      Neurology will sign off for now, please call with question.  ,

## 2020-03-23 NOTE — CARE PLAN
Problem: Pain Management  Goal: Pain level will decrease to patient's comfort goal  Outcome: PROGRESSING AS EXPECTED     Problem: Communication  Goal: The ability to communicate needs accurately and effectively will improve  Outcome: PROGRESSING AS EXPECTED     Problem: Safety  Goal: Will remain free from injury  Outcome: PROGRESSING AS EXPECTED  Goal: Will remain free from falls  Outcome: PROGRESSING AS EXPECTED

## 2020-03-25 NOTE — DISCHARGE PLANNING
Pt called requesting to speak with a supervisor regarding her care. Information given to Gina on T-7

## 2020-03-27 ENCOUNTER — TELEPHONE (OUTPATIENT)
Dept: MEDICAL GROUP | Facility: PHYSICIAN GROUP | Age: 55
End: 2020-03-27

## 2020-03-27 ENCOUNTER — OFFICE VISIT (OUTPATIENT)
Dept: MEDICAL GROUP | Facility: PHYSICIAN GROUP | Age: 55
End: 2020-03-27
Payer: COMMERCIAL

## 2020-03-27 VITALS
WEIGHT: 153 LBS | RESPIRATION RATE: 14 BRPM | HEART RATE: 95 BPM | BODY MASS INDEX: 23.19 KG/M2 | DIASTOLIC BLOOD PRESSURE: 90 MMHG | HEIGHT: 68 IN | OXYGEN SATURATION: 98 % | SYSTOLIC BLOOD PRESSURE: 130 MMHG | TEMPERATURE: 98.3 F

## 2020-03-27 DIAGNOSIS — D69.6 THROMBOCYTOPENIA (HCC): ICD-10-CM

## 2020-03-27 DIAGNOSIS — F10.20 ALCOHOL ABUSE WITH PHYSIOLOGICAL DEPENDENCE (HCC): ICD-10-CM

## 2020-03-27 DIAGNOSIS — G40.909 SEIZURE DISORDER (HCC): Chronic | ICD-10-CM

## 2020-03-27 DIAGNOSIS — Z12.11 SCREEN FOR COLON CANCER: ICD-10-CM

## 2020-03-27 DIAGNOSIS — Z12.31 ENCOUNTER FOR SCREENING MAMMOGRAM FOR BREAST CANCER: ICD-10-CM

## 2020-03-27 DIAGNOSIS — D72.819 LEUKOPENIA, UNSPECIFIED TYPE: ICD-10-CM

## 2020-03-27 DIAGNOSIS — R63.4 WEIGHT LOSS: ICD-10-CM

## 2020-03-27 PROBLEM — G05.3 AUTOIMMUNE CEREBRITIS (HCC): Status: RESOLVED | Noted: 2019-05-03 | Resolved: 2020-03-27

## 2020-03-27 PROBLEM — M35.9 AUTOIMMUNE CEREBRITIS (HCC): Status: RESOLVED | Noted: 2019-05-03 | Resolved: 2020-03-27

## 2020-03-27 PROBLEM — L72.3 SEBACEOUS CYST: Status: RESOLVED | Noted: 2018-09-12 | Resolved: 2020-03-27

## 2020-03-27 PROBLEM — D64.9 NORMOCYTIC ANEMIA: Status: RESOLVED | Noted: 2018-08-20 | Resolved: 2020-03-27

## 2020-03-27 PROBLEM — M25.541 ARTHRALGIA OF BOTH HANDS: Status: RESOLVED | Noted: 2018-01-25 | Resolved: 2020-03-27

## 2020-03-27 PROBLEM — M25.542 ARTHRALGIA OF BOTH HANDS: Status: RESOLVED | Noted: 2018-01-25 | Resolved: 2020-03-27

## 2020-03-27 PROBLEM — Z86.19 H/O COLD SORES: Status: RESOLVED | Noted: 2018-08-20 | Resolved: 2020-03-27

## 2020-03-27 PROCEDURE — 99214 OFFICE O/P EST MOD 30 MIN: CPT | Performed by: INTERNAL MEDICINE

## 2020-03-27 ASSESSMENT — PATIENT HEALTH QUESTIONNAIRE - PHQ9: CLINICAL INTERPRETATION OF PHQ2 SCORE: 0

## 2020-03-27 ASSESSMENT — FIBROSIS 4 INDEX: FIB4 SCORE: 11.18

## 2020-03-27 NOTE — ASSESSMENT & PLAN NOTE
This is a chronic condition.  Review of her chart showed that the most recent platelet level was in the 60s   The trend has been decreasing  Patient denies any history of bleeding.

## 2020-03-27 NOTE — ASSESSMENT & PLAN NOTE
This is a chronic condition.  Patient is requesting referral to hematology service.  Patient denies any fever chills chest pain shortness of breath dizziness or any new symptoms.

## 2020-03-27 NOTE — ASSESSMENT & PLAN NOTE
This is a chronic condition.  Patient stated that she has been getting counseling from her [facility] however due to the recent coronavirus situation the chart has been closed down  The patient reported that she is having marriage issues.  Patient is requesting referral to see psychiatry  specialist.

## 2020-03-27 NOTE — TELEPHONE ENCOUNTER
1. Caller Name: Zari                          Call Back Number: [phone] (home)           2.  Does patient have any active symptoms of respiratory illness (fever OR cough OR shortness of breath)?     Unable to lvm or contact pt to ask pt about respiratory illness symptoms.

## 2020-03-27 NOTE — PROGRESS NOTES
CC: Requests referral to psychiatry  Referral to hematology         HPI: 54 y.o. Patient presents to discuss the following:     Alcohol abuse with physiological dependence (HCC)  This is a chronic condition.  Patient stated that she has been getting counseling from her [facility] however due to the recent coronavirus situation the chart has been closed down  The patient reported that she is having marriage issues.  Patient is requesting referral to see psychiatry  specialist.    Seizure disorder (HCC)  This is a chronic condition.  Patient stated that her medication was recently changed to Keppra.  No side effect reported.  Patient is now followed by neurology service.    Thrombocytopenia (HCC)  This is a chronic condition.  Review of her chart showed that the most recent platelet level was in the 60s   The trend has been decreasing  Patient denies any history of bleeding.    Leukopenia  This is a chronic condition.  Patient is requesting referral to hematology service.  Patient denies any fever chills chest pain shortness of breath dizziness or any new symptoms.              REVIEW OF SYSTEMS:  Constitutional:  no fever / chills   Eyes: no changes in vision  ENT: no sore throat, no hearing loss  CV:  no chest pain, no palpitations  Pulmonary: no SOB, no cough    GI: no nausea / vomiting, no diarrhea, no constipation, no rectal bleeding   Skin: no rash     All other systems reviewed and are negative.    Allergies: Patient has no known allergies.    Current Outpatient Medications Ordered in Epic   Medication Sig Dispense Refill   • folic acid (FOLVITE) 1 MG Tab Take 1 Tab by mouth every day. 30 Tab 0   • levETIRAcetam (KEPPRA) 500 MG Tab Take 1 Tab by mouth 2 times a day. 60 Tab 0   • multivitamin (THERAGRAN) Tab Take 1 Tab by mouth every day. 30 Tab 0   • thiamine (THIAMINE) 100 MG tablet Take 1 Tab by mouth every day. 30 Tab 0     No current Epic-ordered facility-administered medications on file.        Past Medical  History:   Diagnosis Date   • Alcoholic hepatitis    • Sebaceous cyst 9/12/2018   • Seizure cerebral 6/22/2018   • Thrombocytopenia (HCC)         Past Surgical History:   Procedure Laterality Date   • BONE MARROW ASPIRATION Left 3/7/2019    Procedure: BONE MARROW ASPIRATION - ESTER;  Surgeon: Tiffanie Osullivan M.D.;  Location: [facility];  Service: Orthopedics   • BONE MARROW BIOPSY, NDL/TROCAR Left 3/7/2019    Procedure: BONE MARROW BIOPSY, NDL/TROCAR;  Surgeon: Tiffanie Osullivan M.D.;  Location: [facility]COPY [facility];  Service: Orthopedics        Family History   Problem Relation Age of Onset   • Hypertension Mother    • Alcohol/Drug Mother    • Breast Cancer Mother    • Hypertension Father    • Alcohol/Drug Father    • Autoimmune Disease Daughter         lupus   • Autoimmune Disease Son         colitis        Social History     Tobacco Use   Smoking Status Former Smoker   • Years: 5.00   Smokeless Tobacco Never Used   Tobacco Comment    smoked in her teens           Social History     Substance and Sexual Activity   Alcohol Use No        ---------------------------------------------------------------------     PHYSICAL EXAM:  Vitals:    03/27/20 0952   BP: 130/90   Pulse: 95   Resp: 14   Temp: 36.8 °C (98.3 °F)   SpO2: 98%      Body mass index is 23.26 kg/m².        Psych: A&O x 3, mood and affect appropiate  Constitution: no acute distress  Eyes: EOMI, PERRL  Neck: supple, no LN or thyromegaly  Respiratory: normal effort, no wheezing  rales or rhonchi  CV: heart RRR  GI: abdomen is soft, nontender, no obvious mass  Skin: warm, no rash  Neuro: CN 2-12 grossly intact       ---------------------------------------------------------------------     ASSESSMENT and PLAN:     1. Leukopenia, unspecified type  Chronic condition.  - REFERRAL TO HEMATOLOGY ONCOLOGY Referral to? [facility]    2. Thrombocytopenia (HCC)  Chronic condition, decreasing platelet level  - REFERRAL TO HEMATOLOGY ONCOLOGY Referral  to? [facility]    3. Alcohol abuse with physiological dependence (HCC)  Chronic condition.  - REFERRAL TO PSYCHIATRY    4. Seizure disorder (HCC)  Chronic stable condition.  Continue with Keppra.  Advised the patient to keep the appointment to see neurology service    5. Encounter for screening mammogram for breast cancer    - MA-SCREENING MAMMO BILAT W/TOMOSYNTHESIS W/CAD; Future              Recommend follow-up approximately 6 months      PATIENT EDUCATION:  -If any problems should arise, patient was advised to contact our office or go to ER to be evaluated.  -The pertinent physical findings, assessments, and plans of care were discussed with the patient. Patient verbalized understanding.  -Advised pt to follow a healthy diet and regular aerobic exercise regimen. Advised pt to avoid alcohol and tobacco use.    Please note that this dictation was created using voice recognition software. I have made every reasonable attempt to correct obvious errors, but it is possible there are errors of grammar and possibly content that I did not discover before finalizing the note.

## 2020-03-27 NOTE — ASSESSMENT & PLAN NOTE
This is a chronic condition.  Patient stated that her medication was recently changed to Keppra.  No side effect reported.  Patient is now followed by neurology service.
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
## 2020-03-31 ENCOUNTER — OFFICE VISIT (OUTPATIENT)
Dept: NEUROLOGY | Facility: MEDICAL CENTER | Age: 55
End: 2020-03-31
Payer: COMMERCIAL

## 2020-03-31 VITALS
HEART RATE: 90 BPM | OXYGEN SATURATION: 97 % | SYSTOLIC BLOOD PRESSURE: 130 MMHG | HEIGHT: 68 IN | WEIGHT: 145.5 LBS | BODY MASS INDEX: 22.05 KG/M2 | DIASTOLIC BLOOD PRESSURE: 78 MMHG | TEMPERATURE: 96.6 F

## 2020-03-31 DIAGNOSIS — R41.3 MEMORY LOSS: ICD-10-CM

## 2020-03-31 DIAGNOSIS — F10.11 HISTORY OF ALCOHOL ABUSE: ICD-10-CM

## 2020-03-31 DIAGNOSIS — Z13.31 SCREENING FOR DEPRESSION: ICD-10-CM

## 2020-03-31 DIAGNOSIS — D69.6 THROMBOCYTOPENIA (HCC): ICD-10-CM

## 2020-03-31 DIAGNOSIS — D72.819 LEUKOPENIA, UNSPECIFIED TYPE: ICD-10-CM

## 2020-03-31 DIAGNOSIS — R56.9 WITNESSED SEIZURE-LIKE ACTIVITY (HCC): ICD-10-CM

## 2020-03-31 DIAGNOSIS — F39 MOOD DISORDER (HCC): ICD-10-CM

## 2020-03-31 PROCEDURE — 99215 OFFICE O/P EST HI 40 MIN: CPT | Performed by: NURSE PRACTITIONER

## 2020-03-31 RX ORDER — LEVETIRACETAM 500 MG/1
500 TABLET ORAL 2 TIMES DAILY
Qty: 180 TAB | Refills: 3 | Status: SHIPPED | OUTPATIENT
Start: 2020-03-31 | End: 2020-07-02 | Stop reason: SDUPTHER

## 2020-03-31 ASSESSMENT — FIBROSIS 4 INDEX: FIB4 SCORE: 11.18

## 2020-04-01 ENCOUNTER — TELEMEDICINE (OUTPATIENT)
Dept: HEALTH INFORMATION MANAGEMENT | Facility: MEDICAL CENTER | Age: 55
End: 2020-04-01
Payer: COMMERCIAL

## 2020-04-01 DIAGNOSIS — R63.4 LOSS OF WEIGHT: ICD-10-CM

## 2020-04-01 PROCEDURE — 98967 PH1 ASSMT&MGMT NQHP 11-20: CPT | Mod: CR | Performed by: DIETITIAN, REGISTERED

## 2020-04-01 NOTE — PROGRESS NOTES
"4/1/2020    Zander Okeefe M.D.  54 y.o.   Time in/out: 9:32-10:10am    COVID-19 VIRTUAL VISIT   This encounter was conducted via telephone call.   Patient initiated/verbally consented to telephone visit. Patient's identity was verified.    Anthropometrics/Objective  There were no vitals filed for this visit.    There is no height or weight on file to calculate BMI.      Estimated kcal needs: 1400 Kcals/day (mireles-benedict for weight maintenance) See comprehensive patient history form for further information     Subjective:  · Had rapid weight loss over 3-4 months  · GI issues have no known cause: N/V, early satiety, lack of appetite, no D/C  · More sensitive to sweets and salty foods, describes this has having heightened senses to these foods  · Drinks a number of supplements such as spirulina and other powdered \"superfoods\"  · Tolerates liquids better than solid foods  · Has little energy for any physical activity     Nutrition Diagnosis (PES Statement)    · Inadequate oral intake r/t GI issues with unknown etiology as evidenced by early satiety, nausea, and lack of appetite    Client history:  Condition(s) associated with a diagnosis or treatment (specify): VDD, alcohol abuse    Biochemical data, medical test and procedures  No results found for: HBA1C@  No results found for: POCGLUCOSE  Lab Results   Component Value Date/Time    CHOLSTRLTOT 208 (H) 11/05/2018 07:56 AM     (H) 11/05/2018 07:56 AM    HDL 58 11/05/2018 07:56 AM    TRIGLYCERIDE 88 11/05/2018 07:56 AM         Nutrition Intervention    Meal and Snack  Recommend a general/healthful diet    Comprehensive Nutrition education Instruction or training leading to in-depth nutrition related knowledge about:  Meal timing and spacing, Menu Planning and Increasing/Decreasing PO intake    Monitoring & Evaluation Plan    Behavioral-Environmental:  Behavior: have small meals more frequently (~5 meals/snacks per day)    Food / Nutrient Intake:  Food intake: " increase intake of nutrient and calorie dense foods    Physical Signs / Symptoms:  Weight change: maintain weight ~146 lbs    Assessment Notes: Zari is concerned about getting adequate nutrition because she doesn't eat much food d/t GI issues. She has a referral for hematology and GI to determine possible causes. We discussed ways to increase her oral intake, including having smaller meals more frequently and establishing regular mealtimes. Zari is agreeable to a consistent eating schedule, so she is going to make an effort to eat something at these times. I also recommended that she add more calorie-dense foods from healthy sources, such as avocados, nut butters, seeds, dried fruits, and whole fat dairy products. I suggested that she have oral nutrition supplements on hand, such as Ensure or Boost, because they have adequate nutrition, added calories, and are in liquid form. Zari is going to send me a list of her supplements to review, since she is concerned about getting excessive nutrients from supplementation. Pt verbalized understanding and all of his/her nutrition-related questions were answered        Follow-up: PRN

## 2020-04-13 ENCOUNTER — TELEPHONE (OUTPATIENT)
Dept: NEUROLOGY | Facility: MEDICAL CENTER | Age: 55
End: 2020-04-13

## 2020-04-13 NOTE — TELEPHONE ENCOUNTER
Pt is requesting refills on her folic acid and thiamine. I don't see you ever prescribed it but that you did order lab work but doesn't look like its been done.

## 2020-04-15 ENCOUNTER — TELEPHONE (OUTPATIENT)
Dept: HEALTH INFORMATION MANAGEMENT | Facility: OTHER | Age: 55
End: 2020-04-15

## 2020-04-15 ENCOUNTER — TELEMEDICINE (OUTPATIENT)
Dept: TELEHEALTH | Facility: TELEMEDICINE | Age: 55
End: 2020-04-15
Payer: COMMERCIAL

## 2020-04-15 DIAGNOSIS — D69.6 THROMBOCYTOPENIA (HCC): Chronic | ICD-10-CM

## 2020-04-15 DIAGNOSIS — D72.819 LEUKOPENIA, UNSPECIFIED TYPE: Chronic | ICD-10-CM

## 2020-04-15 DIAGNOSIS — J11.1 INFLUENZA-LIKE ILLNESS: ICD-10-CM

## 2020-04-15 PROCEDURE — 99213 OFFICE O/P EST LOW 20 MIN: CPT | Mod: 95,CR | Performed by: NURSE PRACTITIONER

## 2020-04-15 NOTE — TELEPHONE ENCOUNTER
Was the patient seen in the last year in this department? Yes    Does patient have an active prescription for medications requested? No     Received Request Via: Pharmacy      Pt met protocol?: Yes, ov 3/20

## 2020-04-15 NOTE — TELEPHONE ENCOUNTER
1. Caller Name: Zari Day                      Call Back Number:   [facility] PCP or Specialty Provider: Yes   Dr. Zander Okeefe        2.  Does patient have any active symptoms of respiratory illness (fever OR cough OR shortness of breath OR sore throat)? Yes, the patient reports the following respiratory symptoms: cough, dry along with headache since yesterday; not sure if thermometer reliable.    3.  Does patient have any comoribidities? Immunosuppressed    4.  Has the patient traveled in the last 14 days OR had any known contact with someone who is suspected or confirmed to have COVID-19?  No.    5. Disposition: Offered patient virtual visit to limit potential exposure to others; patient also given self care instructions    Note routed to [facility] Provider: Provider action needed: Trasferred to  for UC virtual visit

## 2020-04-15 NOTE — TELEPHONE ENCOUNTER
Dr Emilio Bustos was recently hospitalized and prescribed both of these meds 3/20. Please refill as you see fit.

## 2020-04-15 NOTE — PROGRESS NOTES
"Telemedicine Visit: Established Patient     This encounter was conducted via Zoom .   Verbal consent was obtained. Patient's identity was verified.    Subjective:   CC: Cough, headache, fever  Zari Day is a 54 y.o. female presenting for evaluation and management of:    Influenza like illness.    This is a new problem. The current episode started yesterday. The problem has been worsening. The problem occurs intermittently. The cough is dry. Associated symptoms include : fatigue, congestion, headaches, fever (99.8 per patient). Pertinent negatives include no   nausea, vomiting, diarrhea, sweats, weight loss or wheezing. Nothing aggravates the symptoms.  Patient has tried ibuprofen for the symptoms. There is no history of asthma.     Patient does have history of leukopenia, thrombocytopenia, anxiety and ETOH abuse.  Patient anxious and irrational at times during visit. States \" I am going to die because nobody will see me\". Patient declines to go to ED despite education.     Review of Systems   Constitutional: Positive for  fever and malaise/fatigue.   HENT: Positive for congestion (Mild). Negative for ear pain and sinus pain.    Eyes: Negative for blurred vision and pain.   Respiratory: Positive for cough/ Negative for shortness of breath and wheezing.    Cardiovascular: Negative for chest pain and palpitations.   Gastrointestinal: Negative for abdominal pain, constipation, diarrhea, nausea and vomiting.   Musculoskeletal: Negative for myalgias  Skin: Negative for rash.   Neurological: Positive for headaches. Negative for dizziness.     No Known Allergies    Current medicines (including changes today)  Current Outpatient Medications   Medication Sig Dispense Refill   • levETIRAcetam (KEPPRA) 500 MG Tab Take 1 Tab by mouth 2 times a day. 180 Tab 3   • folic acid (FOLVITE) 1 MG Tab Take 1 Tab by mouth every day. 30 Tab 0   • multivitamin (THERAGRAN) Tab Take 1 Tab by mouth every day. 30 Tab 0   • thiamine " (THIAMINE) 100 MG tablet Take 1 Tab by mouth every day. 30 Tab 0     No current facility-administered medications for this visit.        Patient Active Problem List    Diagnosis Date Noted   • Leukopenia 03/27/2020   • Anxiety 11/21/2018   • Thrombocytopenia (HCC) 11/21/2018   • Seizure disorder (HCC) 06/22/2018   • Vitamin deficiency 06/22/2018   • Abnormal mammogram of right breast 03/07/2018   • Alcohol abuse with physiological dependence (HCC) 01/25/2018       Family History   Problem Relation Age of Onset   • Hypertension Mother    • Alcohol/Drug Mother    • Breast Cancer Mother    • Hypertension Father    • Alcohol/Drug Father    • Autoimmune Disease Daughter         lupus   • Autoimmune Disease Son         colitis       She  has a past medical history of Alcoholic hepatitis, Sebaceous cyst (9/12/2018), Seizure cerebral (HCC) (6/22/2018), and Thrombocytopenia (HCC).  She  has a past surgical history that includes bone marrow aspiration (Left, 3/7/2019) and bone marrow biopsy, ndl/trocar (Left, 3/7/2019).       Objective:   Vitals obtained by patient:  Temp: 99.8F and Respirations through observation: 16    Physical Exam:  Constitutional: Alert and in no acute distress. Disheveled.   Skin: No rashes in visible areas.  Eye: Round. Conjunctiva clear, lids normal. No icterus.   ENMT: Lips pink without lesions, good dentition, moist mucous membranes. Phonation normal.  Neck: No masses, no thyromegaly. Moves freely without pain.  Respiratory: Unlabored respiratory effort, no cough or audible wheeze  Psych: Alert and oriented x3. Patient is very anxious and tearful.       Assessment and Plan:   The following treatment plan was discussed:     1. Influenza-like illness    2. Thrombocytopenia (HCC)    3. Leukopenia, unspecified type    Patient with underlying medical condition. Suspect COVID.  She is awaiting heme/onc referral visit. She is very anxious on virtual visit and is advised that she should be seen due to  underlying medical conditions. Patient does not wish to go in. ER precautions discussed. Advised calling [facility] for Covid testing. Continue isolation precautions. OTC medications discussed. Patient verbalized understanding.   F/U with PCP  Instructed to go to  or nearest emergency department if symptoms fail to improve, for any change in condition, further concerns, or new concerning symptoms.  Patient states understanding of the plan of care and discharge instructions.    Follow-up: Return if symptoms worsen or fail to improve.

## 2020-04-16 RX ORDER — LANOLIN ALCOHOL/MO/W.PET/CERES
100 CREAM (GRAM) TOPICAL DAILY
Qty: 30 TAB | Refills: 0 | Status: SHIPPED | OUTPATIENT
Start: 2020-04-16

## 2020-04-16 RX ORDER — FOLIC ACID 1 MG/1
1 TABLET ORAL DAILY
Qty: 30 TAB | Refills: 0 | Status: SHIPPED | OUTPATIENT
Start: 2020-04-16 | End: 2020-05-21

## 2020-04-27 ENCOUNTER — TELEPHONE (OUTPATIENT)
Dept: MEDICAL GROUP | Facility: PHYSICIAN GROUP | Age: 55
End: 2020-04-27

## 2020-04-27 NOTE — TELEPHONE ENCOUNTER
1. Caller Name: Zari                        Call Back Number: [phone] (home)         2.  Does patient have any active symptoms of respiratory illness (fever OR cough OR shortness of breath)? No.

## 2020-04-28 ENCOUNTER — TELEPHONE (OUTPATIENT)
Dept: MEDICAL GROUP | Facility: PHYSICIAN GROUP | Age: 55
End: 2020-04-28

## 2020-04-28 NOTE — LETTER
Zari Day  [address]  [id number]  [place] [address]            4/29/2020        Dear Zari,      After several attempts, Zander Okeefe M.D.'s office has been unable to reach you by phone regarding your recent referral.     We ask that you contact us at [phone] at your earliest convenience. Our office hours are from 8:00a - 5:00p Monday thru Friday.          Sincerely,        Zander Okeefe M.D.    Electronically Signed

## 2020-04-28 NOTE — TELEPHONE ENCOUNTER
----- Message from Shama Virk sent at 4/27/2020  2:55 PM PDT -----  Regarding: referral  [facility] called saying they have been calling this patient since the 3rd of April and have left 4 voicemail's to try to schedule. They just wanted to let you guys know because they will be putting this in a pending file. They also said if you guys talk to her you can let her know that she can still get scheduled she just has to call since they won't be trying anymore. Thanks a bunch

## 2020-04-29 NOTE — TELEPHONE ENCOUNTER
3rd attempt:Tried to contact patient, LVM to call back about a referral.  Letter mailed out to the patient.

## 2020-05-21 RX ORDER — FOLIC ACID 1 MG/1
TABLET ORAL
Qty: 90 TAB | Refills: 0 | Status: SHIPPED | OUTPATIENT
Start: 2020-05-21

## 2020-06-23 ENCOUNTER — TELEPHONE (OUTPATIENT)
Dept: MEDICAL GROUP | Facility: PHYSICIAN GROUP | Age: 55
End: 2020-06-23

## 2020-06-23 NOTE — TELEPHONE ENCOUNTER
1. Caller Name: Zari                          Call Back Number: [phone] (home)         How would the patient prefer to be contacted with a response: Did not specify    Pt called stating she thinks she is having a Lupus flare up again. Pt states she has some left over prednisone from last year (pt states 10 days left) and pt wants to know if she should start taking the prednisone again.  Pt states she wants medical advice before she starts medication again. Pt also states she wants to let you know that she feels like she is having memory problems also.    Please advise.

## 2020-06-24 ENCOUNTER — PATIENT MESSAGE (OUTPATIENT)
Dept: MEDICAL GROUP | Facility: PHYSICIAN GROUP | Age: 55
End: 2020-06-24

## 2020-06-25 ENCOUNTER — TELEPHONE (OUTPATIENT)
Dept: MEDICAL GROUP | Facility: PHYSICIAN GROUP | Age: 55
End: 2020-06-25

## 2020-06-25 DIAGNOSIS — D89.89 AUTOIMMUNE DISORDER (HCC): Chronic | ICD-10-CM

## 2020-06-25 NOTE — TELEPHONE ENCOUNTER
"----- Message from Zander Okeefe M.D. sent at 6/25/2020 10:31 AM PDT -----  Regarding: FW:Lupus  Contact: [phone]  Pls call pt:    I reviewed pts chart showed  pt was diagnosed with automimmune disorder. But I am unable to locate any documentation of \"Lupus\".  At this time I have submitted a referral for pt to see Rheumatology.  Per pts note pt reported that : \"...My body is swelling including headaches ie my brain\" >> Would rec for pt to go to ER to be evaluated now.    In addition, it appeared that there is a breakdown between doctor -patient relationship and patient obviously felt  that my service is not meeting her expectations : I would strongly recommend for pt to change to another pcp.   I will be available for the next 30days while pt is arranging to establish with a different pcp.    Thank you.    CC: Mr Marquez Price, Practice Supervisor  [facility].  ----- Message -----  From: Giovana Guallpa, Med Ass't  Sent: 6/25/2020   9:11 AM PDT  To: Zander Okeefe M.D.  Subject: FW:Lupus                                           ----- Message -----  From: Iona Hester  Sent: 6/25/2020   6:54 AM PDT  To: Chinedu Resendez Ass't  Subject: FW:Lupus                                           ----- Message -----  From: Zari Day  Sent: 6/24/2020   9:04 AM PDT  To: Nito Oviedo Mas  Subject: RE:Lupus                                         Im having a hard time believing this... At this point I am documenting a record. First, I have spent hours on hold with promises this doctor would call back. He NEVER did. I spent thousands of dollars paying for insurance and traveled all the way to [place]... see attached. It should be well documented I have a auto immune disease. Its either lupus or unspecified. My body is swelling including headaches ie my brain. I am in pain and sadly cant see a rheumatologist thank you Covid. Im paying for services Im not receiving. If he is not willing to call me please " put that in writing.   Regards,  Zari Day

## 2020-06-25 NOTE — PATIENT COMMUNICATION
1st Attempt:  Left voicemail message for patient to call the office back at [phone].    Sent pt my chart message.    2nd Attempt:  Left voicemail message for patient to call the office back at [phone]

## 2020-06-25 NOTE — TELEPHONE ENCOUNTER
"Regarding: FW:Lupus  Contact: [phone]  Ok to copy and paste my note  below and send to pt via my chart    \"...Pls call pt:     I reviewed pts chart showed  pt was diagnosed with automimmune disorder. But I am unable to locate any documentation of \"Lupus\".   At this time I have submitted a referral for pt to see Rheumatology.   Per pts note pt reported that : \"...My body is swelling including headaches ie my brain\" >> Would rec for pt to go to ER to be evaluated now.     In addition, it appeared that there is a breakdown between doctor -patient relationship and patient obviously felt  that my service is not meeting her expectations : I would strongly recommend for pt to change to another pcp.   I will be available for the next 30days while pt is arranging to establish with a different pcp.     Thank you.     CC: Mr Marquez Price, Practice Supervisor  [facility]. \"        ----- Message -----  From: Adriane Natarajan, Med Ass't  Sent: 6/25/2020   2:28 PM PDT  To: Zander Okeefe M.D.  Subject: RE:Lupus                                         ----- Message from Adriane Natarajan Med Ass't sent at 6/25/2020  2:28 PM PDT -----       ----- Message sent from Adriane Natarajan Med Ass't to Zari Day at 6/25/2020 11:38 AM -----   Porfirio Hameed,    Please give me a call back @ [phone].    Thanks,  Adriane SHANNON      ----- Message -----       From:Zari Day       Sent:6/24/2020  9:04 AM PDT         To:Giovana Guallpa Med Ass't    Subject:RE:Lupus    Im having a hard time believing this... At this point I am documenting a record. First, I have spent hours on hold with promises this doctor would call back. He NEVER did. I spent thousands of dollars paying for insurance and traveled all the way to [place]... see attached. It should be well documented I have a auto immune disease. Its either lupus or unspecified. My body is swelling including headaches ie my brain. I am in pain and sadly cant see a rheumatologist " "thank you Covid. Im paying for services Im not receiving. If he is not willing to call me please put that in writing.   Regards,  Zari Day      ----- Message -----       From:Giovana Guallpa, Med Ass't       Sent:6/24/2020  8:34 AM PDT         To:Zari Day    Subject:Lupus    Hi Zari,    Please see response from Dr. Okeefe.      Chart reviewed. I am unable to note any documentation from other providers regarding \"Lupus\"   Strongly rec for pt to setup appt to see pcp   If symptoms severe: Pls go to Urgent care or ER to be evaluated.        Thank you,  Giovana Guallpa  Medical Assistant    "

## 2020-06-29 ENCOUNTER — TELEPHONE (OUTPATIENT)
Dept: MEDICAL GROUP | Facility: PHYSICIAN GROUP | Age: 55
End: 2020-06-29

## 2020-06-29 NOTE — TELEPHONE ENCOUNTER
----- Message from Zari Day sent at 6/27/2020  8:42 AM PDT -----  Regarding: RE:Response from PCP Regarding Lupus  Contact: [phone]  I have contacted my rheumatologist at [facility] and they immediately scheduled me for for a video visit.  I appreciate the referral but on average they take several months. The doctor is correct I am unhappy with my level of  care. I have been to previous primary care physicians who at at least gave me prednisone until I could see a specialist. I felt I was getting no response and I have been in an exceptional mount of pain primarily in my joints.  I think at minimum I would have received a phone call.   Regards,   Zari Day
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
## 2020-07-02 ENCOUNTER — OFFICE VISIT (OUTPATIENT)
Dept: NEUROLOGY | Facility: MEDICAL CENTER | Age: 55
End: 2020-07-02
Payer: COMMERCIAL

## 2020-07-02 VITALS
DIASTOLIC BLOOD PRESSURE: 78 MMHG | WEIGHT: 168.21 LBS | SYSTOLIC BLOOD PRESSURE: 140 MMHG | OXYGEN SATURATION: 98 % | BODY MASS INDEX: 25.49 KG/M2 | TEMPERATURE: 97 F | HEIGHT: 68 IN | HEART RATE: 100 BPM | RESPIRATION RATE: 16 BRPM

## 2020-07-02 DIAGNOSIS — R41.3 MEMORY LOSS: ICD-10-CM

## 2020-07-02 DIAGNOSIS — R56.9 WITNESSED SEIZURE-LIKE ACTIVITY (HCC): ICD-10-CM

## 2020-07-02 DIAGNOSIS — F10.11 HISTORY OF ALCOHOL ABUSE: ICD-10-CM

## 2020-07-02 DIAGNOSIS — F39 MOOD DISORDER (HCC): ICD-10-CM

## 2020-07-02 DIAGNOSIS — Z13.31 SCREENING FOR DEPRESSION: ICD-10-CM

## 2020-07-02 PROCEDURE — 99214 OFFICE O/P EST MOD 30 MIN: CPT | Performed by: NURSE PRACTITIONER

## 2020-07-02 RX ORDER — LEVETIRACETAM 500 MG/1
500 TABLET ORAL 2 TIMES DAILY
Qty: 180 TAB | Refills: 3 | Status: SHIPPED | OUTPATIENT
Start: 2020-07-02

## 2020-07-02 ASSESSMENT — FIBROSIS 4 INDEX: FIB4 SCORE: 11.18

## 2020-07-14 ENCOUNTER — TELEPHONE (OUTPATIENT)
Dept: MEDICAL GROUP | Facility: PHYSICIAN GROUP | Age: 55
End: 2020-07-14

## 2020-07-14 NOTE — TELEPHONE ENCOUNTER
Called and left a voicemail for patient to address her concerns and explain Dr. Okeefe's plan of care. Based on Dr. Okeefe and Dr. Cook's documentation and support I will set patient up with new PCP. Will keep Dr. Okeefe as PCP until I am able to connect with pt

## 2020-07-30 DIAGNOSIS — D89.89 AUTOIMMUNE DISORDER (HCC): Chronic | ICD-10-CM

## 2020-08-17 ENCOUNTER — NURSE TRIAGE (OUTPATIENT)
Dept: HEALTH INFORMATION MANAGEMENT | Facility: OTHER | Age: 55
End: 2020-08-17

## 2020-08-17 NOTE — TELEPHONE ENCOUNTER
Caller with lots of anxiety, refused to go to , finally agreed to VV w/physician in PCP office.      1. Caller Name: Zari Day                 Call Back Number: [phone]  [facility] PCP or Specialty Provider: Yes         2.  In the last two weeks, has the patient had any new or worsening symptoms (not explained by alternative diagnosis)? Symptoms started 3 to 4 days ago, felt like a cold, got really bad last night, in that she was having problems swallowing, was coughing really bad, throat hurts really bad, feels like a sore throat, eyes closed over w/ discharge, cough is productive, she thought it was allergies, sore throat started last night, unable to eat hamburger last night, this morning lymph nodes swollen in throat, feels like strep throat, Yes, the patient reports the following COVID-19 consistent symptoms: runny nose.    3.  Does patient have any comoribidities? Unspecified autoimmune disease, connective tissue diease disorder    4.  Has the patient traveled in the last 14 days OR had any known contact with someone who is suspected or confirmed to have COVID-19?  No.    5. Disposition: Offered patient virtual visit to limit potential exposure to others; patient also given self care instructions    Note routed to [facility] Provider: [facility] only.

## 2020-08-20 NOTE — TELEPHONE ENCOUNTER
Spoke with Zari today for 20 minutes. Advised patient that she would need to establish with new PCP. Attempted to help patient with referral to Rheumatology and obtaining additional information for specialty office, but the patient became very upset. Also tried scheduling a new Establishing appt for patient but she declined. Patient has been blocked from scheduling appts at [place].

## 2020-08-21 ENCOUNTER — NURSE TRIAGE (OUTPATIENT)
Dept: HEALTH INFORMATION MANAGEMENT | Facility: OTHER | Age: 55
End: 2020-08-21

## 2020-08-21 NOTE — TELEPHONE ENCOUNTER
"1. Caller Name: Zari                 Call Back Number: [phone]  [facility] PCP or Specialty Provider: No  none        2.  In the last two weeks, has the patient had any new or worsening symptoms (not explained by alternative diagnosis)? No.    3.  Does patient have any comoribidities? Immunosuppressed    4.  Has the patient traveled in the last 14 days OR had any known contact with someone who is suspected or confirmed to have COVID-19?  No.    5. Disposition: Advised to perform self care, monitor for worsening symptoms and to call back in 3 days if no improvement     Patient is requesting a refill of allergy medications due to severe allergy with watery eyes.  She has no PCP and is refusing to go to .  Encouraged patient to speak with pharmacist for OTC medication comparable to her prescription allergy relief.      Note routed to [facility] Provider: [facility] only.       Reason for Disposition  • Nasal allergies occur only certain times of year    Additional Information  • Negative: Patient sounds very sick or weak to the triager  • Negative: Nasal discharge present > 10 days  • Negative: MODERATE-SEVERE nasal allergy symptoms (i.e., interfere with sleep, school, or work) and taking antihistamines > 2 days  • Negative: Patient wants to be seen  • Negative: Lots of coughing  • Negative: Lots of yellow or green discharge from nose, present > 3 days  • Negative: Wheezing (high pitched whistling sound) and previous asthma attacks or use of asthma medicines  • Negative: Doesn't match the SYMPTOMS for nasal allergy  • Negative: Nasal allergies occur only certain times of year and diagnosis of hay fever has never been confirmed by a physician  • Negative: Nasal allergies occur year-round  • Negative: Snores most nights of month    Answer Assessment - Initial Assessment Questions  1. SYMPTOM: \"What's the main symptom you're concerned about?\" (e.g., runny nose, stuffiness, sneezing, itching)      Watery eyes   2. SEVERITY: \"How " "bad is it?\" \"What does it keep you from doing?\" (e.g., sleeping, working)       Hard to see due to watery eyes  3. EYES: \"Are the eyes also red, watery, and itchy?\"       Very watery eyes  4. TRIGGER: \"What pollen or other allergic substance do you think is causing the symptoms?\"       Maybe cliff brush  5. TREATMENT: \"What medicine are you using?\" \"What medicine worked best in the past?\"      Patient has run out of prescription allergy medication and would like to get refill  6. OTHER SYMPTOMS: \"Do you have any other symptoms?\" (e.g., coughing, difficulty breathing, wheezing)      no  7. PREGNANCY: \"Is there any chance you are pregnant?\" \"When was your last menstrual period?\"      no    Protocols used: NASAL ALLERGIES (HAY FEVER)-A-[place]      "

## 2020-08-23 RX ORDER — FOLIC ACID 1 MG/1
TABLET ORAL
Qty: 90 TAB | Refills: 0 | OUTPATIENT
Start: 2020-08-23

## 2020-08-27 ENCOUNTER — TELEPHONE (OUTPATIENT)
Dept: MEDICAL GROUP | Facility: PHYSICIAN GROUP | Age: 55
End: 2020-08-27

## 2020-08-27 NOTE — TELEPHONE ENCOUNTER
"----- Message from Edmundo Hugo sent at 8/27/2020 11:11 AM PDT -----  Regarding: FW: who?  Contact: [phone]    ----- Message -----  From: Zari Day  Sent: 8/20/2020   4:31 PM PDT  To: [facility]  Subject: who?                                             Topic: General Compliment    I cant find  the appropriate contact this needs to be addressed too.  I called the referral I received for \"rheumatology\"  and they refused to make an appointment because my primary is not cooperating with releasing medical records?    please respond?    "

## 2020-08-31 NOTE — TELEPHONE ENCOUNTER
I spoke to Zari.    Zari advised to call Release of Medical Information for copies of her records.

## 2020-09-02 ENCOUNTER — NURSE TRIAGE (OUTPATIENT)
Dept: HEALTH INFORMATION MANAGEMENT | Facility: OTHER | Age: 55
End: 2020-09-02

## 2020-09-02 NOTE — TELEPHONE ENCOUNTER
"    Reason for Disposition  • Patient wants to be seen    Additional Information  • Negative: Patient attempted suicide  • Negative: Patient is threatening suicide now  • Negative: Violent behavior, or threatening to physically hurt or kill someone  • Negative: Patient is very confused (disoriented, slurred speech) and no other adult (e.g., friend or family member) available  • Negative: Difficult to awaken or acting very confused (disoriented, slurred speech) and new onset  • Negative: Sounds like a life-threatening emergency to the triager  • Negative: Depression and unable to do any of normal activities (e.g., self care, school, work; in comparison to baseline).  • Negative: Very strange or confused behavior  • Negative: Patient sounds very sick or weak to the triager  • Negative: Fever > 101 F (38.3 C)  • Negative: Sometimes has thoughts of suicide  • Negative: Symptoms interfere with work or school  • Negative: Depression is worsening (e.g.,sleeping poorly, less able to do activities of daily living)  • Negative: Significant weight loss (> 10 pounds or 5 kg) and not dieting  • Negative: Alcohol or drug abuse, known or suspected  • Negative: New or changed psychiatric medications > 2 weeks ago and not feeling any better  • Negative: Requesting to talk with a counselor (mental health worker, psychiatrist, etc.)    Answer Assessment - Initial Assessment Questions  1. CONCERN: \"What happened that made you call today?\"      depression  2. DEPRESSION SYMPTOM SCREENING: \"How are you feeling overall?\" (e.g., decreased energy, increased sleeping or difficulty sleeping, difficulty concentrating, feelings of sadness, guilt, hopelessness, or worthlessness)      anxiety  3. RISK OF HARM - SUICIDAL IDEATION:  \"Do you ever have thoughts of hurting or killing yourself?\"  (e.g., yes, no, no but preoccupation with thoughts about death)    - INTENT:  \"Do you have thoughts of hurting or killing yourself right NOW?\" (e.g., yes, no, " "N/A)    - PLAN: \"Do you have a specific plan for how you would do this?\" (e.g., gun, knife, overdose, no plan, N/A)      no  4. RISK OF HARM - HOMICIDAL IDEATION:  \"Do you ever have thoughts of hurting or killing someone else?\"  (e.g., yes, no, no but preoccupation with thoughts about death)    - INTENT:  \"Do you have thoughts of hurting or killing someone right NOW?\" (e.g., yes, no, N/A)    - PLAN: \"Do you have a specific plan for how you would do this?\" (e.g., gun, knife, no plan, N/A)       no  5. FUNCTIONAL IMPAIRMENT: \"How have things been going for you overall in your life? Have you had any more difficulties than usual doing your normal daily activities?\"  (e.g., better, same, worse; self-care, school, work, interactions)      Restrictions of Covid are wearing on me  6. SUPPORT: \"Who is with you now?\" \"Who do you live with?\" \"Do you have family or friends nearby who you can talk to?\"       , doesn't communicate with me  7. THERAPIST: \"Do you have a counselor or therapist? Name?\"      no  8. STRESSORS: \"Has there been any new stress or recent changes in your life?\"      Covid  9. DRUG ABUSE/ALCOHOL: \"Do you drink alcohol or use any illegal drugs?\"       ETOH/Marijuana  10. OTHER: \"Do you have any other health or medical symptoms right now?\" (e.g., fever)     Joint pain  11. PREGNANCY: \"Is there any chance you are pregnant?\" \"When was your last menstrual period?\"        no    Protocols used: DEPRESSION-A-[place]      "

## 2020-09-04 ENCOUNTER — NURSE TRIAGE (OUTPATIENT)
Dept: HEALTH INFORMATION MANAGEMENT | Facility: OTHER | Age: 55
End: 2020-09-04

## 2020-09-04 NOTE — TELEPHONE ENCOUNTER
"Calling regarding wanting an appt, upset about having a hard time getting calls back.     Assisted pt to make a new appt with a different provider as she thinks Dr. Okeefe \"fired\" her.     Appt made a [facility].   "

## 2020-09-04 NOTE — TELEPHONE ENCOUNTER
Phone Number Called: [phone] (home)       Call outcome: Left detailed message for patient. Informed to call back with any additional questions.    Message: left detailed vm rqsting a callback

## 2020-09-22 ENCOUNTER — HOSPITAL ENCOUNTER (OUTPATIENT)
Dept: RADIOLOGY | Facility: MEDICAL CENTER | Age: 55
End: 2020-09-22
Attending: NURSE PRACTITIONER
Payer: COMMERCIAL

## 2020-09-22 ENCOUNTER — OFFICE VISIT (OUTPATIENT)
Dept: URGENT CARE | Facility: PHYSICIAN GROUP | Age: 55
End: 2020-09-22
Payer: COMMERCIAL

## 2020-09-22 ENCOUNTER — NURSE TRIAGE (OUTPATIENT)
Dept: HEALTH INFORMATION MANAGEMENT | Facility: OTHER | Age: 55
End: 2020-09-22

## 2020-09-22 VITALS
HEIGHT: 68 IN | BODY MASS INDEX: 23.49 KG/M2 | HEART RATE: 96 BPM | RESPIRATION RATE: 16 BRPM | TEMPERATURE: 98.1 F | DIASTOLIC BLOOD PRESSURE: 88 MMHG | WEIGHT: 155 LBS | SYSTOLIC BLOOD PRESSURE: 160 MMHG | OXYGEN SATURATION: 94 %
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
## 2020-09-22 DIAGNOSIS — F07.81 POST CONCUSSION SYNDROME: ICD-10-CM

## 2020-09-22 DIAGNOSIS — S80.211A ABRASION OF RIGHT KNEE, INITIAL ENCOUNTER: ICD-10-CM

## 2020-09-22 DIAGNOSIS — M25.561 ACUTE PAIN OF RIGHT KNEE: ICD-10-CM

## 2020-09-22 PROCEDURE — 99214 OFFICE O/P EST MOD 30 MIN: CPT | Mod: 25 | Performed by: NURSE PRACTITIONER

## 2020-09-22 PROCEDURE — 90715 TDAP VACCINE 7 YRS/> IM: CPT | Performed by: NURSE PRACTITIONER

## 2020-09-22 PROCEDURE — 73562 X-RAY EXAM OF KNEE 3: CPT | Mod: RT

## 2020-09-22 PROCEDURE — 90471 IMMUNIZATION ADMIN: CPT | Performed by: NURSE PRACTITIONER

## 2020-09-22 RX ORDER — PREDNISONE 10 MG/1
TABLET ORAL
COMMUNITY
Start: 2020-08-07 | End: 2020-10-20

## 2020-09-22 RX ORDER — LIDOCAINE 50 MG/G
PATCH TOPICAL
COMMUNITY
Start: 2020-08-07 | End: 2020-10-20

## 2020-09-22 ASSESSMENT — FIBROSIS 4 INDEX: FIB4 SCORE: 11.4
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
## 2020-09-23 ENCOUNTER — NURSE TRIAGE (OUTPATIENT)
Dept: HEALTH INFORMATION MANAGEMENT | Facility: OTHER | Age: 55
End: 2020-09-23

## 2020-09-23 ASSESSMENT — ENCOUNTER SYMPTOMS
VOMITING: 0
MEMORY LOSS: 0
LOSS OF CONSCIOUSNESS: 0
NAUSEA: 1
SENSORY CHANGE: 0
CARDIOVASCULAR NEGATIVE: 1
HEADACHES: 0
FOCAL WEAKNESS: 0
SEIZURES: 0
SPEECH CHANGE: 0
CONSTITUTIONAL NEGATIVE: 1
DIZZINESS: 1
TINGLING: 0
RESPIRATORY NEGATIVE: 1
WEAKNESS: 0
BLURRED VISION: 1

## 2020-09-23 ASSESSMENT — VISUAL ACUITY: OU: 1
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
## 2020-10-12 ENCOUNTER — NURSE TRIAGE (OUTPATIENT)
Dept: HEALTH INFORMATION MANAGEMENT | Facility: OTHER | Age: 55
End: 2020-10-12
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
## 2020-10-19 ENCOUNTER — TELEPHONE (OUTPATIENT)
Dept: NEUROLOGY | Facility: MEDICAL CENTER | Age: 55
End: 2020-10-19
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
## 2020-10-20 ENCOUNTER — HOSPITAL ENCOUNTER (OUTPATIENT)
Facility: MEDICAL CENTER | Age: 55
End: 2020-10-21
Attending: EMERGENCY MEDICINE | Admitting: INTERNAL MEDICINE
Payer: COMMERCIAL

## 2020-10-20 ENCOUNTER — APPOINTMENT (OUTPATIENT)
Dept: RADIOLOGY | Facility: MEDICAL CENTER | Age: 55
End: 2020-10-20
Attending: EMERGENCY MEDICINE
Payer: COMMERCIAL

## 2020-10-20 DIAGNOSIS — K92.1 GASTROINTESTINAL HEMORRHAGE WITH MELENA: ICD-10-CM

## 2020-10-20 PROBLEM — K92.0 GASTROINTESTINAL HEMORRHAGE WITH HEMATEMESIS: Status: ACTIVE | Noted: 2020-10-20

## 2020-10-20 LAB
ALBUMIN SERPL BCP-MCNC: 4.7 G/DL (ref 3.2–4.9)
ALBUMIN/GLOB SERPL: 2 G/DL
ALP SERPL-CCNC: 123 U/L (ref 30–99)
ALT SERPL-CCNC: 75 U/L (ref 2–50)
ANION GAP SERPL CALC-SCNC: 28 MMOL/L (ref 7–16)
APTT PPP: 32 SEC (ref 24.7–36)
AST SERPL-CCNC: 164 U/L (ref 12–45)
BASOPHILS # BLD AUTO: 0.7 % (ref 0–1.8)
BASOPHILS # BLD: 0.04 K/UL (ref 0–0.12)
BILIRUB SERPL-MCNC: 6.6 MG/DL (ref 0.1–1.5)
BUN SERPL-MCNC: 13 MG/DL (ref 8–22)
CALCIUM SERPL-MCNC: 10.1 MG/DL (ref 8.5–10.5)
CHLORIDE SERPL-SCNC: 95 MMOL/L (ref 96–112)
CO2 SERPL-SCNC: 14 MMOL/L (ref 20–33)
CREAT SERPL-MCNC: 0.74 MG/DL (ref 0.5–1.4)
EOSINOPHIL # BLD AUTO: 0.17 K/UL (ref 0–0.51)
EOSINOPHIL NFR BLD: 3.1 % (ref 0–6.9)
ERYTHROCYTE [DISTWIDTH] IN BLOOD BY AUTOMATED COUNT: 57.5 FL (ref 35.9–50)
GLOBULIN SER CALC-MCNC: 2.4 G/DL (ref 1.9–3.5)
GLUCOSE SERPL-MCNC: 276 MG/DL (ref 65–99)
HCT VFR BLD AUTO: 34.8 % (ref 37–47)
HGB BLD-MCNC: 11.8 G/DL (ref 12–16)
IMM GRANULOCYTES # BLD AUTO: 0.03 K/UL (ref 0–0.11)
IMM GRANULOCYTES NFR BLD AUTO: 0.6 % (ref 0–0.9)
INR PPP: 1.47 (ref 0.87–1.13)
LIPASE SERPL-CCNC: 33 U/L (ref 11–82)
LYMPHOCYTES # BLD AUTO: 0.21 K/UL (ref 1–4.8)
LYMPHOCYTES NFR BLD: 3.9 % (ref 22–41)
MCH RBC QN AUTO: 32.2 PG (ref 27–33)
MCHC RBC AUTO-ENTMCNC: 33.9 G/DL (ref 33.6–35)
MCV RBC AUTO: 94.8 FL (ref 81.4–97.8)
MONOCYTES # BLD AUTO: 0.62 K/UL (ref 0–0.85)
MONOCYTES NFR BLD AUTO: 11.4 % (ref 0–13.4)
NEUTROPHILS # BLD AUTO: 4.37 K/UL (ref 2–7.15)
NEUTROPHILS NFR BLD: 80.3 % (ref 44–72)
NRBC # BLD AUTO: 0 K/UL
NRBC BLD-RTO: 0 /100 WBC
PLATELET # BLD AUTO: 72 K/UL (ref 164–446)
PMV BLD AUTO: 11.4 FL (ref 9–12.9)
POTASSIUM SERPL-SCNC: 3.5 MMOL/L (ref 3.6–5.5)
PROT SERPL-MCNC: 7.1 G/DL (ref 6–8.2)
PROTHROMBIN TIME: 18.3 SEC (ref 12–14.6)
RBC # BLD AUTO: 3.67 M/UL (ref 4.2–5.4)
SODIUM SERPL-SCNC: 137 MMOL/L (ref 135–145)
WBC # BLD AUTO: 5.4 K/UL (ref 4.8–10.8)

## 2020-10-20 PROCEDURE — 99220 PR INITIAL OBSERVATION CARE,LEVL III: CPT | Performed by: INTERNAL MEDICINE

## 2020-10-20 PROCEDURE — C9803 HOPD COVID-19 SPEC COLLECT: HCPCS | Performed by: EMERGENCY MEDICINE

## 2020-10-20 PROCEDURE — 36415 COLL VENOUS BLD VENIPUNCTURE: CPT

## 2020-10-20 PROCEDURE — G0378 HOSPITAL OBSERVATION PER HR: HCPCS

## 2020-10-20 PROCEDURE — 83690 ASSAY OF LIPASE: CPT

## 2020-10-20 PROCEDURE — 96375 TX/PRO/DX INJ NEW DRUG ADDON: CPT

## 2020-10-20 PROCEDURE — 85610 PROTHROMBIN TIME: CPT

## 2020-10-20 PROCEDURE — 700105 HCHG RX REV CODE 258: Performed by: EMERGENCY MEDICINE

## 2020-10-20 PROCEDURE — 85025 COMPLETE CBC W/AUTO DIFF WBC: CPT

## 2020-10-20 PROCEDURE — 85730 THROMBOPLASTIN TIME PARTIAL: CPT

## 2020-10-20 PROCEDURE — 700111 HCHG RX REV CODE 636 W/ 250 OVERRIDE (IP): Performed by: EMERGENCY MEDICINE

## 2020-10-20 PROCEDURE — 71045 X-RAY EXAM CHEST 1 VIEW: CPT

## 2020-10-20 PROCEDURE — 99285 EMERGENCY DEPT VISIT HI MDM: CPT

## 2020-10-20 PROCEDURE — 80053 COMPREHEN METABOLIC PANEL: CPT

## 2020-10-20 RX ORDER — FOLIC ACID 1 MG/1
1 TABLET ORAL
Status: DISCONTINUED | OUTPATIENT
Start: 2020-10-20 | End: 2020-10-21 | Stop reason: HOSPADM

## 2020-10-20 RX ORDER — LORAZEPAM 2 MG/ML
1 INJECTION INTRAMUSCULAR
Status: DISCONTINUED | OUTPATIENT
Start: 2020-10-20 | End: 2020-10-21 | Stop reason: HOSPADM

## 2020-10-20 RX ORDER — LORAZEPAM 2 MG/ML
0.5 INJECTION INTRAMUSCULAR EVERY 4 HOURS PRN
Status: DISCONTINUED | OUTPATIENT
Start: 2020-10-20 | End: 2020-10-21 | Stop reason: HOSPADM

## 2020-10-20 RX ORDER — LORAZEPAM 2 MG/1
4 TABLET ORAL
Status: DISCONTINUED | OUTPATIENT
Start: 2020-10-20 | End: 2020-10-21 | Stop reason: HOSPADM

## 2020-10-20 RX ORDER — PROMETHAZINE HYDROCHLORIDE 25 MG/1
12.5-25 TABLET ORAL EVERY 4 HOURS PRN
Status: DISCONTINUED | OUTPATIENT
Start: 2020-10-20 | End: 2020-10-21 | Stop reason: HOSPADM

## 2020-10-20 RX ORDER — SODIUM CHLORIDE 9 MG/ML
INJECTION, SOLUTION INTRAVENOUS CONTINUOUS
Status: DISCONTINUED | OUTPATIENT
Start: 2020-10-20 | End: 2020-10-21

## 2020-10-20 RX ORDER — ONDANSETRON 4 MG/1
4 TABLET, ORALLY DISINTEGRATING ORAL EVERY 4 HOURS PRN
Status: DISCONTINUED | OUTPATIENT
Start: 2020-10-20 | End: 2020-10-21 | Stop reason: HOSPADM

## 2020-10-20 RX ORDER — LORAZEPAM 2 MG/ML
2 INJECTION INTRAMUSCULAR
Status: DISCONTINUED | OUTPATIENT
Start: 2020-10-20 | End: 2020-10-21 | Stop reason: HOSPADM

## 2020-10-20 RX ORDER — LORAZEPAM 2 MG/ML
1.5 INJECTION INTRAMUSCULAR
Status: DISCONTINUED | OUTPATIENT
Start: 2020-10-20 | End: 2020-10-21 | Stop reason: HOSPADM

## 2020-10-20 RX ORDER — LORAZEPAM 2 MG/1
2 TABLET ORAL
Status: DISCONTINUED | OUTPATIENT
Start: 2020-10-20 | End: 2020-10-21 | Stop reason: HOSPADM

## 2020-10-20 RX ORDER — PANTOPRAZOLE SODIUM 40 MG/10ML
40 INJECTION, POWDER, LYOPHILIZED, FOR SOLUTION INTRAVENOUS 2 TIMES DAILY
Status: DISCONTINUED | OUTPATIENT
Start: 2020-10-20 | End: 2020-10-21 | Stop reason: HOSPADM

## 2020-10-20 RX ORDER — ONDANSETRON 2 MG/ML
4 INJECTION INTRAMUSCULAR; INTRAVENOUS EVERY 4 HOURS PRN
Status: DISCONTINUED | OUTPATIENT
Start: 2020-10-20 | End: 2020-10-21 | Stop reason: HOSPADM

## 2020-10-20 RX ORDER — PROMETHAZINE HYDROCHLORIDE 25 MG/1
12.5-25 SUPPOSITORY RECTAL EVERY 4 HOURS PRN
Status: DISCONTINUED | OUTPATIENT
Start: 2020-10-20 | End: 2020-10-21 | Stop reason: HOSPADM

## 2020-10-20 RX ORDER — LEVETIRACETAM 500 MG/1
500 TABLET ORAL 2 TIMES DAILY
Status: DISCONTINUED | OUTPATIENT
Start: 2020-10-21 | End: 2020-10-21

## 2020-10-20 RX ORDER — ONDANSETRON 2 MG/ML
4 INJECTION INTRAMUSCULAR; INTRAVENOUS ONCE
Status: COMPLETED | OUTPATIENT
Start: 2020-10-20 | End: 2020-10-20

## 2020-10-20 RX ORDER — LORAZEPAM 0.5 MG/1
0.5 TABLET ORAL EVERY 4 HOURS PRN
Status: DISCONTINUED | OUTPATIENT
Start: 2020-10-20 | End: 2020-10-21 | Stop reason: HOSPADM

## 2020-10-20 RX ORDER — LORAZEPAM 1 MG/1
1 TABLET ORAL EVERY 4 HOURS PRN
Status: DISCONTINUED | OUTPATIENT
Start: 2020-10-20 | End: 2020-10-21 | Stop reason: HOSPADM

## 2020-10-20 RX ORDER — THIAMINE MONONITRATE (VIT B1) 100 MG
100 TABLET ORAL DAILY
Status: DISCONTINUED | OUTPATIENT
Start: 2020-10-21 | End: 2020-10-21 | Stop reason: HOSPADM

## 2020-10-20 RX ORDER — SODIUM CHLORIDE 9 MG/ML
1000 INJECTION, SOLUTION INTRAVENOUS ONCE
Status: COMPLETED | OUTPATIENT
Start: 2020-10-20 | End: 2020-10-21

## 2020-10-20 RX ORDER — SODIUM CHLORIDE 9 MG/ML
1000 INJECTION, SOLUTION INTRAVENOUS ONCE
Status: COMPLETED | OUTPATIENT
Start: 2020-10-20 | End: 2020-10-20

## 2020-10-20 RX ORDER — PROCHLORPERAZINE EDISYLATE 5 MG/ML
5-10 INJECTION INTRAMUSCULAR; INTRAVENOUS EVERY 4 HOURS PRN
Status: DISCONTINUED | OUTPATIENT
Start: 2020-10-20 | End: 2020-10-21 | Stop reason: HOSPADM

## 2020-10-20 RX ADMIN — SODIUM CHLORIDE 1000 ML: 9 INJECTION, SOLUTION INTRAVENOUS at 21:52

## 2020-10-20 RX ADMIN — ONDANSETRON 4 MG: 2 INJECTION INTRAMUSCULAR; INTRAVENOUS at 21:52

## 2020-10-20 RX ADMIN — SODIUM CHLORIDE 1000 ML: 9 INJECTION, SOLUTION INTRAVENOUS at 23:04

## 2020-10-20 SDOH — HEALTH STABILITY: MENTAL HEALTH: HOW OFTEN DO YOU HAVE 6 OR MORE DRINKS ON ONE OCCASION?: PATIENT DECLINED

## 2020-10-20 SDOH — HEALTH STABILITY: MENTAL HEALTH: HOW MANY STANDARD DRINKS CONTAINING ALCOHOL DO YOU HAVE ON A TYPICAL DAY?: 5 OR 6

## 2020-10-20 SDOH — HEALTH STABILITY: MENTAL HEALTH: HOW OFTEN DO YOU HAVE A DRINK CONTAINING ALCOHOL?: 2-3 TIMES A WEEK

## 2020-10-20 ASSESSMENT — ENCOUNTER SYMPTOMS
ROS GI COMMENTS: HEMATEMESIS
NAUSEA: 1
MUSCULOSKELETAL NEGATIVE: 1
RESPIRATORY NEGATIVE: 1
TREMORS: 1
CARDIOVASCULAR NEGATIVE: 1
CONSTITUTIONAL NEGATIVE: 1
EYES NEGATIVE: 1
DIARRHEA: 1
VOMITING: 1

## 2020-10-20 ASSESSMENT — FIBROSIS 4 INDEX: FIB4 SCORE: 11.4

## 2020-10-20 ASSESSMENT — LIFESTYLE VARIABLES: SUBSTANCE_ABUSE: 1

## 2020-10-21 VITALS
DIASTOLIC BLOOD PRESSURE: 66 MMHG | RESPIRATION RATE: 18 BRPM | HEART RATE: 95 BPM | SYSTOLIC BLOOD PRESSURE: 113 MMHG | WEIGHT: 154.32 LBS | TEMPERATURE: 98 F | HEIGHT: 68 IN | OXYGEN SATURATION: 93 % | BODY MASS INDEX: 23.39 KG/M2

## 2020-10-21 LAB
ANION GAP SERPL CALC-SCNC: 13 MMOL/L (ref 7–16)
BASOPHILS # BLD AUTO: 0.4 % (ref 0–1.8)
BASOPHILS # BLD: 0.02 K/UL (ref 0–0.12)
BUN SERPL-MCNC: 18 MG/DL (ref 8–22)
CALCIUM SERPL-MCNC: 9.2 MG/DL (ref 8.5–10.5)
CHLORIDE SERPL-SCNC: 103 MMOL/L (ref 96–112)
CO2 SERPL-SCNC: 22 MMOL/L (ref 20–33)
COVID ORDER STATUS COVID19: NORMAL
CREAT SERPL-MCNC: 0.6 MG/DL (ref 0.5–1.4)
EOSINOPHIL # BLD AUTO: 0 K/UL (ref 0–0.51)
EOSINOPHIL NFR BLD: 0 % (ref 0–6.9)
ERYTHROCYTE [DISTWIDTH] IN BLOOD BY AUTOMATED COUNT: 58.2 FL (ref 35.9–50)
ERYTHROCYTE [DISTWIDTH] IN BLOOD BY AUTOMATED COUNT: 60.5 FL (ref 35.9–50)
GLUCOSE SERPL-MCNC: 114 MG/DL (ref 65–99)
HCT VFR BLD AUTO: 29.5 % (ref 37–47)
HCT VFR BLD AUTO: 29.9 % (ref 37–47)
HGB BLD-MCNC: 10.1 G/DL (ref 12–16)
HGB BLD-MCNC: 9.7 G/DL (ref 12–16)
IMM GRANULOCYTES # BLD AUTO: 0.02 K/UL (ref 0–0.11)
IMM GRANULOCYTES NFR BLD AUTO: 0.4 % (ref 0–0.9)
LYMPHOCYTES # BLD AUTO: 0.73 K/UL (ref 1–4.8)
LYMPHOCYTES NFR BLD: 15.6 % (ref 22–41)
MAGNESIUM SERPL-MCNC: 1.9 MG/DL (ref 1.5–2.5)
MCH RBC QN AUTO: 32.2 PG (ref 27–33)
MCH RBC QN AUTO: 32.5 PG (ref 27–33)
MCHC RBC AUTO-ENTMCNC: 32.9 G/DL (ref 33.6–35)
MCHC RBC AUTO-ENTMCNC: 33.8 G/DL (ref 33.6–35)
MCV RBC AUTO: 96.1 FL (ref 81.4–97.8)
MCV RBC AUTO: 98 FL (ref 81.4–97.8)
MONOCYTES # BLD AUTO: 0.65 K/UL (ref 0–0.85)
MONOCYTES NFR BLD AUTO: 13.9 % (ref 0–13.4)
NEUTROPHILS # BLD AUTO: 3.27 K/UL (ref 2–7.15)
NEUTROPHILS NFR BLD: 69.7 % (ref 44–72)
NRBC # BLD AUTO: 0 K/UL
NRBC BLD-RTO: 0 /100 WBC
PHOSPHATE SERPL-MCNC: 0.9 MG/DL (ref 2.5–4.5)
PLATELET # BLD AUTO: 47 K/UL (ref 164–446)
PLATELET # BLD AUTO: 53 K/UL (ref 164–446)
PMV BLD AUTO: 11.1 FL (ref 9–12.9)
PMV BLD AUTO: 11.6 FL (ref 9–12.9)
POTASSIUM SERPL-SCNC: 3.4 MMOL/L (ref 3.6–5.5)
RBC # BLD AUTO: 3.01 M/UL (ref 4.2–5.4)
RBC # BLD AUTO: 3.11 M/UL (ref 4.2–5.4)
SARS-COV-2 RNA RESP QL NAA+PROBE: NOTDETECTED
SODIUM SERPL-SCNC: 138 MMOL/L (ref 135–145)
SPECIMEN SOURCE: NORMAL
WBC # BLD AUTO: 4.5 K/UL (ref 4.8–10.8)
WBC # BLD AUTO: 4.7 K/UL (ref 4.8–10.8)

## 2020-10-21 PROCEDURE — 96375 TX/PRO/DX INJ NEW DRUG ADDON: CPT

## 2020-10-21 PROCEDURE — 80048 BASIC METABOLIC PNL TOTAL CA: CPT

## 2020-10-21 PROCEDURE — 700101 HCHG RX REV CODE 250: Performed by: INTERNAL MEDICINE

## 2020-10-21 PROCEDURE — C9113 INJ PANTOPRAZOLE SODIUM, VIA: HCPCS | Performed by: INTERNAL MEDICINE

## 2020-10-21 PROCEDURE — 96376 TX/PRO/DX INJ SAME DRUG ADON: CPT

## 2020-10-21 PROCEDURE — U0003 INFECTIOUS AGENT DETECTION BY NUCLEIC ACID (DNA OR RNA); SEVERE ACUTE RESPIRATORY SYNDROME CORONAVIRUS 2 (SARS-COV-2) (CORONAVIRUS DISEASE [COVID-19]), AMPLIFIED PROBE TECHNIQUE, MAKING USE OF HIGH THROUGHPUT TECHNOLOGIES AS DESCRIBED BY CMS-2020-01-R: HCPCS

## 2020-10-21 PROCEDURE — 85027 COMPLETE CBC AUTOMATED: CPT

## 2020-10-21 PROCEDURE — 99217 PR OBSERVATION CARE DISCHARGE: CPT | Performed by: INTERNAL MEDICINE

## 2020-10-21 PROCEDURE — 700111 HCHG RX REV CODE 636 W/ 250 OVERRIDE (IP): Performed by: INTERNAL MEDICINE

## 2020-10-21 PROCEDURE — 700111 HCHG RX REV CODE 636 W/ 250 OVERRIDE (IP): Performed by: HOSPITALIST

## 2020-10-21 PROCEDURE — G0378 HOSPITAL OBSERVATION PER HR: HCPCS

## 2020-10-21 PROCEDURE — 84100 ASSAY OF PHOSPHORUS: CPT

## 2020-10-21 PROCEDURE — 36415 COLL VENOUS BLD VENIPUNCTURE: CPT

## 2020-10-21 PROCEDURE — 96366 THER/PROPH/DIAG IV INF ADDON: CPT

## 2020-10-21 PROCEDURE — 96365 THER/PROPH/DIAG IV INF INIT: CPT

## 2020-10-21 PROCEDURE — 85025 COMPLETE CBC W/AUTO DIFF WBC: CPT

## 2020-10-21 PROCEDURE — 83735 ASSAY OF MAGNESIUM: CPT

## 2020-10-21 PROCEDURE — 700102 HCHG RX REV CODE 250 W/ 637 OVERRIDE(OP): Performed by: HOSPITALIST

## 2020-10-21 PROCEDURE — A9270 NON-COVERED ITEM OR SERVICE: HCPCS | Performed by: HOSPITALIST

## 2020-10-21 RX ORDER — SODIUM CHLORIDE AND POTASSIUM CHLORIDE 300; 900 MG/100ML; MG/100ML
INJECTION, SOLUTION INTRAVENOUS CONTINUOUS
Status: DISCONTINUED | OUTPATIENT
Start: 2020-10-21 | End: 2020-10-21

## 2020-10-21 RX ORDER — LEVETIRACETAM 5 MG/ML
500 INJECTION INTRAVASCULAR EVERY 12 HOURS
Status: DISCONTINUED | OUTPATIENT
Start: 2020-10-21 | End: 2020-10-21 | Stop reason: HOSPADM

## 2020-10-21 RX ORDER — MAGNESIUM SULFATE HEPTAHYDRATE 40 MG/ML
2 INJECTION, SOLUTION INTRAVENOUS ONCE
Status: COMPLETED | OUTPATIENT
Start: 2020-10-21 | End: 2020-10-21

## 2020-10-21 RX ORDER — PANTOPRAZOLE SODIUM 40 MG/1
40 TABLET, DELAYED RELEASE ORAL 2 TIMES DAILY
Qty: 60 TAB | Refills: 0 | Status: SHIPPED | OUTPATIENT
Start: 2020-10-21

## 2020-10-21 RX ADMIN — ONDANSETRON 4 MG: 4 TABLET, ORALLY DISINTEGRATING ORAL at 11:09

## 2020-10-21 RX ADMIN — ONDANSETRON 4 MG: 2 INJECTION INTRAMUSCULAR; INTRAVENOUS at 01:59

## 2020-10-21 RX ADMIN — POTASSIUM CHLORIDE AND SODIUM CHLORIDE: 900; 300 INJECTION, SOLUTION INTRAVENOUS at 02:34

## 2020-10-21 RX ADMIN — PANTOPRAZOLE SODIUM 40 MG: 40 INJECTION, POWDER, LYOPHILIZED, FOR SOLUTION INTRAVENOUS at 06:02

## 2020-10-21 RX ADMIN — PANTOPRAZOLE SODIUM 40 MG: 40 INJECTION, POWDER, LYOPHILIZED, FOR SOLUTION INTRAVENOUS at 01:53

## 2020-10-21 RX ADMIN — LEVETIRACETAM INJECTION 500 MG: 5 INJECTION INTRAVENOUS at 06:37

## 2020-10-21 RX ADMIN — DIBASIC SODIUM PHOSPHATE, MONOBASIC POTASSIUM PHOSPHATE AND MONOBASIC SODIUM PHOSPHATE 250 MG: 852; 155; 130 TABLET ORAL at 10:32

## 2020-10-21 RX ADMIN — MAGNESIUM SULFATE 2 G: 2 INJECTION INTRAVENOUS at 10:32

## 2020-10-21 ASSESSMENT — COGNITIVE AND FUNCTIONAL STATUS - GENERAL
DAILY ACTIVITIY SCORE: 24
MOBILITY SCORE: 24
SUGGESTED CMS G CODE MODIFIER MOBILITY: CH
SUGGESTED CMS G CODE MODIFIER DAILY ACTIVITY: CH

## 2020-10-21 ASSESSMENT — LIFESTYLE VARIABLES
TREMOR: NO TREMOR
NAUSEA AND VOMITING: NO NAUSEA AND NO VOMITING
VISUAL DISTURBANCES: NOT PRESENT
ANXIETY: NO ANXIETY (AT EASE)
PAROXYSMAL SWEATS: NO SWEAT VISIBLE
AUDITORY DISTURBANCES: NOT PRESENT
AGITATION: SOMEWHAT MORE THAN NORMAL ACTIVITY
TOTAL SCORE: 3
ORIENTATION AND CLOUDING OF SENSORIUM: ORIENTED AND CAN DO SERIAL ADDITIONS
HEADACHE, FULLNESS IN HEAD: MILD

## 2020-10-21 ASSESSMENT — PAIN DESCRIPTION - PAIN TYPE: TYPE: ACUTE PAIN

## 2020-10-21 ASSESSMENT — FIBROSIS 4 INDEX: FIB4 SCORE: 18.94

## 2020-10-21 NOTE — ASSESSMENT & PLAN NOTE
-Monitor CBC q6 hours  -Monitor BP and HR  -Monitor platelets  -GI consult in AM. Will defer to them to scope patient in hospital or do it as outpatient  -Protonix 40mg IV BID
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
## 2020-10-21 NOTE — DISCHARGE INSTRUCTIONS
Discharge Instructions    Discharged to home by car with relative. Discharged via wheelchair, hospital escort: Yes.  Special equipment needed: Not Applicable    Be sure to schedule a follow-up appointment with your primary care doctor or any specialists as instructed.     Discharge Plan:   Diet Plan: Discussed  Activity Level: Discussed  Confirmed Follow up Appointment: Patient to Call and Schedule Appointment  Confirmed Symptoms Management: Discussed  Medication Reconciliation Updated: Yes  Influenza Vaccine Indication: Patient Refuses    I understand that a diet low in cholesterol, fat, and sodium is recommended for good health. Unless I have been given specific instructions below for another diet, I accept this instruction as my diet prescription.       Special Instructions: None    · Is patient discharged on Warfarin / Coumadin?   No     Depression / Suicide Risk    As you are discharged from this [facility] facility, it is important to learn how to keep safe from harming yourself.    Recognize the warning signs:  · Abrupt changes in personality, positive or negative- including increase in energy   · Giving away possessions  · Change in eating patterns- significant weight changes-  positive or negative  · Change in sleeping patterns- unable to sleep or sleeping all the time   · Unwillingness or inability to communicate  · Depression  · Unusual sadness, discouragement and loneliness  · Talk of wanting to die  · Neglect of personal appearance   · Rebelliousness- reckless behavior  · Withdrawal from people/activities they love  · Confusion- inability to concentrate     If you or a loved one observes any of these behaviors or has concerns about self-harm, here's what you can do:  · Talk about it- your feelings and reasons for harming yourself  · Remove any means that you might use to hurt yourself (examples: pills, rope, extension cords, firearm)  · Get professional help from the community (Mental Health,  Substance Abuse, psychological counseling)  · Do not be alone:Call your Safe Contact- someone whom you trust who will be there for you.  · Call your local CRISIS HOTLINE 616-4931 or 974-973-5163  · Call your local Children's Mobile Crisis Response Team [place] (457) 223-1590 or www.[employer]  · Call the toll free National Suicide Prevention Hotlines   · National Suicide Prevention Lifeline 400-094-IZSF (9964)  · One[employer] Line Network 800-SUICIDE (223-8675)        Upper Endoscopy, Adult  Upper endoscopy is a procedure to look inside the upper GI (gastrointestinal) tract. The upper GI tract is made up of:  · The part of the body that moves food from your mouth to your stomach (esophagus).  · The stomach.  · The first part of your small intestine (duodenum).  This procedure is also called esophagogastroduodenoscopy (EGD) or gastroscopy. In this procedure, your health care provider passes a thin, flexible tube (endoscope) through your mouth and down your esophagus into your stomach. A small camera is attached to the end of the tube. Images from the camera appear on a monitor in the exam room. During this procedure, your health care provider may also remove a small piece of tissue to be sent to a lab and examined under a microscope (biopsy).  Your health care provider may do an upper endoscopy to diagnose cancers of the upper GI tract. You may also have this procedure to find the cause of other conditions, such as:  · Stomach pain.  · Heartburn.  · Pain or problems when swallowing.  · Nausea and vomiting.  · Stomach bleeding.  · Stomach ulcers.  Tell a health care provider about:  · Any allergies you have.  · All medicines you are taking, including vitamins, herbs, eye drops, creams, and over-the-counter medicines.  · Any problems you or family members have had with anesthetic medicines.  · Any blood disorders you have.  · Any surgeries you have had.  · Any medical conditions you have.  · Whether you  are pregnant or may be pregnant.  What are the risks?  Generally, this is a safe procedure. However, problems may occur, including:  · Infection.  · Bleeding.  · Allergic reactions to medicines.  · A tear or hole (perforation) in the esophagus, stomach, or duodenum.  What happens before the procedure?  Staying hydrated  Follow instructions from your health care provider about hydration, which may include:  · Up to 2 hours before the procedure - you may continue to drink clear liquids, such as water, clear fruit juice, black coffee, and plain tea.    Eating and drinking restrictions  Follow instructions from your health care provider about eating and drinking, which may include:  · 8 hours before the procedure - stop eating heavy meals or foods, such as meat, fried foods, or fatty foods.  · 6 hours before the procedure - stop eating light meals or foods, such as toast or cereal.  · 6 hours before the procedure - stop drinking milk or drinks that contain milk.  · 2 hours before the procedure - stop drinking clear liquids.  Medicines  Ask your health care provider about:  · Changing or stopping your regular medicines. This is especially important if you are taking diabetes medicines or blood thinners.  · Taking medicines such as aspirin and ibuprofen. These medicines can thin your blood. Do not take these medicines unless your health care provider tells you to take them.  · Taking over-the-counter medicines, vitamins, herbs, and supplements.  General instructions  · Plan to have someone take you home from the hospital or clinic.  · If you will be going home right after the procedure, plan to have someone with you for 24 hours.  · Ask your health care provider what steps will be taken to help prevent infection.  What happens during the procedure?    · An IV will be inserted into one of your veins.  · You may be given one or more of the following:  ? A medicine to help you relax (sedative).  ? A medicine to numb the  throat (local anesthetic).  · You will lie on your left side on an exam table.  · Your health care provider will pass the endoscope through your mouth and down your esophagus.  · Your health care provider will use the scope to check the inside of your esophagus, stomach, and duodenum. Biopsies may be taken.  · The endoscope will be removed.  The procedure may vary among health care providers and hospitals.  What happens after the procedure?  · Your blood pressure, heart rate, breathing rate, and blood oxygen level will be monitored until you leave the hospital or clinic.  · Do not drive for 24 hours if you were given a sedative during your procedure.  · When your throat is no longer numb, you may be given some fluids to drink.  · It is up to you to get the results of your procedure. Ask your health care provider, or the department that is doing the procedure, when your results will be ready.  Summary  · Upper endoscopy is a procedure to look inside the upper GI tract.  · During the procedure, an IV will be inserted into one of your veins. You may be given a medicine to help you relax.  · A medicine will be used to numb your throat.  · The endoscope will be passed through your mouth and down your esophagus.  This information is not intended to replace advice given to you by your health care provider. Make sure you discuss any questions you have with your health care provider.  Document Released: 12/15/2001 Document Revised: 06/12/2019 Document Reviewed: 05/20/2019  Elsevier Patient Education © 2020 [employer]      Gastrointestinal Bleeding  Gastrointestinal (GI) bleeding is bleeding somewhere along the path that food travels through the body (digestive tract). This path is anywhere between the mouth and the opening of the butt (anus). You may have blood in your poop (stool) or have black poop. If you throw up (vomit), there may be blood in it.  This condition can be mild, serious, or even life-threatening. If you  have a lot of bleeding, you may need to stay in the hospital.  What are the causes?  This condition may be caused by:  · Irritation and swelling of the esophagus (esophagitis). The esophagus is part of the body that moves food from your mouth to your stomach.  · Swollen veins in the butt (hemorrhoids).  · Areas of painful tearing in the opening of the butt (anal fissures). These are often caused by passing hard poop.  · Pouches that form on the colon over time (diverticulosis).  · Irritation and swelling (diverticulitis) in areas where pouches have formed on the colon.  · Growths (polyps) or cancer. Colon cancer often starts out as growths that are not cancer.  · Irritation of the stomach lining (gastritis).  · Sores (ulcers) in the stomach.  What increases the risk?  You are more likely to develop this condition if you:  · Have a certain type of infection in your stomach (Helicobacter pylori infection).  · Take certain medicines.  · Smoke.  · Drink alcohol.  What are the signs or symptoms?  Common symptoms of this condition include:  · Throwing up (vomiting) material that has bright red blood in it. It may look like coffee grounds.  · Changes in your poop. The poop may:  ? Have red blood in it.  ? Be black, look like tar, and smell stronger than normal.  ? Be red.  · Pain or cramping in the belly (abdomen).  How is this treated?  Treatment for this condition depends on the cause of the bleeding. For example:  · Sometimes, the bleeding can be stopped during a procedure that is done to find the problem (endoscopy or colonoscopy).  · Medicines can be used to:  ? Help control irritation, swelling, or infection.  ? Reduce acid in your stomach.  · Certain problems can be treated with:  ? Creams.  ? Medicines that are put in the butt (suppositories).  ? Warm baths.  · Surgery is sometimes needed.  · If you lose a lot of blood, you may need a blood transfusion.  If bleeding is mild, you may be allowed to go home. If there  is a lot of bleeding, you will need to stay in the hospital.  Follow these instructions at home:    · Take over-the-counter and prescription medicines only as told by your doctor.  · Eat foods that have a lot of fiber in them. These foods include beans, whole grains, and fresh fruits and vegetables. You can also try eating 1-3 prunes each day.  · Drink enough fluid to keep your pee (urine) pale yellow.  · Keep all follow-up visits as told by your doctor. This is important.  Contact a doctor if:  · Your symptoms do not get better.  Get help right away if:  · Your bleeding does not stop.  · You feel dizzy or you pass out (faint).  · You feel weak.  · You have very bad cramps in your back or belly.  · You pass large clumps of blood (clots) in your poop.  · Your symptoms are getting worse.  · You have chest pain or fast heartbeats.  Summary  · GI bleeding is bleeding somewhere along the path that food travels through the body (digestive tract).  · This bleeding can be caused by many things. Treatment depends on the cause of the bleeding.  · Take medicines only as told by your doctor.  · Keep all follow-up visits as told by your doctor. This is important.  This information is not intended to replace advice given to you by your health care provider. Make sure you discuss any questions you have with your health care provider.  Document Released: 09/26/2009 Document Revised: 07/31/2019 Document Reviewed: 07/31/2019  [employer] Patient Education © 2020 [employer]    Pantoprazole tablets  What is this medicine?  PANTOPRAZOLE (pan TOE pra zole) prevents the production of acid in the stomach. It is used to treat gastroesophageal reflux disease (GERD), inflammation of the esophagus, and Zollinger-Herrera syndrome.  This medicine may be used for other purposes; ask your health care provider or pharmacist if you have questions.  COMMON BRAND NAME(S): Protonix  What should I tell my health care provider before I take this  medicine?  They need to know if you have any of these conditions:  · liver disease  · low levels of magnesium in the blood  · lupus  · an unusual or allergic reaction to omeprazole, lansoprazole, pantoprazole, rabeprazole, other medicines, foods, dyes, or preservatives  · pregnant or trying to get pregnant  · breast-feeding  How should I use this medicine?  Take this medicine by mouth. Swallow the tablets whole with a drink of water. Follow the directions on the prescription label. Do not crush, break, or chew. Take your medicine at regular intervals. Do not take your medicine more often than directed.  Talk to your pediatrician regarding the use of this medicine in children. While this drug may be prescribed for children as young as 5 years for selected conditions, precautions do apply.  Overdosage: If you think you have taken too much of this medicine contact a poison control center or emergency room at once.  NOTE: This medicine is only for you. Do not share this medicine with others.  What if I miss a dose?  If you miss a dose, take it as soon as you can. If it is almost time for your next dose, take only that dose. Do not take double or extra doses.  What may interact with this medicine?  Do not take this medicine with any of the following medications:  · atazanavir  · nelfinavir  This medicine may also interact with the following medications:  · ampicillin  · delavirdine  · erlotinib  · iron salts  · medicines for fungal infections like ketoconazole, itraconazole and voriconazole  · methotrexate  · mycophenolate mofetil  · warfarin  This list may not describe all possible interactions. Give your health care provider a list of all the medicines, herbs, non-prescription drugs, or dietary supplements you use. Also tell them if you smoke, drink alcohol, or use illegal drugs. Some items may interact with your medicine.  What should I watch for while using this medicine?  It can take several days before your stomach  pain gets better. Check with your doctor or health care provider if your condition does not start to get better, or if it gets worse.  This medicine may cause serious skin reactions. They can happen weeks to months after starting the medicine. Contact your health care provider right away if you notice fevers or flu-like symptoms with a rash. The rash may be red or purple and then turn into blisters or peeling of the skin. Or, you might notice a red rash with swelling of the face, lips or lymph nodes in your neck or under your arms.  You may need blood work done while you are taking this medicine.  This medicine may cause a decrease in vitamin B12. You should make sure that you get enough vitamin B12 while you are taking this medicine. Discuss the foods you eat and the vitamins you take with your health care provider.  What side effects may I notice from receiving this medicine?  Side effects that you should report to your doctor or health care professional as soon as possible:  ·   allergic reactions like skin rash, itching or hives, swelling of the face, lips, or tongue  ·   bone, muscle or joint pain  ·   breathing problems  ·   chest pain or chest tightness  ·   dark yellow or brown urine  ·   dizziness  ·   fast, irregular heartbeat  ·   feeling faint or lightheaded  ·   fever or sore throat  ·   muscle spasm  ·   palpitations  ·   rash on cheeks or arms that gets worse in the sun  ·   redness, blistering, peeling or loosening of the skin, including inside the mouth  ·   seizures  · stomach polyps  ·   tremors  ·   unusual bleeding or bruising  ·   unusually weak or tired  ·   yellowing of the eyes or skin  Side effects that usually do not require medical attention (report to your doctor or health care professional if they continue or are bothersome):  ·   constipation  ·   diarrhea  ·   dry mouth  ·   headache  ·   nausea  This list may not describe all possible side effects. Call your doctor for medical advice  about side effects. You may report side effects to FDA at 7-535-EQC-1805.  Where should I keep my medicine?  Keep out of the reach of children.  Store at room temperature between 15 and 30 degrees C (59 and 86 degrees F). Protect from light and moisture. Throw away any unused medicine after the expiration date.  NOTE: This sheet is a summary. It may not cover all possible information. If you have questions about this medicine, talk to your doctor, pharmacist, or health care provider.  © 2020 Elsevier[employer] (2020-03-27 13:18:32)
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
## 2020-10-21 NOTE — PROGRESS NOTES
2 RN skin check complete with Linsey STRANGE.   Devices in place tele box.  Skin assessed under devices CDI.  Confirmed pressure ulcers found on N/A.  New potential pressure ulcers noted on N/A. Wound consult placed N/A.  The following interventions in place: pillows for support/positioning.

## 2020-10-21 NOTE — PROGRESS NOTES
Patient to T7 unit, pt care assumed, VSS, pt assessment complete. Pt AAOx4, with complaint of mild headache at this time. No signs of acute distress noted at this time. Plan of care discussed with pt and verbalizes no questions. Ambulated from gurney to bed without difficulty Pt denies any additional needs at this time. Bed locked/in lowest position, call light within reach, hourly rounding initiated.

## 2020-10-21 NOTE — ED NOTES
Attending Hospitalist today is Dr Zavaleta starting at 0700. Please call this Physician for orders, updates and questions.

## 2020-10-21 NOTE — PROGRESS NOTES
Discharge instructions given to patient at bedside, verbalizes understanding and states plans for follow-up with PCP and call to schedule appointment with GI as recommended. Individualized instruction and discharge information provided on new and home medication review, post-discharge activity level, smoking/etoh cessation and worsening of symptoms needing follow-up care. Telemetry monitor/IV cathlon removed. All belongings accounted for, all questions answered at this time. Patient discharged to home with relative.

## 2020-10-21 NOTE — ED NOTES
Assumed care, report received.  Pt ambulatory to bathroom and back with steady gait.  Oriented to room.  Pt expresses hunger, advised of strict NPO order at this time.

## 2020-10-21 NOTE — ED NOTES
Report given to Lucina STRANGE and patient being transferred in [facility] to yellow 57. Pt awake, alert, oriented at this time.  Scheduled for repeat cbc at 1130 and Lucina made aware. Pt awaiting admit to the hospital.

## 2020-10-21 NOTE — PROGRESS NOTES
[facility] TRIAGE OFFICER ER REPORT  Consult/Admission requested by: Dr Acevedo  Chief complaint: Hematemesis, melena  Pertinent history/ER Course: 55-year-old female with history of schizophrenia, seizure disorder, who presented with episodes of emesis with bright red blood, and melena which all started today.  She is only slightly tachycardic, but otherwise blood pressure is stable.  Hemoglobin is 11.8.  She has never seen a gastroenterologist before.  Patient started IV fluids, and hospitalist service admission is requested.  Patient meets admission criterion: Yes..  Recommendations given or work up & consultations requested per triage officer: None  Consultants involved and pertinent input from consultants: None.  Needs GI consult in the morning.  Admission status: To be reviewed by case management and admitting physician.   Admission order placed: To be placed by admitting physician.   Floor requested: Telemetry  Assigned hospitalist: Dr. Solano

## 2020-10-21 NOTE — ED TRIAGE NOTES
Chief Complaint   Patient presents with   • Vomiting     Pt stated she started vomitting blood and having diarhrrea with blood in it this morning. Pt also complains of RUQ ab pain.        Pt is alert and oriented, speaking in full sentences, follows commands and responds appropriately to questions. Resp are even and unlabored. Pt placed in lobby. Pt educated on triage process. Pt encouraged to alert staff for any changes.    Vitals:    10/20/20 2038   BP: 140/94   Pulse: (!) 108   Resp: 20   Temp: 36.4 °C (97.6 °F)   SpO2: 99%
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
## 2020-10-21 NOTE — ED NOTES
Med rec updated and complete. Allergies reviewed.  Pt is not currently taking any medications.      festus Solorio
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
## 2020-12-03 ENCOUNTER — NURSE TRIAGE (OUTPATIENT)
Dept: HEALTH INFORMATION MANAGEMENT | Facility: OTHER | Age: 55
End: 2020-12-03

## 2020-12-03 NOTE — TELEPHONE ENCOUNTER
Pt was transferred to me for an establishing appt with a PCP. Soonest available was 12/9 with Delia Osborn at [facility]. Member declined appt due to the location, not willing to drive to any location other than [place]. Advised that [place] will not have availability until January. Pt declined to schedule and stated she will go to urgent care, declined to book ahead.
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
## 2021-01-22 NOTE — PROGRESS NOTES
Monitor Summary    SR-ST         12HR CC   64 y.o M PMH HLD now s/p ACDF C4-C6, partial corpectomy C6 POD#1.    #pod1  pain control  IS  bowel regimen  PT    #HLD  continue home med    #hyperglycemia  likely 2/2 steroids  monitor, use SSI if fs>150    dvt ppx - per primary team  diet - regular  dispo - pending pt

## 2021-01-29 ENCOUNTER — APPOINTMENT (OUTPATIENT)
Dept: URGENT CARE | Facility: PHYSICIAN GROUP | Age: 56
End: 2021-01-29
Payer: COMMERCIAL

## 2021-01-30 ENCOUNTER — APPOINTMENT (OUTPATIENT)
Dept: RADIOLOGY | Facility: MEDICAL CENTER | Age: 56
DRG: 433 | End: 2021-01-30
Attending: EMERGENCY MEDICINE
Payer: COMMERCIAL

## 2021-01-30 ENCOUNTER — HOSPITAL ENCOUNTER (INPATIENT)
Facility: MEDICAL CENTER | Age: 56
LOS: 3 days | DRG: 433 | End: 2021-02-02
Attending: EMERGENCY MEDICINE | Admitting: STUDENT IN AN ORGANIZED HEALTH CARE EDUCATION/TRAINING PROGRAM
Payer: COMMERCIAL

## 2021-01-30 DIAGNOSIS — R10.9 ACUTE ABDOMINAL PAIN: ICD-10-CM

## 2021-01-30 DIAGNOSIS — K76.82 HEPATIC ENCEPHALOPATHY (HCC): ICD-10-CM

## 2021-01-30 DIAGNOSIS — R18.8 CIRRHOSIS OF LIVER WITH ASCITES, UNSPECIFIED HEPATIC CIRRHOSIS TYPE (HCC): ICD-10-CM

## 2021-01-30 DIAGNOSIS — R10.12 LEFT UPPER QUADRANT ABDOMINAL PAIN: ICD-10-CM

## 2021-01-30 DIAGNOSIS — K74.60 CIRRHOSIS OF LIVER WITH ASCITES, UNSPECIFIED HEPATIC CIRRHOSIS TYPE (HCC): ICD-10-CM

## 2021-01-30 PROBLEM — Z86.2 HISTORY OF AUTOIMMUNE DISEASE: Status: ACTIVE | Noted: 2021-01-30

## 2021-01-30 PROBLEM — R01.1 MURMUR: Status: ACTIVE | Noted: 2021-01-30

## 2021-01-30 PROBLEM — K70.31 ALCOHOLIC CIRRHOSIS OF LIVER WITH ASCITES (HCC): Status: ACTIVE | Noted: 2021-01-30

## 2021-01-30 PROBLEM — R17 TOTAL BILIRUBIN, ELEVATED: Status: ACTIVE | Noted: 2021-01-30

## 2021-01-30 LAB
ALBUMIN SERPL BCP-MCNC: 3.7 G/DL (ref 3.2–4.9)
ALBUMIN/GLOB SERPL: 1.6 G/DL
ALP SERPL-CCNC: 142 U/L (ref 30–99)
ALT SERPL-CCNC: 33 U/L (ref 2–50)
ANION GAP SERPL CALC-SCNC: 10 MMOL/L (ref 7–16)
ANISOCYTOSIS BLD QL SMEAR: ABNORMAL
APPEARANCE FLD: CLEAR
APPEARANCE UR: CLEAR
AST SERPL-CCNC: 82 U/L (ref 12–45)
BASOPHILS # BLD AUTO: 7 % (ref 0–1.8)
BASOPHILS # BLD: 0.44 K/UL (ref 0–0.12)
BILIRUB SERPL-MCNC: 8.2 MG/DL (ref 0.1–1.5)
BILIRUB UR QL STRIP.AUTO: ABNORMAL
BODY FLD TYPE: NORMAL
BUN SERPL-MCNC: 17 MG/DL (ref 8–22)
CALCIUM SERPL-MCNC: 9.4 MG/DL (ref 8.5–10.5)
CHLORIDE SERPL-SCNC: 104 MMOL/L (ref 96–112)
CO2 SERPL-SCNC: 23 MMOL/L (ref 20–33)
COLOR FLD: YELLOW
COLOR UR: ABNORMAL
CREAT SERPL-MCNC: 0.79 MG/DL (ref 0.5–1.4)
EKG IMPRESSION: NORMAL
EOSINOPHIL # BLD AUTO: 0.16 K/UL (ref 0–0.51)
EOSINOPHIL NFR BLD: 2.6 % (ref 0–6.9)
ERYTHROCYTE [DISTWIDTH] IN BLOOD BY AUTOMATED COUNT: 63.5 FL (ref 35.9–50)
GLOBULIN SER CALC-MCNC: 2.3 G/DL (ref 1.9–3.5)
GLUCOSE SERPL-MCNC: 123 MG/DL (ref 65–99)
GLUCOSE UR STRIP.AUTO-MCNC: NEGATIVE MG/DL
HBV CORE AB SERPL QL IA: NONREACTIVE
HBV CORE IGM SER QL: NORMAL
HBV SURFACE AG SER QL: NORMAL
HCG SERPL QL: NEGATIVE
HCT VFR BLD AUTO: 31.6 % (ref 37–47)
HCV AB SER QL: NORMAL
HGB BLD-MCNC: 9 G/DL (ref 12–16)
HISTIOCYTES NFR FLD: 61 %
HYPOCHROMIA BLD QL SMEAR: ABNORMAL
INR PPP: 1.64 (ref 0.87–1.13)
KETONES UR STRIP.AUTO-MCNC: NEGATIVE MG/DL
LEUKOCYTE ESTERASE UR QL STRIP.AUTO: NEGATIVE
LIPASE SERPL-CCNC: 59 U/L (ref 11–82)
LYMPHOCYTES # BLD AUTO: 1.2 K/UL (ref 1–4.8)
LYMPHOCYTES NFR BLD: 19.1 % (ref 22–41)
LYMPHOCYTES NFR FLD: 35 %
MACROCYTES BLD QL SMEAR: ABNORMAL
MAGNESIUM SERPL-MCNC: 2.2 MG/DL (ref 1.5–2.5)
MANUAL DIFF BLD: NORMAL
MCH RBC QN AUTO: 25.4 PG (ref 27–33)
MCHC RBC AUTO-ENTMCNC: 28.5 G/DL (ref 33.6–35)
MCV RBC AUTO: 89.3 FL (ref 81.4–97.8)
MESOTHL CELL NFR FLD: 3 %
MICRO URNS: ABNORMAL
MICROCYTES BLD QL SMEAR: ABNORMAL
MONOCYTES # BLD AUTO: 0.38 K/UL (ref 0–0.85)
MONOCYTES NFR BLD AUTO: 6.1 % (ref 0–13.4)
MORPHOLOGY BLD-IMP: NORMAL
NEUTROPHILS # BLD AUTO: 4.11 K/UL (ref 2–7.15)
NEUTROPHILS NFR BLD: 65.2 % (ref 44–72)
NEUTROPHILS NFR FLD: 1 %
NITRITE UR QL STRIP.AUTO: NEGATIVE
NRBC # BLD AUTO: 0 K/UL
NRBC BLD-RTO: 0 /100 WBC
OVALOCYTES BLD QL SMEAR: NORMAL
PH UR STRIP.AUTO: 5.5 [PH] (ref 5–8)
PLATELET # BLD AUTO: 211 K/UL (ref 164–446)
PLATELET BLD QL SMEAR: NORMAL
PMV BLD AUTO: 10.2 FL (ref 9–12.9)
POLYCHROMASIA BLD QL SMEAR: NORMAL
POTASSIUM SERPL-SCNC: 3.4 MMOL/L (ref 3.6–5.5)
PROT SERPL-MCNC: 6 G/DL (ref 6–8.2)
PROT UR QL STRIP: NEGATIVE MG/DL
PROTHROMBIN TIME: 19.9 SEC (ref 12–14.6)
RBC # BLD AUTO: 3.54 M/UL (ref 4.2–5.4)
RBC # FLD: <2000 CELLS/UL
RBC BLD AUTO: PRESENT
RBC UR QL AUTO: NEGATIVE
SODIUM SERPL-SCNC: 137 MMOL/L (ref 135–145)
SP GR UR STRIP.AUTO: 1.01
TARGETS BLD QL SMEAR: NORMAL
UROBILINOGEN UR STRIP.AUTO-MCNC: 1 MG/DL
WBC # BLD AUTO: 6.3 K/UL (ref 4.8–10.8)
WBC # FLD: 48 CELLS/UL

## 2021-01-30 PROCEDURE — 86803 HEPATITIS C AB TEST: CPT

## 2021-01-30 PROCEDURE — 81003 URINALYSIS AUTO W/O SCOPE: CPT

## 2021-01-30 PROCEDURE — 83735 ASSAY OF MAGNESIUM: CPT

## 2021-01-30 PROCEDURE — 76705 ECHO EXAM OF ABDOMEN: CPT

## 2021-01-30 PROCEDURE — 85027 COMPLETE CBC AUTOMATED: CPT

## 2021-01-30 PROCEDURE — 96375 TX/PRO/DX INJ NEW DRUG ADDON: CPT

## 2021-01-30 PROCEDURE — 93005 ELECTROCARDIOGRAM TRACING: CPT | Performed by: STUDENT IN AN ORGANIZED HEALTH CARE EDUCATION/TRAINING PROGRAM

## 2021-01-30 PROCEDURE — 83690 ASSAY OF LIPASE: CPT

## 2021-01-30 PROCEDURE — 87205 SMEAR GRAM STAIN: CPT

## 2021-01-30 PROCEDURE — 700111 HCHG RX REV CODE 636 W/ 250 OVERRIDE (IP): Performed by: STUDENT IN AN ORGANIZED HEALTH CARE EDUCATION/TRAINING PROGRAM

## 2021-01-30 PROCEDURE — 87070 CULTURE OTHR SPECIMN AEROBIC: CPT

## 2021-01-30 PROCEDURE — 85007 BL SMEAR W/DIFF WBC COUNT: CPT

## 2021-01-30 PROCEDURE — 84703 CHORIONIC GONADOTROPIN ASSAY: CPT

## 2021-01-30 PROCEDURE — 99223 1ST HOSP IP/OBS HIGH 75: CPT | Performed by: STUDENT IN AN ORGANIZED HEALTH CARE EDUCATION/TRAINING PROGRAM

## 2021-01-30 PROCEDURE — 89051 BODY FLUID CELL COUNT: CPT

## 2021-01-30 PROCEDURE — 85610 PROTHROMBIN TIME: CPT

## 2021-01-30 PROCEDURE — 99285 EMERGENCY DEPT VISIT HI MDM: CPT

## 2021-01-30 PROCEDURE — 36415 COLL VENOUS BLD VENIPUNCTURE: CPT

## 2021-01-30 PROCEDURE — 770006 HCHG ROOM/CARE - MED/SURG/GYN SEMI*

## 2021-01-30 PROCEDURE — 86704 HEP B CORE ANTIBODY TOTAL: CPT

## 2021-01-30 PROCEDURE — U0003 INFECTIOUS AGENT DETECTION BY NUCLEIC ACID (DNA OR RNA); SEVERE ACUTE RESPIRATORY SYNDROME CORONAVIRUS 2 (SARS-COV-2) (CORONAVIRUS DISEASE [COVID-19]), AMPLIFIED PROBE TECHNIQUE, MAKING USE OF HIGH THROUGHPUT TECHNOLOGIES AS DESCRIBED BY CMS-2020-01-R: HCPCS

## 2021-01-30 PROCEDURE — 96376 TX/PRO/DX INJ SAME DRUG ADON: CPT

## 2021-01-30 PROCEDURE — 0W9G3ZX DRAINAGE OF PERITONEAL CAVITY, PERCUTANEOUS APPROACH, DIAGNOSTIC: ICD-10-PCS | Performed by: EMERGENCY MEDICINE

## 2021-01-30 PROCEDURE — 700102 HCHG RX REV CODE 250 W/ 637 OVERRIDE(OP): Performed by: STUDENT IN AN ORGANIZED HEALTH CARE EDUCATION/TRAINING PROGRAM

## 2021-01-30 PROCEDURE — 700111 HCHG RX REV CODE 636 W/ 250 OVERRIDE (IP): Performed by: EMERGENCY MEDICINE

## 2021-01-30 PROCEDURE — A9270 NON-COVERED ITEM OR SERVICE: HCPCS | Performed by: STUDENT IN AN ORGANIZED HEALTH CARE EDUCATION/TRAINING PROGRAM

## 2021-01-30 PROCEDURE — 96374 THER/PROPH/DIAG INJ IV PUSH: CPT

## 2021-01-30 PROCEDURE — U0005 INFEC AGEN DETEC AMPLI PROBE: HCPCS

## 2021-01-30 PROCEDURE — 49082 ABD PARACENTESIS: CPT

## 2021-01-30 PROCEDURE — 80053 COMPREHEN METABOLIC PANEL: CPT

## 2021-01-30 PROCEDURE — 96372 THER/PROPH/DIAG INJ SC/IM: CPT

## 2021-01-30 PROCEDURE — 86705 HEP B CORE ANTIBODY IGM: CPT

## 2021-01-30 PROCEDURE — 87340 HEPATITIS B SURFACE AG IA: CPT

## 2021-01-30 RX ORDER — FUROSEMIDE 20 MG/1
20 TABLET ORAL
Status: DISCONTINUED | OUTPATIENT
Start: 2021-01-31 | End: 2021-02-02 | Stop reason: HOSPADM

## 2021-01-30 RX ORDER — OXYCODONE HYDROCHLORIDE 5 MG/1
5 TABLET ORAL
Status: DISCONTINUED | OUTPATIENT
Start: 2021-01-30 | End: 2021-02-02 | Stop reason: HOSPADM

## 2021-01-30 RX ORDER — DIPHENHYDRAMINE HYDROCHLORIDE 50 MG/ML
25 INJECTION INTRAMUSCULAR; INTRAVENOUS EVERY 6 HOURS PRN
Status: DISCONTINUED | OUTPATIENT
Start: 2021-01-30 | End: 2021-02-02 | Stop reason: HOSPADM

## 2021-01-30 RX ORDER — POTASSIUM CHLORIDE 20 MEQ/1
40 TABLET, EXTENDED RELEASE ORAL ONCE
Status: COMPLETED | OUTPATIENT
Start: 2021-01-30 | End: 2021-01-30

## 2021-01-30 RX ORDER — ACETAMINOPHEN 325 MG/1
650 TABLET ORAL EVERY 6 HOURS PRN
Status: DISCONTINUED | OUTPATIENT
Start: 2021-01-30 | End: 2021-01-30

## 2021-01-30 RX ORDER — VITAMIN B COMPLEX
1000 TABLET ORAL
COMMUNITY

## 2021-01-30 RX ORDER — POLYETHYLENE GLYCOL 3350 17 G/17G
1 POWDER, FOR SOLUTION ORAL
Status: DISCONTINUED | OUTPATIENT
Start: 2021-01-30 | End: 2021-02-02 | Stop reason: HOSPADM

## 2021-01-30 RX ORDER — SPIRONOLACTONE 25 MG/1
50 TABLET ORAL
Status: DISCONTINUED | OUTPATIENT
Start: 2021-01-31 | End: 2021-02-02 | Stop reason: HOSPADM

## 2021-01-30 RX ORDER — LEVETIRACETAM 500 MG/1
500 TABLET ORAL 2 TIMES DAILY
Status: DISCONTINUED | OUTPATIENT
Start: 2021-01-30 | End: 2021-02-02 | Stop reason: HOSPADM

## 2021-01-30 RX ORDER — M-VIT,TX,IRON,MINS/CALC/FOLIC 27MG-0.4MG
1 TABLET ORAL
COMMUNITY

## 2021-01-30 RX ORDER — MORPHINE SULFATE 4 MG/ML
4 INJECTION, SOLUTION INTRAMUSCULAR; INTRAVENOUS ONCE
Status: COMPLETED | OUTPATIENT
Start: 2021-01-30 | End: 2021-01-30

## 2021-01-30 RX ORDER — OXYCODONE HYDROCHLORIDE 5 MG/1
2.5 TABLET ORAL
Status: DISCONTINUED | OUTPATIENT
Start: 2021-01-30 | End: 2021-02-02 | Stop reason: HOSPADM

## 2021-01-30 RX ORDER — ONDANSETRON 2 MG/ML
4 INJECTION INTRAMUSCULAR; INTRAVENOUS ONCE
Status: COMPLETED | OUTPATIENT
Start: 2021-01-30 | End: 2021-01-30

## 2021-01-30 RX ORDER — BISACODYL 10 MG
10 SUPPOSITORY, RECTAL RECTAL
Status: DISCONTINUED | OUTPATIENT
Start: 2021-01-30 | End: 2021-02-02 | Stop reason: HOSPADM

## 2021-01-30 RX ORDER — LABETALOL HYDROCHLORIDE 5 MG/ML
10 INJECTION, SOLUTION INTRAVENOUS EVERY 4 HOURS PRN
Status: DISCONTINUED | OUTPATIENT
Start: 2021-01-30 | End: 2021-02-02 | Stop reason: HOSPADM

## 2021-01-30 RX ORDER — HEPARIN SODIUM 5000 [USP'U]/ML
5000 INJECTION, SOLUTION INTRAVENOUS; SUBCUTANEOUS EVERY 8 HOURS
Status: DISCONTINUED | OUTPATIENT
Start: 2021-01-30 | End: 2021-02-02 | Stop reason: HOSPADM

## 2021-01-30 RX ORDER — AMOXICILLIN 250 MG
2 CAPSULE ORAL 2 TIMES DAILY
Status: DISCONTINUED | OUTPATIENT
Start: 2021-01-31 | End: 2021-02-02 | Stop reason: HOSPADM

## 2021-01-30 RX ORDER — HYDROMORPHONE HYDROCHLORIDE 1 MG/ML
0.25 INJECTION, SOLUTION INTRAMUSCULAR; INTRAVENOUS; SUBCUTANEOUS
Status: DISCONTINUED | OUTPATIENT
Start: 2021-01-30 | End: 2021-02-02 | Stop reason: HOSPADM

## 2021-01-30 RX ADMIN — MORPHINE SULFATE 4 MG: 4 INJECTION INTRAVENOUS at 18:25

## 2021-01-30 RX ADMIN — MORPHINE SULFATE 4 MG: 4 INJECTION INTRAVENOUS at 21:17

## 2021-01-30 RX ADMIN — POTASSIUM CHLORIDE 40 MEQ: 1500 TABLET, EXTENDED RELEASE ORAL at 22:04

## 2021-01-30 RX ADMIN — DIPHENHYDRAMINE HYDROCHLORIDE 25 MG: 50 INJECTION, SOLUTION INTRAMUSCULAR; INTRAVENOUS at 22:05

## 2021-01-30 RX ADMIN — ONDANSETRON 4 MG: 2 INJECTION INTRAMUSCULAR; INTRAVENOUS at 18:25

## 2021-01-30 RX ADMIN — LEVETIRACETAM 500 MG: 500 TABLET ORAL at 22:04

## 2021-01-30 RX ADMIN — HEPARIN SODIUM 5000 UNITS: 5000 INJECTION, SOLUTION INTRAVENOUS; SUBCUTANEOUS at 22:05

## 2021-01-30 ASSESSMENT — ENCOUNTER SYMPTOMS
NECK PAIN: 0
CHILLS: 0
DIARRHEA: 0
DEPRESSION: 0
BRUISES/BLEEDS EASILY: 0
ORTHOPNEA: 0
BLOOD IN STOOL: 0
NAUSEA: 0
LOSS OF CONSCIOUSNESS: 0
HEADACHES: 0
COUGH: 0
BLURRED VISION: 0
INSOMNIA: 0
WEAKNESS: 0
FOCAL WEAKNESS: 0
SHORTNESS OF BREATH: 0
SORE THROAT: 0
CONSTIPATION: 0
ABDOMINAL PAIN: 1
VOMITING: 0
HEARTBURN: 0
BACK PAIN: 0
DOUBLE VISION: 0
PALPITATIONS: 0
FEVER: 0
WHEEZING: 0

## 2021-01-30 ASSESSMENT — LIFESTYLE VARIABLES
TOTAL SCORE: 4
HAVE YOU EVER FELT YOU SHOULD CUT DOWN ON YOUR DRINKING: YES
EVER FELT BAD OR GUILTY ABOUT YOUR DRINKING: YES
EVER HAD A DRINK FIRST THING IN THE MORNING TO STEADY YOUR NERVES TO GET RID OF A HANGOVER: YES
ALCOHOL_USE: YES
ON A TYPICAL DAY WHEN YOU DRINK ALCOHOL HOW MANY DRINKS DO YOU HAVE: 10
DOES PATIENT WANT TO STOP DRINKING: YES
AVERAGE NUMBER OF DAYS PER WEEK YOU HAVE A DRINK CONTAINING ALCOHOL: 7
DOES PATIENT WANT TO TALK TO SOMEONE ABOUT QUITTING: YES
CONSUMPTION TOTAL: INCOMPLETE
TOTAL SCORE: 4
HAVE PEOPLE ANNOYED YOU BY CRITICIZING YOUR DRINKING: YES
TOTAL SCORE: 4

## 2021-01-30 ASSESSMENT — PAIN DESCRIPTION - PAIN TYPE
TYPE: ACUTE PAIN
TYPE: ACUTE PAIN

## 2021-01-30 ASSESSMENT — FIBROSIS 4 INDEX
FIB4 SCORE: 3.59
FIB4 SCORE: 21.35

## 2021-01-31 ENCOUNTER — APPOINTMENT (OUTPATIENT)
Dept: CARDIOLOGY | Facility: MEDICAL CENTER | Age: 56
DRG: 433 | End: 2021-01-31
Attending: STUDENT IN AN ORGANIZED HEALTH CARE EDUCATION/TRAINING PROGRAM
Payer: COMMERCIAL

## 2021-01-31 ENCOUNTER — APPOINTMENT (OUTPATIENT)
Dept: RADIOLOGY | Facility: MEDICAL CENTER | Age: 56
DRG: 433 | End: 2021-01-31
Attending: STUDENT IN AN ORGANIZED HEALTH CARE EDUCATION/TRAINING PROGRAM
Payer: COMMERCIAL

## 2021-01-31 PROBLEM — K92.0 GASTROINTESTINAL HEMORRHAGE WITH HEMATEMESIS: Status: RESOLVED | Noted: 2020-10-20 | Resolved: 2021-01-31

## 2021-01-31 PROBLEM — R10.12 LEFT UPPER QUADRANT ABDOMINAL PAIN: Status: ACTIVE | Noted: 2021-01-31

## 2021-01-31 PROBLEM — D61.818 PANCYTOPENIA (HCC): Status: ACTIVE | Noted: 2020-03-27

## 2021-01-31 LAB
GRAM STN SPEC: NORMAL
SARS-COV-2 RNA RESP QL NAA+PROBE: NOTDETECTED
SIGNIFICANT IND 70042: NORMAL
SITE SITE: NORMAL
SOURCE SOURCE: NORMAL
SPECIMEN SOURCE: NORMAL

## 2021-01-31 PROCEDURE — 770006 HCHG ROOM/CARE - MED/SURG/GYN SEMI*

## 2021-01-31 PROCEDURE — 700102 HCHG RX REV CODE 250 W/ 637 OVERRIDE(OP): Performed by: STUDENT IN AN ORGANIZED HEALTH CARE EDUCATION/TRAINING PROGRAM

## 2021-01-31 PROCEDURE — 93306 TTE W/DOPPLER COMPLETE: CPT

## 2021-01-31 PROCEDURE — 700111 HCHG RX REV CODE 636 W/ 250 OVERRIDE (IP): Performed by: INTERNAL MEDICINE

## 2021-01-31 PROCEDURE — 99233 SBSQ HOSP IP/OBS HIGH 50: CPT | Performed by: INTERNAL MEDICINE

## 2021-01-31 PROCEDURE — 71045 X-RAY EXAM CHEST 1 VIEW: CPT

## 2021-01-31 PROCEDURE — A9270 NON-COVERED ITEM OR SERVICE: HCPCS | Performed by: STUDENT IN AN ORGANIZED HEALTH CARE EDUCATION/TRAINING PROGRAM

## 2021-01-31 PROCEDURE — 700111 HCHG RX REV CODE 636 W/ 250 OVERRIDE (IP): Performed by: STUDENT IN AN ORGANIZED HEALTH CARE EDUCATION/TRAINING PROGRAM

## 2021-01-31 RX ORDER — PREDNISONE 20 MG/1
40 TABLET ORAL DAILY
Status: DISCONTINUED | OUTPATIENT
Start: 2021-01-31 | End: 2021-02-02

## 2021-01-31 RX ADMIN — DIPHENHYDRAMINE HYDROCHLORIDE 25 MG: 50 INJECTION, SOLUTION INTRAMUSCULAR; INTRAVENOUS at 07:27

## 2021-01-31 RX ADMIN — LEVETIRACETAM 500 MG: 500 TABLET ORAL at 05:53

## 2021-01-31 RX ADMIN — SPIRONOLACTONE 50 MG: 25 TABLET ORAL at 05:53

## 2021-01-31 RX ADMIN — HYDROMORPHONE HYDROCHLORIDE 0.25 MG: 1 INJECTION, SOLUTION INTRAMUSCULAR; INTRAVENOUS; SUBCUTANEOUS at 18:19

## 2021-01-31 RX ADMIN — HYDROMORPHONE HYDROCHLORIDE 0.25 MG: 1 INJECTION, SOLUTION INTRAMUSCULAR; INTRAVENOUS; SUBCUTANEOUS at 05:50

## 2021-01-31 RX ADMIN — HYDROMORPHONE HYDROCHLORIDE 0.25 MG: 1 INJECTION, SOLUTION INTRAMUSCULAR; INTRAVENOUS; SUBCUTANEOUS at 22:54

## 2021-01-31 RX ADMIN — HYDROMORPHONE HYDROCHLORIDE 0.25 MG: 1 INJECTION, SOLUTION INTRAMUSCULAR; INTRAVENOUS; SUBCUTANEOUS at 11:37

## 2021-01-31 RX ADMIN — DOCUSATE SODIUM 50 MG AND SENNOSIDES 8.6 MG 2 TABLET: 8.6; 5 TABLET, FILM COATED ORAL at 16:39

## 2021-01-31 RX ADMIN — OXYCODONE 5 MG: 5 TABLET ORAL at 16:40

## 2021-01-31 RX ADMIN — HYDROMORPHONE HYDROCHLORIDE 0.25 MG: 1 INJECTION, SOLUTION INTRAMUSCULAR; INTRAVENOUS; SUBCUTANEOUS at 00:36

## 2021-01-31 RX ADMIN — HEPARIN SODIUM 5000 UNITS: 5000 INJECTION, SOLUTION INTRAVENOUS; SUBCUTANEOUS at 20:11

## 2021-01-31 RX ADMIN — FUROSEMIDE 20 MG: 20 TABLET ORAL at 05:53

## 2021-01-31 RX ADMIN — OXYCODONE 5 MG: 5 TABLET ORAL at 20:11

## 2021-01-31 RX ADMIN — HEPARIN SODIUM 5000 UNITS: 5000 INJECTION, SOLUTION INTRAVENOUS; SUBCUTANEOUS at 05:52

## 2021-01-31 RX ADMIN — LEVETIRACETAM 500 MG: 500 TABLET ORAL at 16:40

## 2021-01-31 RX ADMIN — HEPARIN SODIUM 5000 UNITS: 5000 INJECTION, SOLUTION INTRAVENOUS; SUBCUTANEOUS at 13:52

## 2021-01-31 RX ADMIN — OXYCODONE 5 MG: 5 TABLET ORAL at 10:26

## 2021-01-31 RX ADMIN — PREDNISONE 40 MG: 20 TABLET ORAL at 15:29

## 2021-01-31 ASSESSMENT — ENCOUNTER SYMPTOMS
NAUSEA: 1
CHILLS: 0
COUGH: 0
FEVER: 0
HEARTBURN: 0
NERVOUS/ANXIOUS: 1
WEIGHT LOSS: 0
BLOOD IN STOOL: 0
SEIZURES: 0
SPUTUM PRODUCTION: 0
PALPITATIONS: 0
DIARRHEA: 0
VOMITING: 0
SHORTNESS OF BREATH: 0
BACK PAIN: 0
ABDOMINAL PAIN: 1
CONSTIPATION: 0

## 2021-01-31 ASSESSMENT — LIFESTYLE VARIABLES
TOTAL SCORE: 4
HOW MANY TIMES IN THE PAST YEAR HAVE YOU HAD 5 OR MORE DRINKS IN A DAY: 10
AVERAGE NUMBER OF DAYS PER WEEK YOU HAVE A DRINK CONTAINING ALCOHOL: 7
CONSUMPTION TOTAL: POSITIVE
ALCOHOL_USE: YES
EVER HAD A DRINK FIRST THING IN THE MORNING TO STEADY YOUR NERVES TO GET RID OF A HANGOVER: YES
DOES PATIENT WANT TO STOP DRINKING: YES
SUBSTANCE_ABUSE: 1
DOES PATIENT WANT TO TALK TO SOMEONE ABOUT QUITTING: YES
EVER FELT BAD OR GUILTY ABOUT YOUR DRINKING: YES
ON A TYPICAL DAY WHEN YOU DRINK ALCOHOL HOW MANY DRINKS DO YOU HAVE: 10
TOTAL SCORE: 4
HAVE PEOPLE ANNOYED YOU BY CRITICIZING YOUR DRINKING: YES
TOTAL SCORE: 4
HAVE YOU EVER FELT YOU SHOULD CUT DOWN ON YOUR DRINKING: YES

## 2021-01-31 ASSESSMENT — COGNITIVE AND FUNCTIONAL STATUS - GENERAL
TOILETING: A LITTLE
DRESSING REGULAR LOWER BODY CLOTHING: A LITTLE
SUGGESTED CMS G CODE MODIFIER MOBILITY: CK
MOVING TO AND FROM BED TO CHAIR: A LITTLE
PERSONAL GROOMING: A LITTLE
CLIMB 3 TO 5 STEPS WITH RAILING: A LITTLE
DAILY ACTIVITIY SCORE: 19
HELP NEEDED FOR BATHING: A LITTLE
MOBILITY SCORE: 19
SUGGESTED CMS G CODE MODIFIER DAILY ACTIVITY: CK
STANDING UP FROM CHAIR USING ARMS: A LITTLE
TURNING FROM BACK TO SIDE WHILE IN FLAT BAD: A LITTLE
WALKING IN HOSPITAL ROOM: A LITTLE
DRESSING REGULAR UPPER BODY CLOTHING: A LITTLE

## 2021-01-31 ASSESSMENT — PATIENT HEALTH QUESTIONNAIRE - PHQ9
3. TROUBLE FALLING OR STAYING ASLEEP OR SLEEPING TOO MUCH: NEARLY EVERY DAY
8. MOVING OR SPEAKING SO SLOWLY THAT OTHER PEOPLE COULD HAVE NOTICED. OR THE OPPOSITE, BEING SO FIGETY OR RESTLESS THAT YOU HAVE BEEN MOVING AROUND A LOT MORE THAN USUAL: NOT AT ALL
4. FEELING TIRED OR HAVING LITTLE ENERGY: NEARLY EVERY DAY
SUM OF ALL RESPONSES TO PHQ QUESTIONS 1-9: 19
5. POOR APPETITE OR OVEREATING: SEVERAL DAYS
7. TROUBLE CONCENTRATING ON THINGS, SUCH AS READING THE NEWSPAPER OR WATCHING TELEVISION: NEARLY EVERY DAY
SUM OF ALL RESPONSES TO PHQ9 QUESTIONS 1 AND 2: 6
9. THOUGHTS THAT YOU WOULD BE BETTER OFF DEAD, OR OF HURTING YOURSELF: NOT AT ALL
2. FEELING DOWN, DEPRESSED, IRRITABLE, OR HOPELESS: NEARLY EVERY DAY
1. LITTLE INTEREST OR PLEASURE IN DOING THINGS: NEARLY EVERY DAY
6. FEELING BAD ABOUT YOURSELF - OR THAT YOU ARE A FAILURE OR HAVE LET YOURSELF OR YOUR FAMILY DOWN: NEARLY EVERY DAY

## 2021-01-31 ASSESSMENT — PAIN DESCRIPTION - PAIN TYPE
TYPE: ACUTE PAIN

## 2021-01-31 NOTE — ASSESSMENT & PLAN NOTE
States she has a history of lupus as she has joint pains and doctors at [place] diagnosed her with lupus.  I have personally checked care everywhere and see she has negative double-stranded DNA from [place].  She is not on any chronic immunosuppressive therapy and recommend follow-up with PCP.

## 2021-01-31 NOTE — CARE PLAN
Problem: Safety  Goal: Will remain free from injury  Outcome: PROGRESSING AS EXPECTED  Note: Safety precaution in effect. Non skid socks in use. Call light within reach. Reminded patient to call for assist. Hourly rounds in effect. Verbalized understanding.      Problem: Infection  Goal: Will remain free from infection  Outcome: PROGRESSING AS EXPECTED  Note: Implement Standard precaution. Hand washing every encounter & before & after patient care. Verbalized understanding.     Problem: Knowledge Deficit  Goal: Knowledge of disease process/condition, treatment plan, diagnostic tests, and medications will improve  Outcome: PROGRESSING AS EXPECTED  Note: Discussed Plan of care. Test - Echocardiogram. Questions answered. Verbalized understanding.     Problem: Pain Management  Goal: Pain level will decrease to patient's comfort goal  Outcome: PROGRESSING AS EXPECTED  Note: Educated on pain scale. Encouraged to verbalize pain. Will medicate as per MAR.

## 2021-01-31 NOTE — ASSESSMENT & PLAN NOTE
New onset murmur appreciated on physical examination per admitting physician   Echocardiogram  EF 60 %. Possible hypokinetic wall abnormalities and echo with contrast ordered.  Echocardiogram with contrast official reading pending

## 2021-01-31 NOTE — ASSESSMENT & PLAN NOTE
Most likely from alcohol abuse   WBC 1/31- normal but on the setting of pancytopenia can be marker for infection/inflammation   2/1- WBC reducing. Close to her baseline. Continue to monitor. Continue prednisone

## 2021-01-31 NOTE — H&P
Hospital Medicine History & Physical Note    Date of Service  1/30/2021    Primary Care Physician  Pcp Pt States None    Consultants  None    Code Status  Full Code    Chief Complaint  Chief Complaint   Patient presents with   • Abdominal Pain     c/o of abd pain cx by distention. pt presents with jaundice eyes. reports shortness of breath       History of Presenting Illness  55 y.o. female who presented 1/30/2021 with longstanding alcohol abuse greater than 10 years.  She complains of abdominal distention over the past 6 weeks and believes this is due to a lot of fast food she was eating.  She noticed abdominal distention without any specific complaints.  She mentions she had nausea and vomiting over 6 weeks ago with hematemesis and was required to take twice daily Protonix which she admits she has not been taking.  She denies any history of hemorrhoids.    ERP evaluated patient and ordered right upper quadrant ultrasound.  Resulted as gallbladder wall thickening with nondistended gallbladder no visualized gallstones with commonly associated changes related to hepatocellular disease, a calculus cholecystitis is not radiographically excluded.  Hepatomegaly was noted with nodular hepatic contour which favors cirrhosis and small right hepatic lobe cyst and moderate abdominal ascites were noted.  Incidental large right renal cyst appreciated.     Bedside paracentesis was performed with removal of 1.5 L of ascitic fluid.  Analysis does not reveal SBP.    Vitals on admission were as follows: 97.1, 75, 16, 127/87, 96% room air.    Patient was noted to have negative beta-hCG on screening, PT 19.9 seconds and INR 1.64.  Lipase was measured at 59 and CMP performed which revealed mild hypokalemia at 3.4, glucose of 123, AST minimally elevated at 82  and total bilirubin 8.2 otherwise unremarkable.  CBC was performed which revealed moderate normocytic anemia, basophilia and lymphocytopenia without derangements of WBC  count and platelet count within normal limits.    Patient in agreement with inpatient hospital admission for further work-up of her ascites/abdominal distention and pain.  She agrees to full CODE STATUS at time of evaluation and is alert and oriented x4.  She will be optimized prior to transfer to medical nicole floor.    Review of Systems  Review of Systems   Constitutional: Negative for chills and fever.   HENT: Negative for congestion and sore throat.    Eyes: Negative for blurred vision and double vision.   Respiratory: Negative for cough, shortness of breath and wheezing.    Cardiovascular: Negative for chest pain, palpitations, orthopnea and leg swelling.   Gastrointestinal: Positive for abdominal pain (and distention). Negative for blood in stool, constipation, diarrhea, heartburn, melena, nausea and vomiting.   Genitourinary: Negative for dysuria and frequency.   Musculoskeletal: Negative for back pain and neck pain.   Skin: Negative for itching and rash.   Neurological: Negative for focal weakness, loss of consciousness, weakness and headaches.   Endo/Heme/Allergies: Negative for environmental allergies. Does not bruise/bleed easily.   Psychiatric/Behavioral: Negative for depression (depsite chronic alcohol use). The patient does not have insomnia.        Past Medical History   has a past medical history of Alcoholic hepatitis, Psychiatric problem, Sebaceous cyst (9/12/2018), Seizure cerebral (HCC) (6/22/2018), and Thrombocytopenia (HCC).    Surgical History   has a past surgical history that includes bone marrow aspiration (Left, 3/7/2019) and bone marrow biopsy, ndl/trocar (Left, 3/7/2019).     Family History  family history includes Alcohol/Drug in her father and mother; Autoimmune Disease in her daughter and son; Breast Cancer in her mother; Hypertension in her father and mother.     Social History   reports that she has quit smoking. She quit after 5.00 years of use. She has never used smokeless tobacco.  She reports previous alcohol use of about 4.2 oz of alcohol per week. She reports current drug use. Drug: Marijuana.    Allergies  No Known Allergies    Medications  Prior to Admission Medications   Prescriptions Last Dose Informant Patient Reported? Taking?   folic acid (FOLVITE) 1 MG Tab  Patient No No   Sig: TAKE 1 TABLET BY MOUTH EVERY DAY   Patient not taking: Reported on 10/20/2020   levETIRAcetam (KEPPRA) 500 MG Tab  Patient No No   Sig: Take 1 Tab by mouth 2 times a day.   Patient not taking: Reported on 10/20/2020   pantoprazole (PROTONIX) 40 MG Tablet Delayed Response   No No   Sig: Take 1 Tab by mouth 2 times a day.   thiamine (THIAMINE) 100 MG tablet  Patient No No   Sig: Take 1 Tab by mouth every day.   Patient not taking: Reported on 10/20/2020      Facility-Administered Medications: None       Physical Exam  Temp:  [36.2 °C (97.1 °F)] 36.2 °C (97.1 °F)  Pulse:  [75-90] 80  Resp:  [16-29] 23  BP: (107-127)/(56-87) 109/59  SpO2:  [96 %-99 %] 99 %    Physical Exam  Constitutional:       General: She is not in acute distress.     Appearance: She is not ill-appearing, toxic-appearing or diaphoretic.   HENT:      Head: Normocephalic and atraumatic.      Mouth/Throat:      Mouth: Mucous membranes are moist.      Pharynx: Oropharynx is clear. No posterior oropharyngeal erythema.   Eyes:      General: Scleral icterus present.      Extraocular Movements: Extraocular movements intact.      Pupils: Pupils are equal, round, and reactive to light.   Neck:      Musculoskeletal: Normal range of motion.      Vascular: No carotid bruit.   Cardiovascular:      Rate and Rhythm: Normal rate and regular rhythm.      Pulses: Normal pulses.      Heart sounds: Murmur (Grade 3 systolic heart murmur appreciated) present. No friction rub. No gallop.    Pulmonary:      Effort: Pulmonary effort is normal.      Breath sounds: Normal breath sounds. No wheezing, rhonchi or rales.   Abdominal:      General: Bowel sounds are normal.  There is distension.      Palpations: There is no mass.      Tenderness: There is no abdominal tenderness. There is no rebound.      Comments: Ascites appreciated   Musculoskeletal: Normal range of motion.         General: No swelling.      Right lower leg: No edema.      Left lower leg: No edema.   Lymphadenopathy:      Cervical: No cervical adenopathy.   Skin:     General: Skin is warm and dry.      Capillary Refill: Capillary refill takes less than 2 seconds.      Coloration: Skin is jaundiced.      Findings: No erythema or rash.   Neurological:      General: No focal deficit present.      Mental Status: She is alert and oriented to person, place, and time. Mental status is at baseline.      Cranial Nerves: No cranial nerve deficit.      Sensory: No sensory deficit.      Motor: No weakness.   Psychiatric:         Mood and Affect: Mood normal.         Behavior: Behavior normal.         Thought Content: Thought content normal.         Judgment: Judgment normal.         Laboratory:  Recent Labs     01/30/21  1815   WBC 6.3   RBC 3.54*   HEMOGLOBIN 9.0*   HEMATOCRIT 31.6*   MCV 89.3   MCH 25.4*   MCHC 28.5*   RDW 63.5*   PLATELETCT 211   MPV 10.2     Recent Labs     01/30/21  1815   SODIUM 137   POTASSIUM 3.4*   CHLORIDE 104   CO2 23   GLUCOSE 123*   BUN 17   CREATININE 0.79   CALCIUM 9.4     Recent Labs     01/30/21  1815   ALTSGPT 33   ASTSGOT 82*   ALKPHOSPHAT 142*   TBILIRUBIN 8.2*   LIPASE 59   GLUCOSE 123*     Recent Labs     01/30/21  1815   INR 1.64*     No results for input(s): NTPROBNP in the last 72 hours.      No results for input(s): TROPONINT in the last 72 hours.    Imaging:  US-RUQ   Final Result         1.  Gallbladder wall thickening with nondistended gallbladder and no visualized gallstones, commonly associated with changes related to hepatocellular disease, acalculus cholecystitis is not radiographically excluded but would be atypical for a    contracted gallbladder. Could be further evaluated  with HIDA scan as clinically appropriate.   2.  Hepatomegaly and echogenic liver with nodular hepatic contour, appearance favors cirrhosis.   3.  Small right hepatic lobe cyst   4.  Large right renal cyst without visualized complex features   5.  Scattered moderate abdominal ascites      DX-CHEST-PORTABLE (1 VIEW)    (Results Pending)   EC-ECHOCARDIOGRAM COMPLETE W/O CONT    (Results Pending)       I have reviewed the above ultrasound and do appreciate ascitic fluid.      Assessment/Plan:  I anticipate this patient will require at least two midnights for appropriate medical management, necessitating inpatient admission.    * Alcoholic cirrhosis of liver with ascites (HCC)- (present on admission)  Assessment & Plan  Patient states she has been abstinent for the past 6 weeks however has been a chronic alcoholic for 10 years.  She agrees to cessation as she now has new diagnosis of liver failure.  ERP withdrew 1.5 L via paracentesis and negative for SBP.  No need for albumin administration as her blood pressures have remained stable post paracentesis  Meld score of 20 and recommend outpatient follow-up with hepatologist and PCP  Child Fisher score of 11  We will check hepatitis panel to rule out other etiologies  Avoid use of hepatotoxic agents.  Fluid restriction 1200 cc daily  Sodium restriction  We will start with spironolactone 50 mg p.o. daily with goal of titrating upward  We will start with Lasix 20 mg p.o. daily with goal of titrating upward    Murmur- (present on admission)  Assessment & Plan  New onset murmur appreciated on physical examination  Echocardiogram ordered for suspected dilated cardiomyopathy in setting of longstanding EtOH use  EKG ordered and pending    Seizure disorder (HCC)- (present on admission)  Assessment & Plan  Patient states she takes Keppra at home and believes this is 500 mg twice daily  Seizure precautions    Total bilirubin, elevated- (present on admission)  Assessment & Plan  At  level greater than 8 on CMP  Benadryl 25 mg for itching      History of autoimmune disease- (present on admission)  Assessment & Plan  States she has a history of lupus as she has joint pains and doctors at [facility] diagnosed her with lupus.  I have personally checked care everywhere and see she has negative double-stranded DNA from [facility].  She is not on any chronic immunosuppressive therapy and recommend follow-up with PCP.    Hypokalemia- (present on admission)  Assessment & Plan  K-Dur 40 mEq x 1 given and CMP in a.m.  GI prophylaxis: Not indicated  DVT prophylaxis: Heparin  Seizure prophylaxis initiated  CODE STATUS: Full code  Diet: Regular

## 2021-01-31 NOTE — PROGRESS NOTES
2 RN Skin Check  2 RN skin check complete with ALEXANDR Conte.  R breast bruising, small-sized. No open wounds, skin intact. No pressure ulcers found. Cass STRANGE

## 2021-01-31 NOTE — CARE PLAN
Problem: Safety  Goal: Will remain free from injury  Outcome: PROGRESSING AS EXPECTED   Pt with no falls or injuries overnight. All fall precautions implemented, pt understands to use call light if any needs arise.    Problem: Pain Management  Goal: Pain level will decrease to patient's comfort goal  Outcome: PROGRESSING AS EXPECTED   Pt complained of sharp abdominal pain, given Dilaudid x1. Pain better controlled. Will re-assess pain.

## 2021-01-31 NOTE — ED TRIAGE NOTES
"..  Chief Complaint   Patient presents with   • Abdominal Pain     c/o of abd pain cx by distention. pt presents with jaundice eyes. reports shortness of breath         ../87   Pulse 75   Temp 36.2 °C (97.1 °F) (Temporal)   Resp 16   Ht 1.727 m (5' 8\")   Wt 82.6 kg (182 lb)   SpO2 96%     ..Explained triage process, to waiting room. Asked to inform RN if questions or concerns arise.           "

## 2021-01-31 NOTE — ED PROVIDER NOTES
"ED Provider Note    Scribed for Edmund Braxton M.D. by Irene Davis. 1/30/2021  6:01 PM    Primary care provider: Pcp Pt States None  Means of arrival: Walk in  History obtained from: Patient  History limited by: None    CHIEF COMPLAINT  Chief Complaint   Patient presents with   • Abdominal Pain     c/o of abd pain cx by distention. pt presents with jaundice eyes. reports shortness of breath       HPI  Zari Day is a 55 y.o. female who presents to the Emergency Department with worsening abdominal pain onset 3 weeks ago. She describes the pain as being accompanied by abdominal swelling and \"feeling like [she] is pregnant\". She states she initially thought her pain was attributed to constipation and that she has subsequently taken medication for it, but nothing has alleviated her symptoms. She has also been a bit confused as of late, saying she is more forgetful than usual. The patient reports additional symptoms of rash, shortness of breath, and yellowing of her eyes for the last couple of days, and denies hematemesis, leg swelling, or melena. No other exacerbating or alleviating factors were noted. She also denies any history of abdominal surgery or having her stomach drained, and adds she has been told she has elevated liver enzymes. Her last drink was 6 weeks ago, but has had a history of drinking heavily in the past.     REVIEW OF SYSTEMS  Pertinent positives include: abdominal pain, rash, shortness of breath, confusion, jaundice (eyes). Pertinent negatives include: hematemesis, leg swelling, or melena. See history of present illness. All other systems are negative.     PAST MEDICAL HISTORY   has a past medical history of Alcoholic hepatitis, Psychiatric problem, Sebaceous cyst (9/12/2018), Seizure cerebral (HCC) (6/22/2018), and Thrombocytopenia (HCC).    SURGICAL HISTORY   has a past surgical history that includes bone marrow aspiration (Left, 3/7/2019) and bone marrow biopsy, ndl/trocar (Left, " "3/7/2019).    SOCIAL HISTORY  Social History     Tobacco Use   • Smoking status: Former Smoker     Years: 5.00   • Smokeless tobacco: Never Used   • Tobacco comment: smoked in her teens    Substance Use Topics   • Alcohol use: Not Currently     Alcohol/week: 4.2 oz     Types: 3 Cans of beer, 4 Shots of liquor per week     Frequency: 2-3 times a week     Drinks per session: 5 or 6     Binge frequency: Patient refused   • Drug use: Yes     Types: Marijuana     Comment: marijuana      Social History     Substance and Sexual Activity   Drug Use Yes   • Types: Marijuana    Comment: marijuana       FAMILY HISTORY  Family History   Problem Relation Age of Onset   • Hypertension Mother    • Alcohol/Drug Mother    • Breast Cancer Mother    • Hypertension Father    • Alcohol/Drug Father    • Autoimmune Disease Daughter         lupus   • Autoimmune Disease Son         colitis       CURRENT MEDICATIONS  Home Medications     Reviewed by Chelsea Darby on 01/30/21 at 2200  Med List Status: Complete   Medication Last Dose Status   Calcium-Vitamin D-Vitamin K 500-200-40 MG-UNT-MCG Chew Tab 1/29/2021 Active   Cod Liver Oil 1000 MG Cap 1/29/2021 Active   folic acid (FOLVITE) 1 MG Tab 1/29/2021 Active   levETIRAcetam (KEPPRA) 500 MG Tab 1/30/2021 Active   pantoprazole (PROTONIX) 40 MG Tablet Delayed Response Not Taking Active   therapeutic multivitamin-minerals (THERAGRAN-M) Tab 1/29/2021 Active   thiamine (THIAMINE) 100 MG tablet 1/29/2021 Active   vitamin D (CHOLECALCIFEROL) 1000 Unit (25 mcg) Tab 1/29/2021 Active                ALLERGIES  No Known Allergies    PHYSICAL EXAM  VITAL SIGNS: /87   Pulse 75   Temp 36.2 °C (97.1 °F) (Temporal)   Resp 16   Ht 1.727 m (5' 8\")   Wt 82.6 kg (182 lb)   LMP 05/05/2017   SpO2 96%   BMI 27.67 kg/m²     Constitutional: Alert in no apparent distress.  HENT: No signs of trauma, Bilateral external ears normal, Nose normal. Uvula midline.   Eyes: Pupils are equal and reactive, " Scleral icterus  Neck: Normal range of motion, No tenderness, Supple, No stridor.   Lymphatic: No lymphadenopathy noted.   Cardiovascular: Regular rate and rhythm, no murmurs.   Thorax & Lungs: Normal breath sounds, No respiratory distress, No wheezing, No chest tenderness.   Abdomen:  Soft, Diffuse abdominal tenderness to palpation, Abdominal distension, No peritoneal signs, No masses, No pulsatile masses.   Skin: Warm, Dry, No erythema, No rash.   Back: No bony tenderness, No CVA tenderness.   Extremities: Intact distal pulses, No edema, No tenderness, No cyanosis.  Musculoskeletal: Good range of motion in all major joints. No tenderness to palpation or major deformities noted.   Neurologic: Alert , Normal motor function, Normal sensory function, No focal deficits noted.   Psychiatric: Affect normal, Judgment normal, Mood normal.       DIAGNOSTIC STUDIES / PROCEDURES    LABS  Labs Reviewed   CBC WITH DIFFERENTIAL - Abnormal; Notable for the following components:       Result Value    RBC 3.54 (*)     Hemoglobin 9.0 (*)     Hematocrit 31.6 (*)     MCH 25.4 (*)     MCHC 28.5 (*)     RDW 63.5 (*)     Lymphocytes 19.10 (*)     Basophils 7.00 (*)     Baso (Absolute) 0.44 (*)     All other components within normal limits    Narrative:     Indicate which anticoagulants the patient is on:->NONE   COMP METABOLIC PANEL - Abnormal; Notable for the following components:    Potassium 3.4 (*)     Glucose 123 (*)     AST(SGOT) 82 (*)     Alkaline Phosphatase 142 (*)     Total Bilirubin 8.2 (*)     All other components within normal limits    Narrative:     Indicate which anticoagulants the patient is on:->NONE   PROTHROMBIN TIME - Abnormal; Notable for the following components:    PT 19.9 (*)     INR 1.64 (*)     All other components within normal limits    Narrative:     Indicate which anticoagulants the patient is on:->NONE   URINALYSIS - Abnormal; Notable for the following components:    Bilirubin Moderate (*)     All other  components within normal limits   LIPASE    Narrative:     Indicate which anticoagulants the patient is on:->NONE   HCG QUAL SERUM    Narrative:     Indicate which anticoagulants the patient is on:->NONE   ESTIMATED GFR    Narrative:     Indicate which anticoagulants the patient is on:->NONE   DIFFERENTIAL MANUAL    Narrative:     Indicate which anticoagulants the patient is on:->NONE   PERIPHERAL SMEAR REVIEW    Narrative:     Indicate which anticoagulants the patient is on:->NONE   PLATELET ESTIMATE    Narrative:     Indicate which anticoagulants the patient is on:->NONE   MORPHOLOGY    Narrative:     Indicate which anticoagulants the patient is on:->NONE   FLUID CELL COUNT   FLUID CULTURE W/GRAM STAIN   MAGNESIUM   HEP B CORE AB IGM   HEP B CORE AB TOTAL   HEP B SURFACE ANTIGEN   HEP C VIRUS ANTIBODY   SARS-COV-2, PCR (IN-HOUSE)    Narrative:     Have you been in close contact with a person who is suspected  or known to be positive for COVID-19 within the last 30 days  (e.g. last seen that person < 30 days ago)->No   CBC WITHOUT DIFFERENTIAL   COMP METABOLIC PANEL      All labs reviewed by me.    RADIOLOGY  US-RUQ   Final Result         1.  Gallbladder wall thickening with nondistended gallbladder and no visualized gallstones, commonly associated with changes related to hepatocellular disease, acalculus cholecystitis is not radiographically excluded but would be atypical for a    contracted gallbladder. Could be further evaluated with HIDA scan as clinically appropriate.   2.  Hepatomegaly and echogenic liver with nodular hepatic contour, appearance favors cirrhosis.   3.  Small right hepatic lobe cyst   4.  Large right renal cyst without visualized complex features   5.  Scattered moderate abdominal ascites      DX-CHEST-PORTABLE (1 VIEW)    (Results Pending)   EC-ECHOCARDIOGRAM COMPLETE W/O CONT    (Results Pending)     The radiologist's interpretation of all radiological studies have been reviewed by  me.    Paracentesis Procedure Note    Indication: Ascites    Consent: The patient was counseled regarding the procedure, it's indications, risks, potential complications and alternatives and any questions were answered. Consent was obtained.    Procedure: The patient was placed in the supine position with the head of the bed slightly elevated and the appropriate landmarks were identified. The skin over the puncture site in the right anterior axillary line was prepped with betadine and draped in a sterile fashion. Local anesthesia was obtained by infiltration using 1% Lidocaine without epinephrine. A paracentesis catheter was then advanced into the abdominal cavity over a needle under ultrasound guidance and the needle was withdrawn. Fluid was returned which was straw-colored.  A total volume of 1.5 L was withdrawn which was sent to the lab for appropriate testing. The catheter was then withdrawn and a sterile dressing was placed over the site.     The patient tolerated the procedure well.    Complications: None      COURSE & MEDICAL DECISION MAKING  Nursing notes, VS, PMSFHx reviewed in chart.    55 y.o. female p/w chief complaint of abdominal pain.    6:01 PM Patient seen and examined at bedside. Performed bedside ultrasound. Informed the patient that her symptoms are indicative of possible liver failure, as well as plans for hospitalization. Discussed performing a paracentesis. Patient verbalizes understanding and agreement to this plan of care. Ordered US-RUQ, CBC w/diff, CMP, Lipase, UA, Beta-HCG qualitative serum, and PT/INR. Patient will be treated with morphine 4 mg and Zofran 4 mg.     I verified that the patient was wearing a mask and I was wearing appropriate PPE every time I entered the room. The patient's mask was on the patient at all times during my encounter except for a brief view of the oropharynx.     The differential diagnoses include but are not limited to:     #abdominal pain  - Liver failure  likely 2/2 prolonged history of EtOH abuse however last drink was approximately 6 weeks ago  Bedside ultrasound remarkable for a large amount of ascites.     No RLQ pain, TTP or fever to suggest appendicitis  No LLQ pain or TTP or fever to suggest diverticulitis  No pain out of proportion to suggest mesenteric ischemia  No e/o rash or zoster  Ascites fluid not consistent with SBP  No e/o UTI  No elevation in lipase to suggest pancreatitis  MELD score: 20   3-month mortality of near 20%.    6:20 PM Discussed the reasons for performing a paracentesis, as well as it's risks and potential complications; patient was given the opportunity to ask questions. Patient verbalizes understanding and agreement to this plan of care.     6:29 PM Performed the paracentesis procedure under ultrasound guidance, as outlined above.     8:55 PM Ascites fluid not consistent with SBP at this time    8:57 PM Paged the hospitalist for consult.     9:07 PM I discussed the patient's case and the above findings with Dr. Mccray (Hospitalist) who agreed to hospitalize the patient    DISPOSITION:  Patient will be hospitalized by Dr. Mccray in guarded condition.    FINAL IMPRESSION  1. Hepatic encephalopathy (HCC)    2. Cirrhosis of liver with ascites, unspecified hepatic cirrhosis type (HCC)    3. Acute abdominal pain          Irene FONTENOT (Scribe), am scribing for, and in the presence of, Edmund Braxton M.D..    Electronically signed by: Irene Davis (Scribe), 1/30/2021    Edmund FONTENOT M.D. personally performed the services described in this documentation, as scribed by Irene Davis in my presence, and it is both accurate and complete.    C    The note accurately reflects work and decisions made by me.  Edmund Braxton M.D.  1/31/2021  2:12 AM

## 2021-01-31 NOTE — PROGRESS NOTES
[facility] Medicine Daily Progress Note    Date of Service  1/31/2021    Chief Complaint  55 y.o. female admitted 1/30/2021 with abdominal pain     Hospital Course  55-year-old female past medical history of longstanding alcohol abuse, alcohol cirrhosis, alcohol hepatitis, seizure presented 1/30/2021 with abdominal distention, pain mostly on the left side for last 6 weeks.  Decided to come in the hospital for further evaluation due to persistent advice from her family, .  Upon arrival ultrasound showed gallbladder wall thickening with no visual gallstones, hepatomegaly with nodular hepatic contour.  Paracentesis was performed and 1.5 L of ascitic fluid were removed.  Peritoneal fluid analysis negative for SBP.  Meld score 20, Child Fisher score 11.  She was started on spironolactone 50 mg, Lasix 20 mg.  Also a cardiac murmur were noted on physical examination by admitting physician and echocardiogram is pending    Interval Problem Update  She continues to have pain, again mostly sharp on the left side. + nausea. No other event. Started prednisone discriminative score > 32     Consultants/Specialty  none    Code Status  Full Code    Disposition  inpatient     Review of Systems  Review of Systems   Constitutional: Positive for malaise/fatigue. Negative for chills, fever and weight loss.   Respiratory: Negative for cough, sputum production and shortness of breath.    Cardiovascular: Negative for chest pain, palpitations and leg swelling.   Gastrointestinal: Positive for abdominal pain and nausea. Negative for blood in stool, constipation, diarrhea, heartburn and vomiting.   Genitourinary: Negative for dysuria and urgency.   Musculoskeletal: Negative for back pain.   Skin: Negative for itching and rash.   Neurological: Negative for seizures.   Psychiatric/Behavioral: Positive for substance abuse. The patient is nervous/anxious.         Physical Exam  Temp:  [35.9 °C (96.6 °F)-36.2 °C (97.2 °F)] 36.1 °C (96.9  °F)  Pulse:  [63-90] 79  Resp:  [16-49] 16  BP: (104-135)/(56-87) 115/77  SpO2:  [87 %-99 %] 96 %    Physical Exam  Vitals signs reviewed.   Constitutional:       General: She is not in acute distress.     Appearance: She is obese. She is ill-appearing.   HENT:      Head: Normocephalic and atraumatic.   Eyes:      General: Scleral icterus present.   Cardiovascular:      Rate and Rhythm: Normal rate.      Heart sounds: Murmur present.   Pulmonary:      Effort: Pulmonary effort is normal. No respiratory distress.      Breath sounds: No wheezing or rales.   Abdominal:      General: There is distension.      Palpations: Abdomen is soft. There is no mass.      Tenderness: There is abdominal tenderness. There is no guarding.      Hernia: No hernia is present.   Musculoskeletal: Normal range of motion.         General: Tenderness present. No swelling.   Skin:     General: Skin is dry.      Coloration: Skin is not jaundiced or pale.   Neurological:      General: No focal deficit present.      Mental Status: She is alert.      Cranial Nerves: No cranial nerve deficit.      Motor: No weakness.         Fluids    Intake/Output Summary (Last 24 hours) at 1/31/2021 1415  Last data filed at 1/31/2021 1355  Gross per 24 hour   Intake 530 ml   Output 1250 ml   Net -720 ml       Laboratory  Recent Labs     01/30/21 1815   WBC 6.3   RBC 3.54*   HEMOGLOBIN 9.0*   HEMATOCRIT 31.6*   MCV 89.3   MCH 25.4*   MCHC 28.5*   RDW 63.5*   PLATELETCT 211   MPV 10.2     Recent Labs     01/30/21  1815   SODIUM 137   POTASSIUM 3.4*   CHLORIDE 104   CO2 23   GLUCOSE 123*   BUN 17   CREATININE 0.79   CALCIUM 9.4     Recent Labs     01/30/21  1815   INR 1.64*               Imaging  EC-ECHOCARDIOGRAM COMPLETE W/O CONT         DX-CHEST-PORTABLE (1 VIEW)   Final Result         1.  Linear densities in the bilateral mid lungs, likely atelectasis, otherwise no acute cardiopulmonary disease.      US-RUQ   Final Result         1.  Gallbladder wall thickening  with nondistended gallbladder and no visualized gallstones, commonly associated with changes related to hepatocellular disease, acalculus cholecystitis is not radiographically excluded but would be atypical for a    contracted gallbladder. Could be further evaluated with HIDA scan as clinically appropriate.   2.  Hepatomegaly and echogenic liver with nodular hepatic contour, appearance favors cirrhosis.   3.  Small right hepatic lobe cyst   4.  Large right renal cyst without visualized complex features   5.  Scattered moderate abdominal ascites      NM-BILIARY (HIDA) SCAN WITH CCK    (Results Pending)        Assessment/Plan  * Alcoholic cirrhosis of liver with ascites (HCC)- (present on admission)  Assessment & Plan  Patient states she has been abstinent for the past 6 weeks however has been a chronic alcoholic for 10 years.  She agrees to cessation as she now has new diagnosis of liver failure.  Most likely alcoholic hepatitis. She has not drink alcohol in 6 weeks   Discriminative score 34-40   ERP withdrew 1.5 L via paracentesis and negative for SBP. 1/30/2021  No need for albumin administration as her blood pressures have remained stable post paracentesis  Meld score of 20 and recommend outpatient follow-up with hepatologist and PCP  Child Fisher score of 11  Hepatitis panel negative   Avoid use of hepatotoxic agents.  Fluid restriction 1200 cc daily  Sodium restriction  continue spironolactone 50 mg p.o. daily with goal of titrating upward  continue Lasix 20 mg p.o. daily with goal of titrating upward      Murmur- (present on admission)  Assessment & Plan  New onset murmur appreciated on physical examination  Echocardiogram ordered for suspected dilated cardiomyopathy in setting of longstanding EtOH use  EKG pending    Seizure disorder (HCC)- (present on admission)  Assessment & Plan  Resume keppra 500 mg BID  Seizure precautions    Left upper quadrant abdominal pain  Assessment & Plan  Not clear etiology   Most  likely alcoholic hepatitis . Discriminative score 34-40   Still cannot rule out portal hypertension as etiology and splenomegaly as source of pain   asctitis another reason possible   US also cannot rule out acalculous cholecystitis (gallbladder wall thickness can also be from alcoholic hepatitis which is not complete resolved )   HIDA scan ordered and pending   Prednisone 40 mg for 28 days. Then taper for 2-4 weeks. (start date 1/31)   Will consult GI if worsen       Pancytopenia (HCC)- (present on admission)  Assessment & Plan  Most likely from alcohol abuse   WBC 1/31- normal but on the setting of pancytopenia can be marker for infection/inflammation     Total bilirubin, elevated- (present on admission)  Assessment & Plan  At level greater than 8 on CMP  Benadryl 25 mg for itching      History of autoimmune disease- (present on admission)  Assessment & Plan  States she has a history of lupus as she has joint pains and doctors at [facility] diagnosed her with lupus.  I have personally checked care everywhere and see she has negative double-stranded DNA from [place].  She is not on any chronic immunosuppressive therapy and recommend follow-up with PCP.    Hypokalemia- (present on admission)  Assessment & Plan  Replaced.   Continue to monitor        VTE prophylaxis: heparin

## 2021-01-31 NOTE — ASSESSMENT & PLAN NOTE
Not clear etiology   Most likely alcoholic hepatitis . Discriminative score 34-40   Still cannot rule out portal hypertension as etiology and splenomegaly as source of pain   asctitis another reason possible   US also cannot rule out acalculous cholecystitis (gallbladder wall thickness can also be from alcoholic hepatitis which is not complete resolved )   Prednisone 40 mg for 28 days. Then taper for 2-4 weeks. (start date 1/31)   Will consult GI if worsen   HIDA scan ordered and pending because bilirubin is > 5.   2/1- pain still not improving. Continue oral pain meds. Hopefully she will not need IV pain meds. If bilirubin is < 5 tomorrow she can do HIDA. NPO at midnight

## 2021-01-31 NOTE — ASSESSMENT & PLAN NOTE
Patient states she has been abstinent for the past 6 weeks however has been a chronic alcoholic for 10 years.  She agrees to cessation as she now has new diagnosis of liver failure.  Most likely alcoholic hepatitis. She has not drink alcohol in 6 weeks   Discriminative score 34-40   ERP withdrew 1.5 L via paracentesis and negative for SBP. 1/30/2021  No need for albumin administration as her blood pressures have remained stable post paracentesis  Meld score of 20 and recommend outpatient follow-up with hepatologist and PCP  Child Fisher score of 11  Hepatitis panel negative   Avoid use of hepatotoxic agents.  Fluid restriction 1200 cc daily  Sodium restriction  continue spironolactone 50 mg p.o. daily with goal of titrating upward  continue Lasix 20 mg p.o. daily with goal of titrating upward

## 2021-01-31 NOTE — PROGRESS NOTES
0700 Patient's in bed. Bedside report received from  MARIA INES Odell RN at the beginning of the shift.     0810 Patient's sitting up in bed. Educated on the importance/use of IS at least 10x every hour while awake, able to reach 2000. Fall Protocol in effect. Call light within reach. Reminded patient to call for assist. Assessment completed. No distress noted. Plan of care reviewed with the patient. Verbalized understanding.    0900 Patient's sitting up in bed, having Breakfast.     1026 Medicated with Oxycodone (see MAR) for c/o's abdominal pain, rates pain 7/10.     1040 Dr Rodriguez visited. POC discussed with the patient. Verbalized understanding.    1138 Medicated with Dilaudid (see MAR) for c/o's abdominal pain, rates pain 8/10.    1215 Echo is being done in patient's room as per order.    1305 Patient's sitting up in bed, having lunch. No distress noted.     1404 Spoke to Inés from [facility] stated that HIDA Scan with CCK can't be done today because patient had narcotics and Bilirubin is still elevated. It needs to be <5, NPO and off narcotics for 6 hours.      1510 Patient's in bed. No distress noted.     1640 Medicated with Oxycodone (see MAR) for c/o's abdominal pain, rates pain 7/10.     1715 Patient's in bed. No distress noted.    1819 Medicated with Dilaudid (see MAR) for c/o's abdominal pain, rates pain 8/10.    1855  Patient's in bed. No changes in status. Bedside report given to Oncoming MARIA INES RN ([place]).

## 2021-01-31 NOTE — PROGRESS NOTES
Pt complaining of sharp abdominal pain, given PRN pain meds as needed.1200 fluid restriction in place, will monitor I/Os closely. Echo ordered to further assess heart murmur.

## 2021-01-31 NOTE — ED NOTES
Med rec updated and complete. Allergies reviewed.  Pt denies antibiotic use in last 14 days.    [facility].

## 2021-02-01 ENCOUNTER — APPOINTMENT (OUTPATIENT)
Dept: CARDIOLOGY | Facility: MEDICAL CENTER | Age: 56
DRG: 433 | End: 2021-02-01
Attending: INTERNAL MEDICINE
Payer: COMMERCIAL

## 2021-02-01 ENCOUNTER — APPOINTMENT (OUTPATIENT)
Dept: RADIOLOGY | Facility: MEDICAL CENTER | Age: 56
DRG: 433 | End: 2021-02-01
Attending: INTERNAL MEDICINE
Payer: COMMERCIAL

## 2021-02-01 ENCOUNTER — PATIENT OUTREACH (OUTPATIENT)
Dept: HEALTH INFORMATION MANAGEMENT | Facility: OTHER | Age: 56
End: 2021-02-01

## 2021-02-01 LAB
ALBUMIN SERPL BCP-MCNC: 3.1 G/DL (ref 3.2–4.9)
ALBUMIN/GLOB SERPL: 1.3 G/DL
ALP SERPL-CCNC: 123 U/L (ref 30–99)
ALT SERPL-CCNC: 25 U/L (ref 2–50)
ANION GAP SERPL CALC-SCNC: 7 MMOL/L (ref 7–16)
AST SERPL-CCNC: 60 U/L (ref 12–45)
BILIRUB SERPL-MCNC: 6.4 MG/DL (ref 0.1–1.5)
BUN SERPL-MCNC: 19 MG/DL (ref 8–22)
CALCIUM SERPL-MCNC: 8.8 MG/DL (ref 8.5–10.5)
CHLORIDE SERPL-SCNC: 103 MMOL/L (ref 96–112)
CO2 SERPL-SCNC: 25 MMOL/L (ref 20–33)
CREAT SERPL-MCNC: 0.77 MG/DL (ref 0.5–1.4)
ERYTHROCYTE [DISTWIDTH] IN BLOOD BY AUTOMATED COUNT: 63.7 FL (ref 35.9–50)
GLOBULIN SER CALC-MCNC: 2.3 G/DL (ref 1.9–3.5)
GLUCOSE SERPL-MCNC: 147 MG/DL (ref 65–99)
HCT VFR BLD AUTO: 30.5 % (ref 37–47)
HGB BLD-MCNC: 8.4 G/DL (ref 12–16)
INR PPP: 1.72 (ref 0.87–1.13)
LV EJECT FRACT  99904: 60
LV EJECT FRACT  99904: 70
MCH RBC QN AUTO: 24.8 PG (ref 27–33)
MCHC RBC AUTO-ENTMCNC: 27.5 G/DL (ref 33.6–35)
MCV RBC AUTO: 90 FL (ref 81.4–97.8)
PLATELET # BLD AUTO: 152 K/UL (ref 164–446)
PMV BLD AUTO: 11.1 FL (ref 9–12.9)
POTASSIUM SERPL-SCNC: 4.4 MMOL/L (ref 3.6–5.5)
PROT SERPL-MCNC: 5.4 G/DL (ref 6–8.2)
PROTHROMBIN TIME: 20.6 SEC (ref 12–14.6)
RBC # BLD AUTO: 3.39 M/UL (ref 4.2–5.4)
SODIUM SERPL-SCNC: 135 MMOL/L (ref 135–145)
WBC # BLD AUTO: 4 K/UL (ref 4.8–10.8)

## 2021-02-01 PROCEDURE — 700111 HCHG RX REV CODE 636 W/ 250 OVERRIDE (IP): Performed by: INTERNAL MEDICINE

## 2021-02-01 PROCEDURE — 93356 MYOCRD STRAIN IMG SPCKL TRCK: CPT

## 2021-02-01 PROCEDURE — 36415 COLL VENOUS BLD VENIPUNCTURE: CPT

## 2021-02-01 PROCEDURE — 93308 TTE F-UP OR LMTD: CPT | Mod: 26 | Performed by: INTERNAL MEDICINE

## 2021-02-01 PROCEDURE — 700102 HCHG RX REV CODE 250 W/ 637 OVERRIDE(OP): Performed by: STUDENT IN AN ORGANIZED HEALTH CARE EDUCATION/TRAINING PROGRAM

## 2021-02-01 PROCEDURE — 700111 HCHG RX REV CODE 636 W/ 250 OVERRIDE (IP): Performed by: STUDENT IN AN ORGANIZED HEALTH CARE EDUCATION/TRAINING PROGRAM

## 2021-02-01 PROCEDURE — 85027 COMPLETE CBC AUTOMATED: CPT

## 2021-02-01 PROCEDURE — 85610 PROTHROMBIN TIME: CPT

## 2021-02-01 PROCEDURE — 99233 SBSQ HOSP IP/OBS HIGH 50: CPT | Performed by: INTERNAL MEDICINE

## 2021-02-01 PROCEDURE — 93306 TTE W/DOPPLER COMPLETE: CPT | Mod: 26 | Performed by: INTERNAL MEDICINE

## 2021-02-01 PROCEDURE — 80053 COMPREHEN METABOLIC PANEL: CPT

## 2021-02-01 PROCEDURE — 770006 HCHG ROOM/CARE - MED/SURG/GYN SEMI*

## 2021-02-01 PROCEDURE — 700117 HCHG RX CONTRAST REV CODE 255: Performed by: INTERNAL MEDICINE

## 2021-02-01 PROCEDURE — A9270 NON-COVERED ITEM OR SERVICE: HCPCS | Performed by: STUDENT IN AN ORGANIZED HEALTH CARE EDUCATION/TRAINING PROGRAM

## 2021-02-01 RX ADMIN — FUROSEMIDE 20 MG: 20 TABLET ORAL at 08:26

## 2021-02-01 RX ADMIN — DOCUSATE SODIUM 50 MG AND SENNOSIDES 8.6 MG 2 TABLET: 8.6; 5 TABLET, FILM COATED ORAL at 08:27

## 2021-02-01 RX ADMIN — OXYCODONE 5 MG: 5 TABLET ORAL at 15:24

## 2021-02-01 RX ADMIN — OXYCODONE 5 MG: 5 TABLET ORAL at 08:26

## 2021-02-01 RX ADMIN — OXYCODONE 5 MG: 5 TABLET ORAL at 18:41

## 2021-02-01 RX ADMIN — OXYCODONE 5 MG: 5 TABLET ORAL at 11:34

## 2021-02-01 RX ADMIN — OXYCODONE 5 MG: 5 TABLET ORAL at 21:38

## 2021-02-01 RX ADMIN — HEPARIN SODIUM 5000 UNITS: 5000 INJECTION, SOLUTION INTRAVENOUS; SUBCUTANEOUS at 08:27

## 2021-02-01 RX ADMIN — HYDROMORPHONE HYDROCHLORIDE 0.25 MG: 1 INJECTION, SOLUTION INTRAMUSCULAR; INTRAVENOUS; SUBCUTANEOUS at 13:40

## 2021-02-01 RX ADMIN — LEVETIRACETAM 500 MG: 500 TABLET ORAL at 17:06

## 2021-02-01 RX ADMIN — DOCUSATE SODIUM 50 MG AND SENNOSIDES 8.6 MG 2 TABLET: 8.6; 5 TABLET, FILM COATED ORAL at 17:05

## 2021-02-01 RX ADMIN — HEPARIN SODIUM 5000 UNITS: 5000 INJECTION, SOLUTION INTRAVENOUS; SUBCUTANEOUS at 21:37

## 2021-02-01 RX ADMIN — LEVETIRACETAM 500 MG: 500 TABLET ORAL at 08:27

## 2021-02-01 RX ADMIN — HEPARIN SODIUM 5000 UNITS: 5000 INJECTION, SOLUTION INTRAVENOUS; SUBCUTANEOUS at 13:39

## 2021-02-01 RX ADMIN — HYDROMORPHONE HYDROCHLORIDE 0.25 MG: 1 INJECTION, SOLUTION INTRAMUSCULAR; INTRAVENOUS; SUBCUTANEOUS at 09:36

## 2021-02-01 RX ADMIN — HUMAN ALBUMIN MICROSPHERES AND PERFLUTREN 3 ML: 10; .22 INJECTION, SOLUTION INTRAVENOUS at 10:52

## 2021-02-01 RX ADMIN — HYDROMORPHONE HYDROCHLORIDE 0.25 MG: 1 INJECTION, SOLUTION INTRAMUSCULAR; INTRAVENOUS; SUBCUTANEOUS at 16:45

## 2021-02-01 RX ADMIN — PREDNISONE 40 MG: 20 TABLET ORAL at 11:34

## 2021-02-01 RX ADMIN — DIPHENHYDRAMINE HYDROCHLORIDE 25 MG: 50 INJECTION, SOLUTION INTRAMUSCULAR; INTRAVENOUS at 23:02

## 2021-02-01 RX ADMIN — SPIRONOLACTONE 50 MG: 25 TABLET ORAL at 08:27

## 2021-02-01 SDOH — ECONOMIC STABILITY: FOOD INSECURITY: WITHIN THE PAST 12 MONTHS, THE FOOD YOU BOUGHT JUST DIDN'T LAST AND YOU DIDN'T HAVE MONEY TO GET MORE.: SOMETIMES TRUE

## 2021-02-01 SDOH — ECONOMIC STABILITY: INCOME INSECURITY: HOW HARD IS IT FOR YOU TO PAY FOR THE VERY BASICS LIKE FOOD, HOUSING, MEDICAL CARE, AND HEATING?: SOMEWHAT HARD

## 2021-02-01 SDOH — ECONOMIC STABILITY: FOOD INSECURITY: WITHIN THE PAST 12 MONTHS, YOU WORRIED THAT YOUR FOOD WOULD RUN OUT BEFORE YOU GOT MONEY TO BUY MORE.: SOMETIMES TRUE

## 2021-02-01 SDOH — ECONOMIC STABILITY: TRANSPORTATION INSECURITY
IN THE PAST 12 MONTHS, HAS LACK OF TRANSPORTATION KEPT YOU FROM MEETINGS, WORK, OR FROM GETTING THINGS NEEDED FOR DAILY LIVING?: NO

## 2021-02-01 SDOH — ECONOMIC STABILITY: TRANSPORTATION INSECURITY
IN THE PAST 12 MONTHS, HAS THE LACK OF TRANSPORTATION KEPT YOU FROM MEDICAL APPOINTMENTS OR FROM GETTING MEDICATIONS?: NO

## 2021-02-01 ASSESSMENT — PAIN DESCRIPTION - PAIN TYPE
TYPE: ACUTE PAIN

## 2021-02-01 ASSESSMENT — ENCOUNTER SYMPTOMS
NERVOUS/ANXIOUS: 1
ABDOMINAL PAIN: 1
WEIGHT LOSS: 0
BLOOD IN STOOL: 0
FEVER: 0
CONSTIPATION: 0
CHILLS: 0
SEIZURES: 0
SPUTUM PRODUCTION: 0
NAUSEA: 1
BACK PAIN: 0
VOMITING: 0
DIARRHEA: 0
COUGH: 0
PALPITATIONS: 0
SHORTNESS OF BREATH: 0
DIZZINESS: 0
HEARTBURN: 0

## 2021-02-01 ASSESSMENT — LIFESTYLE VARIABLES: SUBSTANCE_ABUSE: 0

## 2021-02-01 NOTE — PROGRESS NOTES
Pt A/Ox4. Pt still complains of pain to L side, but manageable with PRN pain meds. NPO after midnight, as well as no narcotics after midnight, in prep for HIDA scan 2/1. Fluid intake for 24 hrs within 1200 ml.

## 2021-02-01 NOTE — PROGRESS NOTES
[facility] Medicine Daily Progress Note    Date of Service  2/1/2021    Chief Complaint  55 y.o. female admitted 1/30/2021 with abdominal pain     Hospital Course  55-year-old female past medical history of longstanding alcohol abuse, alcohol cirrhosis, alcohol hepatitis, seizure presented 1/30/2021 with abdominal distention, pain mostly on the left side for last 6 weeks.  Decided to come in the hospital for further evaluation due to persistent advice from her family, .  Upon arrival ultrasound showed gallbladder wall thickening with no visual gallstones, hepatomegaly with nodular hepatic contour.  Paracentesis was performed and 1.5 L of ascitic fluid were removed.  Peritoneal fluid analysis negative for SBP.  Meld score 20, Child Fisher score 11.  She was started on spironolactone 50 mg, Lasix 20 mg.  Also a cardiac murmur were noted on physical examination by admitting physician and echocardiogram is pending    Interval Problem Update  She continues to have pain, again mostly sharp on the left side. + nausea. No other event. Started prednisone discriminative score > 32   2/1- pt in tear and pain during my examination. She describes as colic pain. Still requiring IV pain meds. Pt concern that she does not have insurance and she would like to leave. Bilirubin improving but INR worse. Continue prednisone. HIDA scan cannot be done due to elevated bilirubin. Will continue pain management monitor lab work. If pt remain without IV pain meds overnight ok to be dc tomorrow with outpatient follow up.     Consultants/Specialty  none    Code Status  Full Code    Disposition  inpatient     Review of Systems  Review of Systems   Constitutional: Positive for malaise/fatigue. Negative for chills, fever and weight loss.   Respiratory: Negative for cough, sputum production and shortness of breath.    Cardiovascular: Negative for chest pain, palpitations and leg swelling.   Gastrointestinal: Positive for abdominal pain and  nausea. Negative for blood in stool, constipation, diarrhea, heartburn and vomiting.   Genitourinary: Negative for dysuria and urgency.   Musculoskeletal: Negative for back pain.   Skin: Negative for itching and rash.   Neurological: Negative for dizziness and seizures.   Psychiatric/Behavioral: Negative for substance abuse. The patient is nervous/anxious.         Physical Exam  Temp:  [36.1 °C (97 °F)-37.1 °C (98.7 °F)] 36.8 °C (98.3 °F)  Pulse:  [80-89] 89  Resp:  [16] 16  BP: (106-119)/(69-75) 115/72  SpO2:  [94 %-100 %] 96 %    Physical Exam  Vitals signs reviewed.   Constitutional:       General: She is in acute distress.      Appearance: She is obese. She is ill-appearing.   HENT:      Head: Normocephalic and atraumatic.   Eyes:      General: Scleral icterus present.   Cardiovascular:      Rate and Rhythm: Normal rate.      Heart sounds: No murmur.   Pulmonary:      Effort: Pulmonary effort is normal. No respiratory distress.      Breath sounds: No wheezing or rales.   Abdominal:      General: There is distension.      Palpations: Abdomen is soft. There is no mass.      Tenderness: There is abdominal tenderness. There is no guarding.      Hernia: No hernia is present.   Musculoskeletal: Normal range of motion.         General: Tenderness present. No swelling.   Skin:     General: Skin is dry.      Coloration: Skin is not jaundiced or pale.   Neurological:      General: No focal deficit present.      Mental Status: She is alert.      Cranial Nerves: No cranial nerve deficit.      Motor: No weakness.         Fluids    Intake/Output Summary (Last 24 hours) at 2/1/2021 1204  Last data filed at 1/31/2021 2130  Gross per 24 hour   Intake 295 ml   Output 450 ml   Net -155 ml       Laboratory  Recent Labs     01/30/21  1815 02/01/21  0630   WBC 6.3 4.0*   RBC 3.54* 3.39*   HEMOGLOBIN 9.0* 8.4*   HEMATOCRIT 31.6* 30.5*   MCV 89.3 90.0   MCH 25.4* 24.8*   MCHC 28.5* 27.5*   RDW 63.5* 63.7*   PLATELETCT 211 152*   MPV  10.2 11.1     Recent Labs     01/30/21  1815 02/01/21  0630   SODIUM 137 135   POTASSIUM 3.4* 4.4   CHLORIDE 104 103   CO2 23 25   GLUCOSE 123* 147*   BUN 17 19   CREATININE 0.79 0.77   CALCIUM 9.4 8.8     Recent Labs     01/30/21  1815 02/01/21  0630   INR 1.64* 1.72*               Imaging  EC-ECHOCARDIOGRAM LTD W/ CONT   Final Result      EC-ECHOCARDIOGRAM COMPLETE W/O CONT   Final Result      DX-CHEST-PORTABLE (1 VIEW)   Final Result         1.  Linear densities in the bilateral mid lungs, likely atelectasis, otherwise no acute cardiopulmonary disease.      US-RUQ   Final Result         1.  Gallbladder wall thickening with nondistended gallbladder and no visualized gallstones, commonly associated with changes related to hepatocellular disease, acalculus cholecystitis is not radiographically excluded but would be atypical for a    contracted gallbladder. Could be further evaluated with HIDA scan as clinically appropriate.   2.  Hepatomegaly and echogenic liver with nodular hepatic contour, appearance favors cirrhosis.   3.  Small right hepatic lobe cyst   4.  Large right renal cyst without visualized complex features   5.  Scattered moderate abdominal ascites      NM-BILIARY (HIDA) SCAN WITH CCK    (Results Pending)        Assessment/Plan  * Alcoholic cirrhosis of liver with ascites (HCC)- (present on admission)  Assessment & Plan  Patient states she has been abstinent for the past 6 weeks however has been a chronic alcoholic for 10 years.  She agrees to cessation as she now has new diagnosis of liver failure.  Most likely alcoholic hepatitis. She has not drink alcohol in 6 weeks   Discriminative score 34-40   ERP withdrew 1.5 L via paracentesis and negative for SBP. 1/30/2021  No need for albumin administration as her blood pressures have remained stable post paracentesis  Meld score of 20 and recommend outpatient follow-up with hepatologist and PCP  Child Fisher score of 11  Hepatitis panel negative   Avoid use of  hepatotoxic agents.  Fluid restriction 1200 cc daily  Sodium restriction  continue spironolactone 50 mg p.o. daily with goal of titrating upward  continue Lasix 20 mg p.o. daily with goal of titrating upward      Murmur- (present on admission)  Assessment & Plan  New onset murmur appreciated on physical examination per admitting physician   Echocardiogram  EF 60 %. Possible hypokinetic wall abnormalities and echo with contrast ordered.  Echocardiogram with contrast official reading pending     Seizure disorder (HCC)- (present on admission)  Assessment & Plan  Resume keppra 500 mg BID  Seizure precautions    Left upper quadrant abdominal pain  Assessment & Plan  Not clear etiology   Most likely alcoholic hepatitis . Discriminative score 34-40   Still cannot rule out portal hypertension as etiology and splenomegaly as source of pain   asctitis another reason possible   US also cannot rule out acalculous cholecystitis (gallbladder wall thickness can also be from alcoholic hepatitis which is not complete resolved )   Prednisone 40 mg for 28 days. Then taper for 2-4 weeks. (start date 1/31)   Will consult GI if worsen   HIDA scan ordered and pending because bilirubin is > 5.   2/1- pain still not improving. Continue oral pain meds. Hopefully she will not need IV pain meds. If bilirubin is < 5 tomorrow she can do HIDA. NPO at midnight       Pancytopenia (HCC)- (present on admission)  Assessment & Plan  Most likely from alcohol abuse   WBC 1/31- normal but on the setting of pancytopenia can be marker for infection/inflammation   2/1- WBC reducing. Close to her baseline. Continue to monitor. Continue prednisone     Total bilirubin, elevated- (present on admission)  Assessment & Plan  At level greater than 8 on CMP  Benadryl 25 mg for itching  2/1- bilirubin 6.4. improving.       History of autoimmune disease- (present on admission)  Assessment & Plan  States she has a history of lupus as she has joint pains and doctors at  [facility] diagnosed her with lupus.  I have personally checked care everywhere and see she has negative double-stranded DNA from [facility].  She is not on any chronic immunosuppressive therapy and recommend follow-up with PCP.    Hypokalemia- (present on admission)  Assessment & Plan  Replaced.   Continue to monitor        VTE prophylaxis: heparin

## 2021-02-01 NOTE — PROGRESS NOTES
Community Health Worker Intake  • Social determinates of health intake Complete.   • Identified barriers to Housing and Utility assistance.  • Contact information provided to Zari Day   • Has PCP appointment scheduled provided information to use [facility] walk in sliding fee service  • Accepted Meds-To-Beds.   • Inpatient assessment completed.    EMBER Hayes met with pt bedside to introduce CCM program. Pt states need for a PCP. CHW provided information for [facility] clinic sliding fee scale and insurance assistance program in follow up tab. Pt states need for housing and utility assistance, CHW will mail available programs. Pt identifies no other barriers to resources. Pt expressed no other needs at this time.    Plan: CHW will follow up with pt after d/c.

## 2021-02-01 NOTE — CARE PLAN
Problem: Safety  Goal: Will remain free from injury  Outcome: PROGRESSING AS EXPECTED   Pt has remained free of falls, seizure precautions still implemented with padded side rails. Pt understands to use call light if she needs to get up, or for any other needs.    Problem: Pain Management  Goal: Pain level will decrease to patient's comfort goal  Outcome: PROGRESSING AS EXPECTED   Pain has been managed with PRN pain meds. See MAR.

## 2021-02-02 ENCOUNTER — APPOINTMENT (OUTPATIENT)
Dept: RADIOLOGY | Facility: MEDICAL CENTER | Age: 56
DRG: 433 | End: 2021-02-02
Attending: INTERNAL MEDICINE
Payer: COMMERCIAL

## 2021-02-02 VITALS
HEIGHT: 68 IN | DIASTOLIC BLOOD PRESSURE: 79 MMHG | SYSTOLIC BLOOD PRESSURE: 109 MMHG | BODY MASS INDEX: 26.33 KG/M2 | TEMPERATURE: 98.2 F | HEART RATE: 75 BPM | WEIGHT: 173.72 LBS | OXYGEN SATURATION: 98 % | RESPIRATION RATE: 16 BRPM

## 2021-02-02 LAB
ALBUMIN SERPL BCP-MCNC: 2.9 G/DL (ref 3.2–4.9)
ALBUMIN/GLOB SERPL: 1.3 G/DL
ALP SERPL-CCNC: 117 U/L (ref 30–99)
ALT SERPL-CCNC: 25 U/L (ref 2–50)
ANION GAP SERPL CALC-SCNC: 7 MMOL/L (ref 7–16)
AST SERPL-CCNC: 37 U/L (ref 12–45)
BACTERIA FLD AEROBE CULT: NORMAL
BASOPHILS # BLD AUTO: 0.1 % (ref 0–1.8)
BASOPHILS # BLD: 0.01 K/UL (ref 0–0.12)
BILIRUB SERPL-MCNC: 4.9 MG/DL (ref 0.1–1.5)
BUN SERPL-MCNC: 20 MG/DL (ref 8–22)
CALCIUM SERPL-MCNC: 8.6 MG/DL (ref 8.5–10.5)
CHLORIDE SERPL-SCNC: 103 MMOL/L (ref 96–112)
CO2 SERPL-SCNC: 23 MMOL/L (ref 20–33)
CREAT SERPL-MCNC: 0.78 MG/DL (ref 0.5–1.4)
EOSINOPHIL # BLD AUTO: 0.05 K/UL (ref 0–0.51)
EOSINOPHIL NFR BLD: 0.7 % (ref 0–6.9)
ERYTHROCYTE [DISTWIDTH] IN BLOOD BY AUTOMATED COUNT: 65.4 FL (ref 35.9–50)
GLOBULIN SER CALC-MCNC: 2.2 G/DL (ref 1.9–3.5)
GLUCOSE SERPL-MCNC: 125 MG/DL (ref 65–99)
GRAM STN SPEC: NORMAL
HCT VFR BLD AUTO: 28.5 % (ref 37–47)
HGB BLD-MCNC: 8 G/DL (ref 12–16)
IMM GRANULOCYTES # BLD AUTO: 0.05 K/UL (ref 0–0.11)
IMM GRANULOCYTES NFR BLD AUTO: 0.7 % (ref 0–0.9)
LYMPHOCYTES # BLD AUTO: 1 K/UL (ref 1–4.8)
LYMPHOCYTES NFR BLD: 13.6 % (ref 22–41)
MCH RBC QN AUTO: 25.6 PG (ref 27–33)
MCHC RBC AUTO-ENTMCNC: 28.1 G/DL (ref 33.6–35)
MCV RBC AUTO: 91.3 FL (ref 81.4–97.8)
MONOCYTES # BLD AUTO: 0.61 K/UL (ref 0–0.85)
MONOCYTES NFR BLD AUTO: 8.3 % (ref 0–13.4)
NEUTROPHILS # BLD AUTO: 5.63 K/UL (ref 2–7.15)
NEUTROPHILS NFR BLD: 76.6 % (ref 44–72)
NRBC # BLD AUTO: 0 K/UL
NRBC BLD-RTO: 0 /100 WBC
PLATELET # BLD AUTO: 147 K/UL (ref 164–446)
PMV BLD AUTO: 11 FL (ref 9–12.9)
POTASSIUM SERPL-SCNC: 4.4 MMOL/L (ref 3.6–5.5)
PROT SERPL-MCNC: 5.1 G/DL (ref 6–8.2)
RBC # BLD AUTO: 3.12 M/UL (ref 4.2–5.4)
SIGNIFICANT IND 70042: NORMAL
SITE SITE: NORMAL
SODIUM SERPL-SCNC: 133 MMOL/L (ref 135–145)
SOURCE SOURCE: NORMAL
WBC # BLD AUTO: 7.4 K/UL (ref 4.8–10.8)

## 2021-02-02 PROCEDURE — A9270 NON-COVERED ITEM OR SERVICE: HCPCS | Performed by: STUDENT IN AN ORGANIZED HEALTH CARE EDUCATION/TRAINING PROGRAM

## 2021-02-02 PROCEDURE — 99239 HOSP IP/OBS DSCHRG MGMT >30: CPT | Performed by: INTERNAL MEDICINE

## 2021-02-02 PROCEDURE — 36415 COLL VENOUS BLD VENIPUNCTURE: CPT

## 2021-02-02 PROCEDURE — 700102 HCHG RX REV CODE 250 W/ 637 OVERRIDE(OP): Performed by: STUDENT IN AN ORGANIZED HEALTH CARE EDUCATION/TRAINING PROGRAM

## 2021-02-02 PROCEDURE — 80053 COMPREHEN METABOLIC PANEL: CPT

## 2021-02-02 PROCEDURE — A9537 TC99M MEBROFENIN: HCPCS

## 2021-02-02 PROCEDURE — 85025 COMPLETE CBC W/AUTO DIFF WBC: CPT

## 2021-02-02 RX ORDER — AMOXICILLIN 250 MG
2 CAPSULE ORAL 2 TIMES DAILY
Qty: 30 TAB | Refills: 0 | Status: SHIPPED | OUTPATIENT
Start: 2021-02-02

## 2021-02-02 RX ORDER — FUROSEMIDE 20 MG/1
20 TABLET ORAL DAILY
Qty: 30 TAB | Refills: 0 | Status: SHIPPED | OUTPATIENT
Start: 2021-02-03 | End: 2021-02-02

## 2021-02-02 RX ORDER — FUROSEMIDE 20 MG/1
20 TABLET ORAL DAILY
Qty: 30 TAB | Refills: 0 | Status: SHIPPED | OUTPATIENT
Start: 2021-02-03

## 2021-02-02 RX ORDER — SPIRONOLACTONE 50 MG/1
50 TABLET, FILM COATED ORAL DAILY
Qty: 30 TAB | Refills: 0 | Status: SHIPPED | OUTPATIENT
Start: 2021-02-03

## 2021-02-02 RX ORDER — OXYCODONE HYDROCHLORIDE 5 MG/1
5 TABLET ORAL EVERY 6 HOURS PRN
Qty: 20 TAB | Refills: 0 | Status: SHIPPED | OUTPATIENT
Start: 2021-02-02 | End: 2021-02-07

## 2021-02-02 RX ORDER — OXYCODONE HYDROCHLORIDE 5 MG/1
5 TABLET ORAL EVERY 6 HOURS PRN
Qty: 20 TAB | Refills: 0 | Status: SHIPPED | OUTPATIENT
Start: 2021-02-02 | End: 2021-02-02

## 2021-02-02 RX ORDER — AMOXICILLIN 250 MG
2 CAPSULE ORAL 2 TIMES DAILY
Qty: 30 TAB | Refills: 0 | Status: SHIPPED | OUTPATIENT
Start: 2021-02-02 | End: 2021-02-02

## 2021-02-02 RX ORDER — SPIRONOLACTONE 50 MG/1
50 TABLET, FILM COATED ORAL DAILY
Qty: 30 TAB | Refills: 0 | Status: SHIPPED | OUTPATIENT
Start: 2021-02-03 | End: 2021-02-02

## 2021-02-02 RX ADMIN — LEVETIRACETAM 500 MG: 500 TABLET ORAL at 10:10

## 2021-02-02 RX ADMIN — OXYCODONE 5 MG: 5 TABLET ORAL at 10:10

## 2021-02-02 RX ADMIN — FUROSEMIDE 20 MG: 20 TABLET ORAL at 10:09

## 2021-02-02 RX ADMIN — SPIRONOLACTONE 50 MG: 25 TABLET ORAL at 10:09

## 2021-02-02 RX ADMIN — OXYCODONE 5 MG: 5 TABLET ORAL at 13:14

## 2021-02-02 RX ADMIN — DOCUSATE SODIUM 50 MG AND SENNOSIDES 8.6 MG 2 TABLET: 8.6; 5 TABLET, FILM COATED ORAL at 10:09

## 2021-02-02 RX ADMIN — OXYCODONE 5 MG: 5 TABLET ORAL at 00:00

## 2021-02-02 ASSESSMENT — PAIN DESCRIPTION - PAIN TYPE: TYPE: ACUTE PAIN

## 2021-02-02 NOTE — DISCHARGE INSTRUCTIONS
Discharge Instructions    Discharged to home by car with relative. Discharged via wheelchair, hospital escort: Yes.  Special equipment needed: Not Applicable    Be sure to schedule a follow-up appointment with your primary care doctor or any specialists as instructed.     Discharge Plan:   Influenza Vaccine Indication: Patient Refuses    I understand that a diet low in cholesterol, fat, and sodium is recommended for good health. Unless I have been given specific instructions below for another diet, I accept this instruction as my diet prescription.   Other diet: sodium restricted 2g    Special Instructions: None    · Is patient discharged on Warfarin / Coumadin?   No     Depression / Suicide Risk    As you are discharged from this [facility] facility, it is important to learn how to keep safe from harming yourself.    Recognize the warning signs:  · Abrupt changes in personality, positive or negative- including increase in energy   · Giving away possessions  · Change in eating patterns- significant weight changes-  positive or negative  · Change in sleeping patterns- unable to sleep or sleeping all the time   · Unwillingness or inability to communicate  · Depression  · Unusual sadness, discouragement and loneliness  · Talk of wanting to die  · Neglect of personal appearance   · Rebelliousness- reckless behavior  · Withdrawal from people/activities they love  · Confusion- inability to concentrate     If you or a loved one observes any of these behaviors or has concerns about self-harm, here's what you can do:  · Talk about it- your feelings and reasons for harming yourself  · Remove any means that you might use to hurt yourself (examples: pills, rope, extension cords, firearm)  · Get professional help from the community (Mental Health, Substance Abuse, psychological counseling)  · Do not be alone:Call your Safe Contact- someone whom you trust who will be there for you.  · Call your local CRISIS HOTLINE 883-0375 or  "957.929.2138  · Call your local Children's Mobile Crisis Response Team [place] (738) 321-5466 or www.[employer]  · Call the toll free National Suicide Prevention Hotlines   · National Suicide Prevention Lifeline 486-806-LWLS (7270)  · National Hope Line Network 800-SUICIDE (489-4485)  Alcoholic Hepatitis  Alcoholic hepatitis is a condition of the liver characterized by direct injury to the liver cells from alcohol abuse. This disease can occur after years of excessive drinking, or it may occur after isolated episodes of heavy alcohol consumption, such as binge drinking. The symptoms may include:  · Loss of appetite.   · Nausea or vomiting.   · Abdominal pain.   · Fever.   · Jaundice (yellowing of the skin or whites of the eyes).   · Dark urine.   · Swelling of the liver (fullness in your right upper abdomen).   Vomiting blood, fainting, memory loss, blackouts and hallucinations can develop as the disease progresses. This becomes worse with longer abuse. The more you drink, the greater the risk for severe alcoholic hepatitis. You do not have to drink to the point of being \"drunk\" to cause liver damage. Permanent liver damage with cirrhosis, liver failure and death can develop if you continue to drink.  A poor diet is a big part of the problem. Heavy drinkers do not eat a balanced diet. Essential foods and vitamins may be missing from the diet. This leads to malnutrition, which damages the liver further, and can damage the brain, nerves, stomach and other organ systems. Treating alcoholic hepatitis means that you need to stop drinking and start eating a well-balanced diet including appropriate calories, proteins, vitamins and certain fats. You should also take vitamin supplements, especially vitamin B1 (thiamine) and folic acid. You must stay away from alcohol to recover. Many of the effects of acute alcoholic hepatitis are reversible, if you avoid further alcohol use. Avoid other drugs that are toxic to " the liver; your caregiver can tell you which drugs these are. Joining a recovery group such as AA or seeking a rehabilitation clinic or group can help give you the support you need to make this change possible.  SEEK IMMEDIATE MEDICAL CARE IF:   · You have increasing abdominal pain.   · You have persistent vomiting or are unable to tolerate oral nutrition.   · You have blood in your vomit.   · You have confusion, lethargy (a lack of energy) or any change in your normal mental abilities.   · You develop jaundice.   Document Released: 01/25/2006 Document Revised: 03/11/2013 Document Reviewed: 12/20/2010  ExitCare® Patient Information ©2013 [employer].    Cirrhosis    Cirrhosis is long-term (chronic) liver injury. The liver is the body's largest internal organ, and it performs many functions. It converts food into energy, removes toxic material from the blood, makes important proteins, and absorbs necessary vitamins from food.  In cirrhosis, healthy liver cells are replaced by scar tissue. This prevents blood from flowing through the liver, making it difficult for the liver to function. Scarring of the liver cannot be reversed, but treatment can prevent it from getting worse.  What are the causes?  Common causes of this condition are hepatitis C and long-term alcohol abuse. Other causes include:  · Nonalcoholic fatty liver disease. This happens when fat is deposited in the liver by causes other than alcohol.  · Hepatitis B infection.  · Autoimmune hepatitis. In this condition, the body's defense system (immune system) mistakenly attacks the liver cells, causing irritation and swelling (inflammation).  · Diseases that cause blockage of ducts inside the liver.  · Inherited liver diseases, such as hemochromatosis. This is one of the most common inherited liver diseases. In this disease, deposits of iron collect in the liver and other organs.  · Reactions to certain long-term medicines, such as amiodarone, a heart  medicine.  · Parasitic infections. These include schistosomiasis, which is caused by a flatworm.  · Long-term contact to certain toxins. These toxins include certain organic solvents, such as toluene and chloroform.  What increases the risk?  You are more likely to develop this condition if:  · You have certain types of viral hepatitis.  · You abuse alcohol, especially if you are female.  · You are overweight.  · You share needles.  · You have unprotected sex with someone who has viral hepatitis.  What are the signs or symptoms?  You may not have any signs and symptoms at first. Symptoms may not develop until the damage to your liver starts to get worse.  Early symptoms may include:  · Weakness and tiredness (fatigue).  · Changes in sleep patterns or having trouble sleeping.  · Itchiness.  · Tenderness in the right-upper part of your abdomen.  · Weight loss and muscle loss.  · Nausea.  · Loss of appetite.  · Appearance of tiny blood vessels under the skin.  Later symptoms may include:  · Fatigue or weakness that is getting worse.  · Yellow skin and eyes (jaundice).  · Buildup of fluid in the abdomen (ascites). You may notice that your clothes are tight around your waist.  · Weight gain.  · Swelling of the feet and ankles (edema).  · Trouble breathing.  · Easy bruising and bleeding.  · Vomiting blood.  · Black or bloody stool.  · Mental confusion.  How is this diagnosed?  Your health care provider may suspect cirrhosis based on your symptoms and medical history, especially if you have other medical conditions or a history of alcohol abuse. Your health care provider will do a physical exam to feel your liver and to check for signs of cirrhosis. He or she may perform other tests, including:  · Blood tests to check:  ? For hepatitis B or C.  ? Kidney function.  ? Liver function.  · Imaging tests such as:  ? MRI or CT scan to look for changes seen in advanced cirrhosis.  ? Ultrasound to see if normal liver tissue is  being replaced by scar tissue.  · A procedure in which a long needle is used to take a sample of liver tissue to be checked in a lab (biopsy). Liver biopsy can confirm the diagnosis of cirrhosis.  How is this treated?  Treatment for this condition depends on how damaged your liver is and what caused the damage. It may include treating the symptoms of cirrhosis, or treating the underlying causes in order to slow the damage. Treatment may include:  · Making lifestyle changes, such as:  ? Eating a healthy diet. You may need to work with your health care provider or a diet and nutrition specialist (dietitian) to develop an eating plan.  ? Restricting salt intake.  ? Maintaining a healthy weight.  ? Not abusing drugs or alcohol.  · Taking medicines to:  ? Treat liver infections or other infections.  ? Control itching.  ? Reduce fluid buildup.  ? Reduce certain blood toxins.  ? Reduce risk of bleeding from enlarged blood vessels in the stomach or esophagus (varices).  · Liver transplant. In this procedure, a liver from a donor is used to replace your diseased liver. This is done if cirrhosis has caused liver failure.  Other treatments and procedures may be done depending on the problems that you get from cirrhosis. Common problems include liver-related kidney failure (hepatorenal syndrome).  Follow these instructions at home:    · Take medicines only as told by your health care provider. Do not use medicines that are toxic to your liver. Ask your health care provider before taking any new medicines, including over-the-counter medicines.  · Rest as needed.  · Eat a well-balanced diet. Ask your health care provider or dietitian for more information.  · Limit your salt or water intake, if your health care provider asks you to do this.  · Do not drink alcohol. This is especially important if you are taking acetaminophen.  · Keep all follow-up visits as told by your health care provider. This is important.  Contact a health  care provider if you:  · Have fatigue or weakness that is getting worse.  · Develop swelling of the hands, feet, legs, or face.  · Have a fever.  · Develop loss of appetite.  · Have nausea or vomiting.  · Develop jaundice.  · Develop easy bruising or bleeding.  Get help right away if you:  · Vomit bright red blood or a material that looks like coffee grounds.  · Have blood in your stools.  · Notice that your stools appear black and tarry.  · Become confused.  · Have chest pain or trouble breathing.  Summary  · Cirrhosis is chronic liver injury. Liver damage cannot be reversed. Common causes are hepatitis C and long-term alcohol abuse.  · Tests used to diagnose cirrhosis include blood tests, imaging tests, and liver biopsy.  · Treatment for this condition involves treating the underlying cause. Avoid alcohol, drugs, salt, and medicines that may damage your liver.  · Contact your health care provider if you develop ascites, edema, jaundice, fever, nausea or vomiting, easy bruising or bleeding, or worsening fatigue.  This information is not intended to replace advice given to you by your health care provider. Make sure you discuss any questions you have with your health care provider.  Document Released: 12/18/2006 Document Revised: 04/08/2020 Document Reviewed: 11/07/2018  Elsevier Patient Education © 2020 [employer]

## 2021-02-02 NOTE — PROGRESS NOTES
Pt A/Ox4. Still complains of L sided pain, given Oxy x2. NPO and no narcotics after MN for procedure this morning. Within fluid restriction parameters, see I/Os.

## 2021-02-02 NOTE — CARE PLAN
Problem: Safety  Goal: Will remain free from injury  Outcome: PROGRESSING AS EXPECTED   All fall precautions implemented. Pt understands to use call light if any needs arise.    Problem: Pain Management  Goal: Pain level will decrease to patient's comfort goal  Outcome: PROGRESSING AS EXPECTED   Given Oxy x2 for pain management. No narcotics after MN, pt verbally understands.

## 2021-02-02 NOTE — PROGRESS NOTES
Pt drank Boost drink before MN, and has been NPO since, along with no narcotics in prep for HIDA scan this morning. Labs drawn around 5 am, still pending. Attempted to call [facility] to see if patient was possibly on schedule for this AM, no answer. Will try again at a later time.

## 2021-02-02 NOTE — CARE PLAN
Problem: Communication  Goal: The ability to communicate needs accurately and effectively will improve  Outcome: PROGRESSING AS EXPECTED     Problem: Safety  Goal: Will remain free from falls  Outcome: PROGRESSING AS EXPECTED   Pt concerned about pain management and time of discharge. Notified of upcoming NPO status and ongoing fluid restriction. Pt resting comfortably in bed.
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
## 2021-02-08 ENCOUNTER — PATIENT OUTREACH (OUTPATIENT)
Dept: HEALTH INFORMATION MANAGEMENT | Facility: OTHER | Age: 56
End: 2021-02-08

## 2021-02-08 SDOH — ECONOMIC STABILITY: INCOME INSECURITY: HOW HARD IS IT FOR YOU TO PAY FOR THE VERY BASICS LIKE FOOD, HOUSING, MEDICAL CARE, AND HEATING?: VERY HARD

## 2021-02-08 SDOH — ECONOMIC STABILITY: TRANSPORTATION INSECURITY
IN THE PAST 12 MONTHS, HAS LACK OF TRANSPORTATION KEPT YOU FROM MEETINGS, WORK, OR FROM GETTING THINGS NEEDED FOR DAILY LIVING?: YES

## 2021-02-08 NOTE — PROGRESS NOTES
Community Health Worker Intake  • Social determinates of health intake Complete   • Identified barriers to Housing and Utility Cost .  • Contact information provided to Zari Noguera Shay   • Has PCP appointment scheduled for  3/1  • Outpatient assessment completed.  • Did the patient receive medications post discharge: Yes    CHW called pt to complete outpatient assessment. Pt states she made a PCP appointment. Pt identifies same barriers to housing and utility cost, CHW informed pt that resources are in the mail and to charlette back if they don't arrive. Pt identifies no other barriers to resources. Pt expressed no needs at this time.    Plan: CHW will d/c pt fr

## 2021-03-02 NOTE — HOSPITAL COURSE
Zari Day is a 55 y.o. female with past medical history of longstanding alcohol abuse, alcoholic cirrhosis, alcoholic hepatitis, seizure presented 1/30/2021 with abdominal distention, pain mostly on the left side for last 6 weeks.  She decided to come in the hospital for further evaluation due to persistent advice from her family, .  Upon arrival ultrasound showed gallbladder wall thickening with no visual gallstones, hepatomegaly with nodular hepatic contour.  Paracentesis was performed and 1.5 L of ascitic fluid were removed.  Peritoneal fluid analysis negative for SBP.  Meld score 20, Child Fisher score 11.  She was started on spironolactone 50 mg, Lasix 20 mg.  HIDA scan was within normal limits.  Also a cardiac murmur were noted on physical examination by admitting physician and echocardiogram was grossly normal.  She was treated with prednisolone for alcohol induced hepatitis and counseled on cessation and follow up. LFTs trended downward and patient insisted on discharge.

## 2021-07-24 NOTE — PROGRESS NOTES
PRIMARY CARE CLINIC FOLLOW UP VISIT  Chief Complaint   Patient presents with   • Seizure   • Anxiety   • Other     Thrombocytopenia     History of Present Illness     Zari is here with her  today for the following:     Seizure cerebral  Resumed zonegran at 300 mg daily since she last saw me 11/21/2018 and hasn't had any seizure activity since she last saw me.     Thrombocytopenia (HCC)  Just recently saw hematology with plan to do surveillance on her blood tests.     Anxiety  She tried celexa and xanax but it made her so sedated that she chose to stop these medications and doesn't wish to continue.     Current Outpatient Prescriptions   Medication Sig Dispense Refill   • predniSONE (DELTASONE) 10 MG Tab Take 10 mg by mouth every day.     • ibuprofen (MOTRIN) 200 MG Tab Take 200 mg by mouth every 6 hours as needed.     • zonisamide (ZONEGRAN) 100 MG Cap Take 3 Caps by mouth every day. 270 Cap 1   • acyclovir (ZOVIRAX) 400 MG tablet Take 1 Tab by mouth 2 times a day. 60 Tab 1   • meloxicam (MOBIC) 7.5 MG Tab Take 1 Tab by mouth every day. 30 Tab 3   • hydroxychloroquine (PLAQUENIL) 200 MG Tab   2   • Cholecalciferol 50851 UNIT Cap Take 1 Cap by mouth every 7 days. 12 Cap 3   • folic acid (FOLVITE) 1 MG Tab Take 1 Tab by mouth every day. (Patient not taking: Reported on 10/29/2018) 90 Tab 3   • multivitamin (THERAGRAN) Tab Take 1 Tab by mouth every day. (Patient not taking: Reported on 10/29/2018) 30 Tab 0     No current facility-administered medications for this visit.      Past Medical History:   Diagnosis Date   • Alcoholic hepatitis    • Sebaceous cyst 9/12/2018   • Seizure cerebral 6/22/2018   • Thrombocytopenia (HCC)      No past surgical history on file.  Social History   Substance Use Topics   • Smoking status: Current Every Day Smoker     Packs/day: 1.00     Years: 5.00   • Smokeless tobacco: Never Used      Comment: smoked in her teens    • Alcohol use No     Social History     Social History Narrative  Pt here with uti symptoms, started last week getting worse "MYTRND      Family History   Problem Relation Age of Onset   • Hypertension Mother    • Alcohol/Drug Mother    • Breast Cancer Mother    • Hypertension Father    • Alcohol/Drug Father    • Autoimmune Disease Daughter         lupus   • Autoimmune Disease Son         colitis     Family Status   Relation Status   • Mo Alive   • Fa    • Gisela Alive   • Son Alive     Allergies: Patient has no known allergies.    ROS  As per HPI above. All other systems reviewed and negative.        Objective   Blood pressure 142/100, pulse 87, temperature 37.2 °C (99 °F), resp. rate 14, height 1.702 m (5' 7\"), weight 73.5 kg (162 lb), SpO2 97 %, not currently breastfeeding. Body mass index is 25.37 kg/m².    General: alert and oriented, pleasant, cooperative  HEENT: Normocephalic, atraumatic.   Psychiatric: tearful at times      Assessment and Plan   The following treatment plan was discussed     1. Seizure cerebral  Quiescent on zonergan 300 mg daily.     2. Thrombocytopenia (HCC)  Stable, now followed by hematology with repeat follow up 2019. She is also going to be seeing [place] 2019 to further evaluate her possible autoimmune disease which could certainly be playing a role in her thrombocytopenia.     3. Anxiety  Trial of celexa and xanax however this made her too sedated and she has self discontinued these medications.       Healthcare maintenance     Health Maintenance Due   Topic Date Due   • IMM HEP B VACCINE (1 of 3 - Risk 3-dose series) 1986   • IMM PNEUMOCOCCAL 19-64 (ADULT) MEDIUM RISK SERIES (1 of 1 - PPSV23) 1986   • COLONOSCOPY  2017   • IMM INFLUENZA (1) 2018       Return in about 3 months (around 3/19/2019).    Shorty Ibarra MD  Internal Medicine  [facility]                   "

## 2021-08-18 ENCOUNTER — HOSPITAL ENCOUNTER (OUTPATIENT)
Dept: LAB | Facility: MEDICAL CENTER | Age: 56
End: 2021-08-18
Attending: PHYSICIAN ASSISTANT
Payer: COMMERCIAL

## 2021-08-18 LAB
ALBUMIN SERPL BCP-MCNC: 4.3 G/DL (ref 3.2–4.9)
ALBUMIN/GLOB SERPL: 1.3 G/DL
ALP SERPL-CCNC: 132 U/L (ref 30–99)
ALT SERPL-CCNC: 17 U/L (ref 2–50)
ANION GAP SERPL CALC-SCNC: 15 MMOL/L (ref 7–16)
ANISOCYTOSIS BLD QL SMEAR: ABNORMAL
AST SERPL-CCNC: 28 U/L (ref 12–45)
BASOPHILS # BLD AUTO: 1.8 % (ref 0–1.8)
BASOPHILS # BLD: 0.09 K/UL (ref 0–0.12)
BILIRUB SERPL-MCNC: 0.6 MG/DL (ref 0.1–1.5)
BUN SERPL-MCNC: 29 MG/DL (ref 8–22)
CALCIUM SERPL-MCNC: 10.1 MG/DL (ref 8.5–10.5)
CHLORIDE SERPL-SCNC: 99 MMOL/L (ref 96–112)
CO2 SERPL-SCNC: 23 MMOL/L (ref 20–33)
COMMENT 1642: NORMAL
CREAT SERPL-MCNC: 1.12 MG/DL (ref 0.5–1.4)
EOSINOPHIL # BLD AUTO: 0.18 K/UL (ref 0–0.51)
EOSINOPHIL NFR BLD: 3.6 % (ref 0–6.9)
ERYTHROCYTE [DISTWIDTH] IN BLOOD BY AUTOMATED COUNT: 43.7 FL (ref 35.9–50)
GLOBULIN SER CALC-MCNC: 3.2 G/DL (ref 1.9–3.5)
GLUCOSE SERPL-MCNC: 105 MG/DL (ref 65–99)
HCT VFR BLD AUTO: 33.4 % (ref 37–47)
HGB BLD-MCNC: 9.6 G/DL (ref 12–16)
HYPOCHROMIA BLD QL SMEAR: ABNORMAL
IMM GRANULOCYTES # BLD AUTO: 0.02 K/UL (ref 0–0.11)
IMM GRANULOCYTES NFR BLD AUTO: 0.4 % (ref 0–0.9)
LYMPHOCYTES # BLD AUTO: 1.07 K/UL (ref 1–4.8)
LYMPHOCYTES NFR BLD: 21.6 % (ref 22–41)
MCH RBC QN AUTO: 21.7 PG (ref 27–33)
MCHC RBC AUTO-ENTMCNC: 28.7 G/DL (ref 33.6–35)
MCV RBC AUTO: 75.4 FL (ref 81.4–97.8)
MICROCYTES BLD QL SMEAR: ABNORMAL
MONOCYTES # BLD AUTO: 0.49 K/UL (ref 0–0.85)
MONOCYTES NFR BLD AUTO: 9.9 % (ref 0–13.4)
MORPHOLOGY BLD-IMP: NORMAL
NEUTROPHILS # BLD AUTO: 3.1 K/UL (ref 2–7.15)
NEUTROPHILS NFR BLD: 62.7 % (ref 44–72)
NRBC # BLD AUTO: 0 K/UL
NRBC BLD-RTO: 0 /100 WBC
OVALOCYTES BLD QL SMEAR: NORMAL
PLATELET # BLD AUTO: 159 K/UL (ref 164–446)
PLATELET BLD QL SMEAR: NORMAL
PMV BLD AUTO: 11.9 FL (ref 9–12.9)
POIKILOCYTOSIS BLD QL SMEAR: NORMAL
POLYCHROMASIA BLD QL SMEAR: NORMAL
POTASSIUM SERPL-SCNC: 4.2 MMOL/L (ref 3.6–5.5)
PROT SERPL-MCNC: 7.5 G/DL (ref 6–8.2)
RBC # BLD AUTO: 4.43 M/UL (ref 4.2–5.4)
RBC BLD AUTO: PRESENT
SODIUM SERPL-SCNC: 137 MMOL/L (ref 135–145)
WBC # BLD AUTO: 5 K/UL (ref 4.8–10.8)

## 2021-08-18 PROCEDURE — 85025 COMPLETE CBC W/AUTO DIFF WBC: CPT

## 2021-08-18 PROCEDURE — 80053 COMPREHEN METABOLIC PANEL: CPT

## 2021-08-18 PROCEDURE — 86039 ANTINUCLEAR ANTIBODIES (ANA): CPT

## 2021-08-18 PROCEDURE — 86038 ANTINUCLEAR ANTIBODIES: CPT

## 2021-08-18 PROCEDURE — 36415 COLL VENOUS BLD VENIPUNCTURE: CPT

## 2021-08-20 LAB — NUCLEAR IGG SER QL IA: DETECTED

## 2021-08-21 LAB
ANA INTERPRETIVE COMMENT Q5143: ABNORMAL
ANA PATTERN Q5144: ABNORMAL
ANA TITER Q5145: ABNORMAL
ANTINUCLEAR ANTIBODY (ANA), HEP-2, IGG Q5142: DETECTED

## 2023-03-07 NOTE — H&P
Appointment Cancellation or Reschedule     Person calling in Patient   If someone other than patient calling, are they listed on the communication consent form? N/A   Provider Dr Christine Baker   Office Visit Date and Time  03/15/2023 @1PM    Office Visit Location [facility]   Did patient want to reschedule their office appointment? If so, when was it scheduled to? YES, 03/16/2023 @1PM with Vanessadiana Edward    Did you have STAR scheduled for this appointment? No   Do you need STAR set up for your new appointment? If yes, please send to "PATIENT RIDESHARE" pool for STAR rescheduling No   Is this patient calling to reschedule an infusion appointment? No   When is their next infusion appointment? N/A   Is this patient a Chemo patient? No   Reason for Cancellation or Reschedule Provider requested a reschedule    Was No show policy reviewed with patient if patient canceling within 24 hours? Yes     If the patient is cancelling an appointment and needs their STAR Transport cancelled, please route to Jocelyne [address]  If the patient is a treatment patient, please route this to the office nurse  If the patient is not on treatment, please route to the Clerical pool based on location  If the patient is a surgical oncology patient, please route to surg/onc clinical pool  Route message as high priority if same day cancellation  PRIMARY CARE PROVIDER:  None.      CHIEF COMPLAINT:  Seizures.      HISTORY OF PRESENT ILLNESS:  The patient is a 51-year-old female who was noted   by her  to have seizure-like activity and was unresponsive.  Her   seizure lasted for about 10 minutes.  She bit her tongue during the episode.    She did not have any urine or stool incontinence.  The patient has no clear   recollection of the events and her  is not at her bedside, so most of   the information is obtained from discussion with emergency room physician and   nursing staff.  The patient was tired after the event and slowly improved back   to her baseline.  She has a history of alcoholism and drinks about a pint of   hard liquor; however, lately she has been trying to quit and her last drink   was about 3 days ago.  She denies any previous history of seizures; however,   she reports that she has a previous history of traumatic head injury.  She   denies any chest pain.  She denies any shortness of breath.  She had nausea,   vomiting and diarrhea yesterday.  She denies any abdominal pain.      REVIEW OF SYSTEMS:  As above, otherwise reviewed and negative.      PAST MEDICAL HISTORY:  Traumatic brain injury, otherwise negative per patient.        PAST SURGICAL HISTORY:  Negative.      SOCIAL HISTORY:  She does not smoke.  She denies illicit drug use.  She has a   longstanding history of alcoholism for about 21 years.      FAMILY HISTORY:  Positive for hypertension.      CURRENT MEDICATIONS:  Ibuprofen as needed.      ALLERGIES:  No known drug allergies.      PHYSICAL EXAMINATION:    GENERAL:  She is alert, oriented x3.    VITAL SIGNS:  Temperature is 37.1, pulse is 115, respiratory rate is 16, blood   pressure is 152/100 and pulse oximetry is 98%.    HEAD AND NECK:  Pupils equal.  Supple neck.  No jugular venous distention.    Oropharynx, she has tongue ecchymosis at the right side and some mild edema.    Uvula is in the midline.  No stridor.     HEART:  Regular rate and rhythm, normal S1, S2.  No murmurs, rubs or gallops.      LUNGS:  Clear with symmetric air entry bilaterally.  No chest wall tenderness.      ABDOMEN:  Soft, nontender.  Bowel sounds are positive.  No hepatosplenomegaly.      EXTREMITIES:  No edema, no clubbing, no cyanosis.    NEUROLOGIC:  No focal deficits.    SKIN:  No rash.      DIAGNOSTICS:  White blood cell count is 1.1, hemoglobin 13.1, hematocrit 37.1   and platelet count 46,000.  Sodium is 138, potassium is 3.4, chloride 102,   bicarbonate 23, glucose 165, BUN 12 and creatinine 0.69.  , ALT 64,   alkaline phosphatase 157, total bilirubin is 4.1.      ASSESSMENT AND PLAN:    1.  Seizure, likely secondary to alcohol withdrawal.    2.  Pancytopenia, likely related to her alcoholism.     3.  Hypokalemia.     4.  Elevated liver function tests likely secondary to alcoholic hepatitis.       PLAN:  The patient will be admitted for close clinical monitoring with serial   neurologic exams.  We will institute seizure precautions.  We will start her   on Librium and order p.r.n. lorazepam.      We will start her on thiamine and folic acid and order lorazepam per [facility]   protocol.  We will start her on IV fluids for hydration and replete her   electrolytes.      We will monitor her CBC and LFTs.      Given her history of traumatic brain injury, we will check an MRI and EEG.      Patient was counseled on the importance of alcohol cessation.  We will ask    to provide her with resources to help her quit.      We will check liver ultrasound and hepatitis panel.      We will start her on heparin for deep venous thrombosis prophylaxis.      Plan of care reviewed with the patient and questions answered.       ____________________________________     MD VERONICA NICHOLE / [place]    DD:  06/21/2017 14:38:12  DT:  06/21/2017 16:19:44    D#:  [id number]  Job#:  [id number]

## 2023-10-26 NOTE — ED NOTES
1) RN received refill request from [facility]/PHARMACY #[id number] - [place], [place] - [address] for hydrOXYzine (ATARAX) 25 MG tablet   Sig - Route: Take 1-2 tablets (25-50 mg) by mouth 3 times daily as needed for anxiety - Oral    2) This medication was last prescribed on 10/17/2023 for qty of 90 with 0 refills. Pt should not need a new prescription until around 11/16/2023.    3) Therefore, RN sent reply back to pharmacy that refill request will be DENIED.  d/t to early to refill.     Jolly Delgado RN on 10/26/2023 at 1:59 PM     ERP at bedside.

## 2025-04-18 NOTE — PROGRESS NOTES
12 hour cc   Render Risk Assessment In Note?: no Additional Notes: Recurring Bx proven mild DN Detail Level: Zone

## (undated) DEVICE — SYRINGE 3 CC 22 GA X 1-1/2 - NDL SAFETY (50/BX 8BX/CA)

## (undated) DEVICE — SYRINGE 6 CC 20 GA X 1 1/2 - NDL SAFETY  (50/BX)

## (undated) DEVICE — CON SEDATION/>5 YR 1ST 15 MIN

## (undated) DEVICE — CANNULA W/ SUPPLY TUBING O2 - (50/CA)

## (undated) DEVICE — ELECTRODE 850 FOAM ADHESIVE - HYDROGEL RADIOTRNSPRNT (50/PK)

## (undated) DEVICE — SOD. CHL 10CC SYRINGE PREFILL - W/10 CC (30/BX)